# Patient Record
Sex: MALE | Race: ASIAN | Employment: UNEMPLOYED | URBAN - METROPOLITAN AREA
[De-identification: names, ages, dates, MRNs, and addresses within clinical notes are randomized per-mention and may not be internally consistent; named-entity substitution may affect disease eponyms.]

---

## 2017-11-17 ENCOUNTER — OFFICE VISIT (OUTPATIENT)
Dept: INTERNAL MEDICINE CLINIC | Facility: CLINIC | Age: 57
End: 2017-11-17

## 2017-11-17 VITALS
HEIGHT: 65 IN | TEMPERATURE: 97.3 F | DIASTOLIC BLOOD PRESSURE: 84 MMHG | WEIGHT: 175 LBS | BODY MASS INDEX: 29.16 KG/M2 | HEART RATE: 68 BPM | RESPIRATION RATE: 18 BRPM | SYSTOLIC BLOOD PRESSURE: 142 MMHG

## 2017-11-17 DIAGNOSIS — Z12.11 COLON CANCER SCREENING: ICD-10-CM

## 2017-11-17 DIAGNOSIS — G47.30 SLEEP APNEA, UNSPECIFIED TYPE: ICD-10-CM

## 2017-11-17 DIAGNOSIS — I10 ESSENTIAL HYPERTENSION: Primary | ICD-10-CM

## 2017-11-17 DIAGNOSIS — Z23 ENCOUNTER FOR IMMUNIZATION: ICD-10-CM

## 2017-11-17 DIAGNOSIS — Z90.5 HISTORY OF NEPHRECTOMY: ICD-10-CM

## 2017-11-17 DIAGNOSIS — E78.5 HYPERLIPIDEMIA LDL GOAL <100: ICD-10-CM

## 2017-11-17 DIAGNOSIS — Z86.73 HISTORY OF STROKE: ICD-10-CM

## 2017-11-17 RX ORDER — ATORVASTATIN CALCIUM 80 MG/1
80 TABLET, FILM COATED ORAL DAILY
COMMUNITY
End: 2018-12-27

## 2017-11-17 RX ORDER — CLONIDINE HYDROCHLORIDE 0.3 MG/1
0.3 TABLET ORAL 2 TIMES DAILY
COMMUNITY
End: 2018-11-29 | Stop reason: SDUPTHER

## 2017-11-17 RX ORDER — HYDRALAZINE HYDROCHLORIDE 25 MG/1
25 TABLET, FILM COATED ORAL 3 TIMES DAILY
COMMUNITY
End: 2018-06-27 | Stop reason: SDUPTHER

## 2017-11-17 RX ORDER — GUAIFENESIN 100 MG/5ML
81 LIQUID (ML) ORAL DAILY
COMMUNITY
End: 2022-10-24 | Stop reason: SDUPTHER

## 2017-11-17 RX ORDER — METOPROLOL SUCCINATE 100 MG/1
50 TABLET, EXTENDED RELEASE ORAL DAILY
COMMUNITY
End: 2019-04-04 | Stop reason: SDUPTHER

## 2017-11-17 RX ORDER — CHOLECALCIFEROL TAB 125 MCG (5000 UNIT) 125 MCG
TAB ORAL DAILY
COMMUNITY
End: 2022-10-04

## 2017-11-17 RX ORDER — HYDROCORTISONE ACETATE 1 %
CREAM (GRAM) TOPICAL DAILY
COMMUNITY
End: 2022-10-04

## 2017-11-17 NOTE — PATIENT INSTRUCTIONS
Learning About Low-Fat Eating  What is low-fat eating? Most food has some fat in it. Your body needs some fat to be healthy. But some kinds of fats are healthier than others. In a low-fat eating plan, you try to choose healthier fats and eat fewer unhealthy fats. Healthy fats include olive and canola oil. Try to avoid eating too much saturated fat (such as in cheese and meats) and trans fat (a type of fat found in many packaged snack foods and other baked goods). You do not need to cut all fat from your diet. But you can make healthier choices about the types and amount of fat you eat. Even though it is a good idea to choose healthier fats, it is still important to be careful of how much fat you eat, because all fats are high in calories. What are the different types of fats? Unhealthy fats  · Saturated fat. Saturated fats are mostly in animal foods, such as meat and dairy foods. Tropical oils, such as coconut oil, palm oil, and cocoa butter, are also saturated fats. · Trans fat. Trans fats include shortening, partially hydrogenated vegetable oils, and hydrogenated vegetable oils. Trans fats are made when a liquid fat is turned into a solid fat (for example, when corn oil is made into stick margarine). They are in many processed foods, such as cookies, crackers, and snack foods. Healthy fats  · Monounsaturated fat. Monounsaturated fats are liquid at room temperature but get solid when refrigerated. Eating foods that are high in this fat may help lower your \"bad\" (LDL) cholesterol, keep your \"good\" (HDL) cholesterol level up, and lower your chances of getting heart disease. This fat is found in canola oil, olive oil, peanut oil, olives, avocados, nuts, and nut butters. · Polyunsaturated fat. Polyunsaturated fats are liquid at room temperature. They are in safflower, sunflower, and corn oils. They are also the main fat in seafood. Omega-3 fatty acids are types of polyunsaturated fat.  Omega-3 fatty acids may lower your chances of getting heart disease. Fatty fish such as salmon and mackerel contain these healthy fatty acids. So do ground flaxseeds and flaxseed oil, soybeans, walnuts, and seeds. Why cut down on unhealthy fats? Eating foods that contain saturated fats can raise the LDL (\"bad\") cholesterol in your blood. Having a high level of LDL cholesterol increases your chance of hardening of the arteries (atherosclerosis), which can lead to heart disease, heart attack, and stroke. Trans fat raises the level of \"bad\" LDL cholesterol in your blood and may lower the \"good\" HDL cholesterol in your blood. Trans fat can raise your risk of heart disease, heart attack, and stroke. In general:  · No more than 10% of your daily calories should come from saturated fat. This is about 20 grams in a 2,000-calorie diet. · No more than 10% of your daily calories should come from polyunsaturated fat. This is about 20 grams in a 2,000-calorie diet. · Monounsaturated fats can be up to 15% of your daily calories. This is about 25 to 30 grams in a 2,000-calorie diet. If you're not sure how much fat you should be eating or how many calories you need each day to stay at a healthy weight, talk to a registered dietitian. He or she can help you create a plan that's right for you. What can you do to cut down on fat? Foods like cheese, butter, sausage, and desserts can have a lot of unhealthy fats. Try these tips for healthier meals at home and when you eat out. At home  · Fill up on fruits, vegetables, and whole grains. · Think of meat as a side dish instead of as the main part of your meal.  · When you do eat meat, make it extra-lean ground beef (97% lean), ground turkey breast (without skin added), meats with fat trimmed off before cooking, or skinless chicken. · Try main dishes that use whole wheat pasta, brown rice, dried beans, or vegetables.   · Use cooking methods that use little or no fat, such as broiling, steaming, or grilling. Use cooking spray instead of oil. If you use oil, use a monounsaturated oil, such as canola or olive oil. · Read food labels on canned, bottled, or packaged foods. Choose those with little saturated fat and no trans fat. When eating out at a restaurant  · Order foods that are broiled or poached instead of fried or breaded. · Cut back on the amount of butter or margarine that you use on bread. Use small amounts of olive oil instead. · Order sauces, gravies, and salad dressings on the side, and use only a little. · When you order pasta, choose tomato sauce instead of cream sauce. · Ask for salsa with your baked potato instead of sour cream, butter, cheese, or de guzman. Where can you learn more? Go to http://vitaly-isaac.info/. Enter Y362 in the search box to learn more about \"Learning About Low-Fat Eating. \"  Current as of: May 12, 2017  Content Version: 11.4  © 3612-7448 Healthwise, Incorporated. Care instructions adapted under license by [employer] (which disclaims liability or warranty for this information). If you have questions about a medical condition or this instruction, always ask your healthcare professional. [address] any warranty or liability for your use of this information.

## 2017-11-17 NOTE — PROGRESS NOTES
Subjective:      Petey Alarcon is a 61 y.o. male who is a new patient and is here to establish care. Previous followed by PCP in July 2017. The following sections were reviewed & updated as appropriate: PMH, PL, PSH, FH, RxH, and SH. His primary language Lo[place]. History of nephrectomy, stroke in 2007, he also has hypertension and hyperlipidemia. He is being followed by nephrology. He will see an opthalmology tomorrow, he complains of running eyes. Reviewed previous records and scanned pertinent files. Cardiovascular Review  The patient has hypertension, hyperlipidemia and stroke in 2007. He reports taking medications as instructed, no medication side effects noted, no chest pain on exertion, no dyspnea on exertion, no swelling of ankles, no orthostatic dizziness or lightheadedness, no palpitations. Diet and Lifestyle: generally follows a low fat low cholesterol diet, generally follows a low sodium diet, exercises regularly. Labs: reviewed and discussed with patient. Lsat lipid check was in 2016 per wife's files. Will request additional records from his previous PCP, repeat levels needed as well as due date for physical. He states his BP is typically 120s/70s. He states he runs on a treadmill, he has residual right sided weakness in both upper and lower extremities. Nephrectomy: done in 1990 to right. He states he will see nephrology 11/28/17, his creatinine has been steadily increasing. Patient Active Problem List    Diagnosis Date Noted    Essential hypertension 11/17/2017    History of nephrectomy 11/17/2017    Hyperlipidemia LDL goal <100 11/17/2017    History of stroke 11/17/2017    Sleep apnea 11/17/2017     Current Outpatient Prescriptions   Medication Sig Dispense Refill    vitamin e (AQUA GEMS) 200 unit capsule Take  by mouth daily.  cholecalciferol, VITAMIN D3, (VITAMIN D3) 5,000 unit tab tablet Take  by mouth daily.       metoprolol succinate (TOPROL XL) 100 mg tablet Take by mouth daily.  cloNIDine HCl (CATAPRES) 0.3 mg tablet Take 0.3 mg by mouth two (2) times a day.  hydrALAZINE (APRESOLINE) 25 mg tablet Take 25 mg by mouth three (3) times daily.  atorvastatin (LIPITOR) 80 mg tablet Take 80 mg by mouth daily.  aspirin 81 mg chewable tablet Take 81 mg by mouth daily. No Known Allergies  Past Medical History:   Diagnosis Date    Hypercholesterolemia     Hypertension     Stroke ([facility] [address]      Family History   Problem Relation Age of Onset    Heart Disease Mother     No Known Problems Father     Stroke Brother      Social History   Substance Use Topics    Smoking status: Former Smoker    Smokeless tobacco: Never Used    Alcohol use No        Review of Systems    A comprehensive review of systems was negative. Objective:     Visit Vitals    /84    Pulse 68    Temp 97.3 °F (36.3 °C) (Oral)    Resp 18    Ht 5' 5\" (1.651 m)    Wt 175 lb (79.4 kg)    BMI 29.12 kg/m2     General appearance: alert, cooperative, no distress, appears stated age  Head: Normocephalic, without obvious abnormality, atraumatic  Eyes: negative  Back: symmetric, no curvature. ROM normal. No CVA tenderness. Lungs: clear to auscultation bilaterally  Heart: regular rate and rhythm, S1, S2 normal, no murmur, click, rub or gallop  Extremities: extremities normal, atraumatic, no cyanosis or edema  Pulses: 2+ and symmetric  Neurologic: Alert and oriented X 3, normal strength and tone. Normal symmetric reflexes. Normal coordination and gait, ambulates with cane  Nursing note and vitals reviewed  Assessment/Plan:       ICD-10-CM ICD-9-CM    1. Essential hypertension I10 401.9    2. History of nephrectomy Z90.5 V45.73    3. Hyperlipidemia LDL goal <100 E78.5 272.4    4. History of stroke Z86.73 V12.54    5. Colon cancer screening Z12.11 V76.51 REFERRAL TO GASTROENTEROLOGY   6. Encounter for immunization Z23 V03.89 INFLUENZA VIRUS VAC QUAD,SPLIT,PRESV FREE SYRINGE IM   7.  Sleep apnea, unspecified type G47.30 780.57 REFERRAL TO SLEEP STUDIES     Follow-up Disposition:  Return for Schedule your annual physical with fasting labs, Please sign record release forms at . Advised him to call back or return to office if symptoms worsen/change/persist.  Discussed expected course/resolution/complications of diagnosis in detail with patient. Medication risks/benefits/costs/interactions/alternatives discussed with patient. He was given an after visit summary which includes diagnoses, current medications, & vitals. He expressed understanding with the diagnosis and plan.

## 2017-11-17 NOTE — MR AVS SNAPSHOT
Visit Information Date & Time Provider Department Dept. Phone Encounter #  
 11/17/2017  8:30 AM Vilma Baum NP [facility] [phone] [id number] Follow-up Instructions Return for Schedule your annual physical with fasting labs. Upcoming Health Maintenance Date Due Hepatitis C Screening 1960 DTaP/Tdap/Td series (1 - Tdap) 8/9/1980 FOBT Q 1 YEAR AGE 50-75 8/9/2009 Influenza Age 5 to Adult 8/1/2017 Allergies as of 11/17/2017  Review Complete On: 11/17/2017 By: Vilma Baum NP No Known Allergies Current Immunizations  Never Reviewed Name Date Influenza Vaccine (Quad) PF  Incomplete Not reviewed this visit You Were Diagnosed With   
  
 Codes Comments Essential hypertension    -  Primary ICD-10-CM: I10 
ICD-9-CM: 401.9 History of nephrectomy     ICD-10-CM: Z90.5 ICD-9-CM: V45.73 Hyperlipidemia LDL goal <100     ICD-10-CM: E78.5 ICD-9-CM: 272.4 History of stroke     ICD-10-CM: Z86.73 
ICD-9-CM: V12.54 Colon cancer screening     ICD-10-CM: Z12.11 ICD-9-CM: V76.51 Encounter for immunization     ICD-10-CM: S15 ICD-9-CM: V03.89 Sleep apnea, unspecified type     ICD-10-CM: G47.30 ICD-9-CM: 780.57 Vitals BP Pulse Temp Resp Height(growth percentile) Weight(growth percentile) (!) 150/91 68 97.3 °F (36.3 °C) (Oral) 18 5' 5\" (1.651 m) 175 lb (79.4 kg) BMI Smoking Status 29.12 kg/m2 Former Smoker Vitals History BMI and BSA Data Body Mass Index Body Surface Area  
 29.12 kg/m 2 1.91 m 2 Preferred Pharmacy Pharmacy Name Phone [facility] [address], [address] [place] [address] [phone] Your Updated Medication List  
  
   
This list is accurate as of: 11/17/17  9:19 AM.  Always use your most recent med list.  
  
  
  
  
 aspirin 81 mg chewable tablet Take 81 mg by mouth daily. atorvastatin 80 mg tablet Commonly known as:  LIPITOR Take 80 mg by mouth daily. cholecalciferol (VITAMIN D3) 5,000 unit Tab tablet Commonly known as:  VITAMIN D3 Take  by mouth daily. cloNIDine HCl 0.3 mg tablet Commonly known as:  CATAPRES Take 0.3 mg by mouth two (2) times a day. hydrALAZINE 25 mg tablet Commonly known as:  APRESOLINE Take 25 mg by mouth three (3) times daily. TOPROL  mg tablet Generic drug:  metoprolol succinate Take  by mouth daily. vitamin e 200 unit capsule Commonly known as:  [address] Take  by mouth daily. We Performed the Following INFLUENZA VIRUS VAC QUAD,SPLIT,PRESV FREE SYRINGE IM [id number] CPT(R)] REFERRAL TO GASTROENTEROLOGY [VTG06 Custom] REFERRAL TO SLEEP STUDIES [REF99 Custom] Follow-up Instructions Return for Schedule your annual physical with fasting labs. Referral Information Referral ID Referred By Referred To  
  
 [id number] SKIFF, [address] [address] [address] [place] [address] Visits Status Start Date End Date 1 New Request 11/17/17 11/17/18 If your referral has a status of pending review or denied, additional information will be sent to support the outcome of this decision. Referral ID Referred By Referred To  
 [id number] SKIFF, Shellye Crass, [address] Phone: [phone] Visits Status Start Date End Date 1 New Request 11/17/17 11/17/18 If your referral has a status of pending review or denied, additional information will be sent to support the outcome of this decision. Patient Instructions Learning About Low-Fat Eating What is low-fat eating? Most food has some fat in it. Your body needs some fat to be healthy. But some kinds of fats are healthier than others. In a low-fat eating plan, you try to choose healthier fats and eat fewer unhealthy fats. Healthy fats include olive and canola oil. Try to avoid eating too much saturated fat (such as in cheese and meats) and trans fat (a type of fat found in many packaged snack foods and other baked goods). You do not need to cut all fat from your diet. But you can make healthier choices about the types and amount of fat you eat. Even though it is a good idea to choose healthier fats, it is still important to be careful of how much fat you eat, because all fats are high in calories. What are the different types of fats? Unhealthy fats · Saturated fat. Saturated fats are mostly in animal foods, such as meat and dairy foods. Tropical oils, such as coconut oil, palm oil, and cocoa butter, are also saturated fats. · Trans fat. Trans fats include shortening, partially hydrogenated vegetable oils, and hydrogenated vegetable oils. Trans fats are made when a liquid fat is turned into a solid fat (for example, when corn oil is made into stick margarine). They are in many processed foods, such as cookies, crackers, and snack foods. Healthy fats · Monounsaturated fat. Monounsaturated fats are liquid at room temperature but get solid when refrigerated. Eating foods that are high in this fat may help lower your \"bad\" (LDL) cholesterol, keep your \"good\" (HDL) cholesterol level up, and lower your chances of getting heart disease. This fat is found in canola oil, olive oil, peanut oil, olives, avocados, nuts, and nut butters. · Polyunsaturated fat. Polyunsaturated fats are liquid at room temperature. They are in safflower, sunflower, and corn oils. They are also the main fat in seafood. Omega-3 fatty acids are types of polyunsaturated fat. Omega-3 fatty acids may lower your chances of getting heart disease. Fatty fish such as salmon and mackerel contain these healthy fatty acids.  So do ground flaxseeds and flaxseed oil, soybeans, walnuts, and seeds. Why cut down on unhealthy fats? Eating foods that contain saturated fats can raise the LDL (\"bad\") cholesterol in your blood. Having a high level of LDL cholesterol increases your chance of hardening of the arteries (atherosclerosis), which can lead to heart disease, heart attack, and stroke. Trans fat raises the level of \"bad\" LDL cholesterol in your blood and may lower the \"good\" HDL cholesterol in your blood. Trans fat can raise your risk of heart disease, heart attack, and stroke. In general: · No more than 10% of your daily calories should come from saturated fat. This is about 20 grams in a 2,000-calorie diet. · No more than 10% of your daily calories should come from polyunsaturated fat. This is about 20 grams in a 2,000-calorie diet. · Monounsaturated fats can be up to 15% of your daily calories. This is about 25 to 30 grams in a 2,000-calorie diet. If you're not sure how much fat you should be eating or how many calories you need each day to stay at a healthy weight, talk to a registered dietitian. He or she can help you create a plan that's right for you. What can you do to cut down on fat? Foods like cheese, butter, sausage, and desserts can have a lot of unhealthy fats. Try these tips for healthier meals at home and when you eat out. At home · Fill up on fruits, vegetables, and whole grains. · Think of meat as a side dish instead of as the main part of your meal. 
· When you do eat meat, make it extra-lean ground beef (97% lean), ground turkey breast (without skin added), meats with fat trimmed off before cooking, or skinless chicken. · Try main dishes that use whole wheat pasta, brown rice, dried beans, or vegetables. · Use cooking methods that use little or no fat, such as broiling, steaming, or grilling. Use cooking spray instead of oil. If you use oil, use a monounsaturated oil, such as canola or olive oil. · Read food labels on canned, bottled, or packaged foods. Choose those with little saturated fat and no trans fat. When eating out at a restaurant · Order foods that are broiled or poached instead of fried or breaded. · Cut back on the amount of butter or margarine that you use on bread. Use small amounts of olive oil instead. · Order sauces, gravies, and salad dressings on the side, and use only a little. · When you order pasta, choose tomato sauce instead of cream sauce. · Ask for salsa with your baked potato instead of sour cream, butter, cheese, or de guzman. Where can you learn more? Go to http://vitalyFleet Entertainment Groupisaac.info/. Enter Y738 in the search box to learn more about \"Learning About Low-Fat Eating. \" Current as of: May 12, 2017 Content Version: 11.4 © 7897-7806 [employer]. Care instructions adapted under license by [employer] (which disclaims liability or warranty for this information). If you have questions about a medical condition or this instruction, always ask your healthcare professional. [address] [address] any warranty or liability for your use of this information. Introducing [facility]! [employer] introduces Suite101 patient portal. Now you can access parts of your medical record, email your doctor's office, and request medication refills online. 1. In your internet browser, go to [email or web address] Textbook Time 2. Click on the First Time User? Click Here link in the Sign In box. You will see the New Member Sign Up page. 3. Enter your Suite101 Access Code exactly as it appears below. You will not need to use this code after youve completed the sign-up process. If you do not sign up before the expiration date, you must request a new code. · Suite101 Access Code: [email or web address] Expires: 2/15/2018  9:18 AM 
 
4.  Enter the last four digits of your Social Security Number (xxxx) and Date of Birth (mm/dd/yyyy) as indicated and click Submit. You will be taken to the next sign-up page. 5. Create a Telunjuk ID. This will be your [employer]uk login ID and cannot be changed, so think of one that is secure and easy to remember. 6. Create a Tel[employer]uk password. You can change your password at any time. 7. Enter your Password Reset Question and Answer. This can be used at a later time if you forget your password. 8. Enter your e-mail address. You will receive e-mail notification when new information is available in [address]. 9. Click Sign Up. You can now view and download portions of your medical record. 10. Click the Download Summary menu link to download a portable copy of your medical information. If you have questions, please visit the Frequently Asked Questions section of the Telunjuk website. Remember, Telunjuk is NOT to be used for urgent needs. For medical emergencies, dial 911. Now available from your iPhone and Android! Please provide this summary of care documentation to your next provider. If you have any questions after today's visit, please call [phone].

## 2017-11-17 NOTE — PROGRESS NOTES
Chief Complaint   Patient presents with   [facility]     1. Have you been to the ER, urgent care clinic since your last visit? Hospitalized since your last visit? No    2. Have you seen or consulted any other health care providers outside of the [address] [place] [address] since your last visit? Include any pap smears or colon screening. No    Dayna Reddy  is a 61 y.o.  male  who present for influenza immunizations/injections. He/she denies any symptoms , reactions or allergies that would exclude them from being immunized today. Risks and adverse reactions were discussed and the VIS was given if applicable to them. All questions were addressed. He/She was observed for 5 min post injection. There were no reactions observed.     Magaly Andrade LPN

## 2018-01-03 ENCOUNTER — DOCUMENTATION ONLY (OUTPATIENT)
Dept: INTERNAL MEDICINE CLINIC | Facility: CLINIC | Age: 58
End: 2018-01-03

## 2018-01-03 DIAGNOSIS — Z86.73 HISTORY OF STROKE: ICD-10-CM

## 2018-01-03 PROBLEM — N18.4 CKD (CHRONIC KIDNEY DISEASE) STAGE 4, GFR 15-29 ML/MIN (HCC): Status: ACTIVE | Noted: 2018-01-03

## 2018-01-03 NOTE — PROGRESS NOTES
Received previous medical records from [address] [place] [address] [address]. Med req done, amlodipine 10mg, clopidogrel 75mg, flonase, claritin, and proair are on his records but were not mentioned when he was last seen. Problem list updated. Td done in 2006. Stroke history in 2007.  History of positive PPD test

## 2018-03-21 PROBLEM — E66.3 OVERWEIGHT (BMI 25.0-29.9): Status: ACTIVE | Noted: 2018-03-21

## 2018-04-24 ENCOUNTER — TELEPHONE (OUTPATIENT)
Dept: INTERNAL MEDICINE CLINIC | Facility: CLINIC | Age: 58
End: 2018-04-24

## 2018-04-24 ENCOUNTER — OFFICE VISIT (OUTPATIENT)
Dept: INTERNAL MEDICINE CLINIC | Facility: CLINIC | Age: 58
End: 2018-04-24

## 2018-04-24 VITALS
TEMPERATURE: 97.8 F | HEIGHT: 65 IN | DIASTOLIC BLOOD PRESSURE: 95 MMHG | SYSTOLIC BLOOD PRESSURE: 157 MMHG | HEART RATE: 90 BPM | RESPIRATION RATE: 18 BRPM | WEIGHT: 176 LBS | BODY MASS INDEX: 29.32 KG/M2

## 2018-04-24 DIAGNOSIS — M1A.9XX1 GOUTY TOPHI OF JOINT: Primary | ICD-10-CM

## 2018-04-24 DIAGNOSIS — I10 ESSENTIAL HYPERTENSION: ICD-10-CM

## 2018-04-24 DIAGNOSIS — N18.4 CKD (CHRONIC KIDNEY DISEASE) STAGE 4, GFR 15-29 ML/MIN (HCC): ICD-10-CM

## 2018-04-24 DIAGNOSIS — M79.89 SWELLING OF TOE OF RIGHT FOOT: ICD-10-CM

## 2018-04-24 RX ORDER — PREDNISONE 10 MG/1
10 TABLET ORAL SEE ADMIN INSTRUCTIONS
Qty: 1 PACKAGE | Refills: 0 | Status: SHIPPED | OUTPATIENT
Start: 2018-04-24 | End: 2018-09-26 | Stop reason: ALTCHOICE

## 2018-04-24 NOTE — PROGRESS NOTES
Chief Complaint   Patient presents with    Joint Swelling     Right foot , left shoulder     1. Have you been to the ER, urgent care clinic since your last visit? Hospitalized since your last visit? No    2. Have you seen or consulted any other health care providers outside of the [address] [place] [address] since your last visit? Include any pap smears or colon screening.  No

## 2018-04-24 NOTE — PROGRESS NOTES
Subjective:      Mian Matthews is a 61 y.o. male who presents today for joint complaint. He will be seeing nephrologist in 2 days. Joint swelling: he notes it to right foot and left shoulder, symptoms started 3 months ago to left shoulder that resolved, right ankle with swelling for 3 days. The shoulder was painful and swollen after a fall. He states he had gout in the past bu this feels different. He denies any fall, trauma. Pain started in right toe. He is taking tylenol for pain for 8/10 foot pain. Cardiovascular Review  The patient has hypertension, history of CKD. Since last visit: pressure elevated today in office. He reports taking medications as instructed, no medication side effects noted, no TIA's, no chest pain on exertion, no dyspnea on exertion, no palpitations. Diet and Lifestyle: generally follows a low fat low cholesterol diet, generally follows a low sodium diet, exercises regularly. Labs: no lab studies available for review at time of visit. Patient Active Problem List    Diagnosis Date Noted    Overweight (BMI 25.0-29.9) 03/21/2018    CKD (chronic kidney disease) stage 4, GFR 15-29 ml/min ([facility] [address] 01/03/2018    Essential hypertension 11/17/2017    History of nephrectomy 11/17/2017    Hyperlipidemia LDL goal <100 11/17/2017    History of stroke 11/17/2017    Sleep apnea 11/17/2017     Current Outpatient Prescriptions   Medication Sig Dispense Refill    predniSONE (STERAPRED DS) 10 mg dose pack Take 1 Tab by mouth See Admin Instructions. Indications: Contact Dermatitis 1 Package 0    vitamin e (AQUA GEMS) 200 unit capsule Take  by mouth daily.  cholecalciferol, VITAMIN D3, (VITAMIN D3) 5,000 unit tab tablet Take  by mouth daily.  metoprolol succinate (TOPROL XL) 100 mg tablet Take  by mouth daily.  cloNIDine HCl (CATAPRES) 0.3 mg tablet Take 0.3 mg by mouth two (2) times a day.       hydrALAZINE (APRESOLINE) 25 mg tablet Take 25 mg by mouth three (3) times daily.  atorvastatin (LIPITOR) 80 mg tablet Take 80 mg by mouth daily.  aspirin 81 mg chewable tablet Take 81 mg by mouth daily. No Known Allergies  Past Medical History:   Diagnosis Date    Hypercholesterolemia     Hypertension     Stroke ([facility] [address]     TIA, stroke 2007        Review of Systems    A comprehensive review of systems was negative except for that written in the HPI. Objective:     Visit Vitals    BP (!) 157/95    Pulse 90    Temp 97.8 °F (36.6 °C)    Resp 18    Ht 5' 5\" (1.651 m)    Wt 176 lb (79.8 kg)    BMI 29.29 kg/m2     General appearance: alert, cooperative, no distress, appears stated age  Head: Normocephalic, without obvious abnormality, atraumatic  Eyes: negative  Back: symmetric, no curvature. ROM normal. No CVA tenderness. Lungs: clear to auscultation bilaterally  Heart: regular rate and rhythm, S1, S2 normal, no murmur, click, rub or gallop  Extremities: edema noted to right foot, right MTP joint to 1st digit with erythema, painful to palpation. Patient with slight erythema to left wrist that is not painful to palpation  Pulses: 2+ and symmetric  Skin: Skin color, texture, turgor normal. No rashes or lesions  Neurologic: Alert and oriented X 3, normal strength and tone. Normal symmetric reflexes. Normal coordination and gait  Nursing note and vitals reviewed  Assessment/Plan:       ICD-10-CM ICD-9-CM    1. Gouty tophi of joint M1A.00X1 274.03 URIC ACID      CRP, HIGH SENSITIVITY      predniSONE (STERAPRED DS) 10 mg dose pack   2. Swelling of toe of right foot M79.89 729.81 CBC WITH AUTOMATED DIFF      CRP, HIGH SENSITIVITY      predniSONE (STERAPRED DS) 10 mg dose pack   3. CKD (chronic kidney disease) stage 4, GFR 15-29 ml/min ([facility]) N18.4 585.4 REFERRAL TO NEPHROLOGY   4. Essential hypertension I10 401.9    He will see nephrology in 2 days, he will call with his BP from that visit.  Pressures uncontrolled today  Follow-up Disposition:  Return in about 4 weeks (around 5/22/2018) for Hypertension. Advised him to call back or return to office if symptoms worsen/change/persist.  Discussed expected course/resolution/complications of diagnosis in detail with patient. Medication risks/benefits/costs/interactions/alternatives discussed with patient. He was given an after visit summary which includes diagnoses, current medications, & vitals. He expressed understanding with the diagnosis and plan. Discussed the patient's BMI with him. The BMI follow up plan is as follows:     dietary management education, guidance, and counseling  encourage exercise  monitor weight  prescribed dietary intake    An After Visit Summary was printed and given to the patient.

## 2018-04-24 NOTE — PATIENT INSTRUCTIONS
Gout: Care Instructions  Your Care Instructions    Gout is a form of arthritis caused by a buildup of uric acid crystals in a joint. It causes sudden attacks of pain, swelling, redness, and stiffness, usually in one joint, especially the big toe. Gout usually comes on without a cause. But it can be brought on by drinking alcohol (especially beer) or eating seafood and red meat. Taking certain medicines, such as diuretics or aspirin, also can bring on an attack of gout. Taking your medicines as prescribed and following up with your doctor regularly can help you avoid gout attacks in the future. Follow-up care is a key part of your treatment and safety. Be sure to make and go to all appointments, and call your doctor if you are having problems. It's also a good idea to know your test results and keep a list of the medicines you take. How can you care for yourself at home? · If the joint is swollen, put ice or a cold pack on the area for 10 to 20 minutes at a time. Put a thin cloth between the ice and your skin. · Prop up the sore limb on a pillow when you ice it or anytime you sit or lie down during the next 3 days. Try to keep it above the level of your heart. This will help reduce swelling. · Rest sore joints. Avoid activities that put weight or strain on the joints for a few days. Take short rest breaks from your regular activities during the day. · Take your medicines exactly as prescribed. Call your doctor if you think you are having a problem with your medicine. · Take pain medicines exactly as directed. ¨ If the doctor gave you a prescription medicine for pain, take it as prescribed. ¨ If you are not taking a prescription pain medicine, ask your doctor if you can take an over-the-counter medicine. · Eat less seafood and red meat. · Check with your doctor before drinking alcohol. · Losing weight, if you are overweight, may help reduce attacks of gout. But do not go on a [employer]. \" Losing a lot of weight in a short amount of time can cause a gout attack. When should you call for help? Call your doctor now or seek immediate medical care if:  ? · You have a fever. ? · The joint is so painful you cannot use it. ? · You have sudden, unexplained swelling, redness, warmth, or severe pain in one or more joints. ? Watch closely for changes in your health, and be sure to contact your doctor if:  ? · You have joint pain. ? · Your symptoms get worse or are not improving after 2 or 3 days. Where can you learn more? Go to http://vitaly-isaac.info/. Enter Q188 in the search box to learn more about \"Gout: Care Instructions. \"  Current as of: October 31, 2016  Content Version: 11.4  © 8789-4326 [employer]. Care instructions adapted under license by [employer] (which disclaims liability or warranty for this information). If you have questions about a medical condition or this instruction, always ask your healthcare professional. [place] [address] any warranty or liability for your use of this information. Purine-Restricted Diet: Care Instructions  Your Care Instructions    Purines are substances that are found in some foods. Your body turns purines into uric acid. High levels of uric acid can cause gout, which is a form of arthritis that causes pain and inflammation in joints. You may be able to help control the amount of uric acid in your body by limiting high-purine foods in your diet. Follow-up care is a key part of your treatment and safety. Be sure to make and go to all appointments, and call your doctor if you are having problems. It's also a good idea to know your test results and keep a list of the medicines you take. How can you care for yourself at home? · Plan your meals and snacks around foods that are low in purines and are safe for you to eat. These foods include:  ¨ Green vegetables and tomatoes. ¨ Fruits.   ¨ Whole-grain breads, rice, and cereals. ¨ Eggs, peanut butter, and nuts. ¨ Low-fat milk, cheese, and other milk products. ¨ Popcorn. ¨ Gelatin desserts, chocolate, cocoa, and cakes and sweets, in small amounts. · You can eat certain foods that are medium-high in purines, but eat them only once in a while. These foods include:  ¨ Legumes, such as dried beans and dried peas. You can have 1 cup cooked legumes each day. ¨ Asparagus, cauliflower, spinach, mushrooms, and green peas. ¨ Fish and seafood (other than very high-purine seafood). ¨ Oatmeal, wheat bran, and wheat germ. · Limit very high-purine foods, including:  ¨ Organ meats, such as liver, kidneys, sweetbreads, and brains. ¨ Meats, including de guzman, beef, pork, and lamb. ¨ Game meats and any other meats in large amounts. ¨ Anchovies, sardines, herring, mackerel, and scallops. ¨ Gravy. ¨ Beer. Where can you learn more? Go to http://vitalyDeminosisaac.info/. Enter F448 in the search box to learn more about \"Purine-Restricted Diet: Care Instructions. \"  Current as of: May 12, 2017  Content Version: 11.4  © 8411-7134 [employer]. Care instructions adapted under license by [employer] (which disclaims liability or warranty for this information). If you have questions about a medical condition or this instruction, always ask your healthcare professional. [place] [address] any warranty or liability for your use of this information. Body Mass Index: Care Instructions  Your Care Instructions    Body mass index (BMI) can help you see if your weight is raising your risk for health problems. It uses a formula to compare how much you weigh with how tall you are. · A BMI lower than 18.5 is considered underweight. · A BMI between 18.5 and 24.9 is considered healthy. · A BMI between 25 and 29.9 is considered overweight. A BMI of 30 or higher is considered obese.   If your BMI is in the normal range, it means that you have a lower risk for weight-related health problems. If your BMI is in the overweight or obese range, you may be at increased risk for weight-related health problems, such as high blood pressure, heart disease, stroke, arthritis or joint pain, and diabetes. If your BMI is in the underweight range, you may be at increased risk for health problems such as fatigue, lower protection (immunity) against illness, muscle loss, bone loss, hair loss, and hormone problems. BMI is just one measure of your risk for weight-related health problems. You may be at higher risk for health problems if you are not active, you eat an unhealthy diet, or you drink too much alcohol or use tobacco products. Follow-up care is a key part of your treatment and safety. Be sure to make and go to all appointments, and call your doctor if you are having problems. It's also a good idea to know your test results and keep a list of the medicines you take. How can you care for yourself at home? · Practice healthy eating habits. This includes eating plenty of fruits, vegetables, whole grains, lean protein, and low-fat dairy. · If your doctor recommends it, get more exercise. Walking is a good choice. Bit by bit, increase the amount you walk every day. Try for at least 30 minutes on most days of the week. · Do not smoke. Smoking can increase your risk for health problems. If you need help quitting, talk to your doctor about stop-smoking programs and medicines. These can increase your chances of quitting for good. · Limit alcohol to 2 drinks a day for men and 1 drink a day for women. Too much alcohol can cause health problems. If you have a BMI higher than 25  · Your doctor may do other tests to check your risk for weight-related health problems.  This may include measuring the distance around your waist. A waist measurement of more than 40 inches in men or 35 inches in women can increase the risk of weight-related health problems. · Talk with your doctor about steps you can take to stay healthy or improve your health. You may need to make lifestyle changes to lose weight and stay healthy, such as changing your diet and getting regular exercise. If you have a BMI lower than 18.5  · Your doctor may do other tests to check your risk for health problems. · Talk with your doctor about steps you can take to stay healthy or improve your health. You may need to make lifestyle changes to gain or maintain weight and stay healthy, such as getting more healthy foods in your diet and doing exercises to build muscle. Where can you learn more? Go to http://vitaly-isaac.info/. Enter S176 in the search box to learn more about \"Body Mass Index: Care Instructions. \"  Current as of: October 13, 2016  Content Version: 11.4  © 3236-9795 [employer]. Care instructions adapted under license by [employer] (which disclaims liability or warranty for this information). If you have questions about a medical condition or this instruction, always ask your healthcare professional. [address] any warranty or liability for your use of this information.

## 2018-04-24 NOTE — MR AVS SNAPSHOT
[facility] 
[phone] Patient: Jean Baptiste MRN: [id number] Visit Information Date & Time Provider Department Dept. Phone Encounter #  
 4/24/2018 11:45 AM Chapo Bernard NP [facility] Internal Medicine [phone] [id number] Follow-up Instructions Return in about 4 weeks (around 5/22/2018) for Hypertension. Your Appointments 4/26/2018 11:00 AM  
New Patient with Zaira Pandya MD  
[facility] [place] [facility] Appt Note: NP refd by Chapo Bernard NP-Sleep apnea, unspecified type- already on CPAP-[employer] HMO; NP refd by Cahpo Bernard, NP-Sleep apnea, unspecified type- already on [address] [place] [id number] Upcoming Health Maintenance Date Due Hepatitis C Screening 1960 DTaP/Tdap/Td series (1 - Tdap) 8/9/1980 FOBT Q 1 YEAR AGE 50-75 8/9/2009 Allergies as of 4/24/2018  Review Complete On: 4/24/2018 By: Chapo Bernard NP No Known Allergies Current Immunizations  Reviewed on 11/17/2017 Name Date Influenza Vaccine (Quad) PF 11/17/2017 Not reviewed this visit You Were Diagnosed With   
  
 Codes Comments Gouty tophi of joint    -  Primary ICD-10-CM: M1A.00X1 ICD-9-CM: 274.03 Swelling of toe of right foot     ICD-10-CM: M79.89 ICD-9-CM: 729.81   
 CKD (chronic kidney disease) stage 4, GFR 15-29 ml/min ([facility])     ICD-10-CM: N18.4 ICD-9-CM: 284. 4 Vitals BP Pulse Temp Resp Height(growth percentile) Weight(growth percentile) (!) 157/95 90 97.8 °F (36.6 °C) 18 5' 5\" (1.651 m) 176 lb (79.8 kg) BMI Smoking Status 29.29 kg/m2 Former Smoker Vitals History BMI and BSA Data Body Mass Index Body Surface Area 29.29 kg/m 2 1.91 m 2 Preferred Pharmacy Pharmacy Name Phone [facility] [address] [phone] Your Updated Medication List  
  
   
This list is accurate as of 4/24/18 11:56 AM.  Always use your most recent med list.  
  
  
  
  
 aspirin 81 mg chewable tablet Take 81 mg by mouth daily. atorvastatin 80 mg tablet Commonly known as:  LIPITOR Take 80 mg by mouth daily. cholecalciferol (VITAMIN D3) 5,000 unit Tab tablet Commonly known as:  VITAMIN D3 Take  by mouth daily. cloNIDine HCl 0.3 mg tablet Commonly known as:  CATAPRES Take 0.3 mg by mouth two (2) times a day. hydrALAZINE 25 mg tablet Commonly known as:  APRESOLINE Take 25 mg by mouth three (3) times daily. predniSONE 10 mg dose pack Commonly known as:  STERAPRED DS Take 1 Tab by mouth See Admin Instructions. Indications: Contact Dermatitis TOPROL  mg tablet Generic drug:  metoprolol succinate Take  by mouth daily. vitamin e 200 unit capsule Commonly known as:  [address] Take  by mouth daily. Prescriptions Sent to Pharmacy Refills  
 predniSONE (STERAPRED DS) 10 mg dose pack 0 Sig: Take 1 Tab by mouth See Admin Instructions. Indications: Contact Dermatitis Class: Normal  
 Pharmacy: [facility] [address] Ph #: [phone] Route: Oral  
  
We Performed the Following CBC WITH AUTOMATED DIFF [61246 CPT(R)] CRP, HIGH SENSITIVITY [90031 CPT(R)] REFERRAL TO NEPHROLOGY [WAT15 Custom] URIC ACID [id number] CPT(R)] Follow-up Instructions Return in about 4 weeks (around 5/22/2018) for Hypertension. Referral Information Referral ID Referred By Referred To  
  
 [id number] [facility], [address] [facility] Nephrology [address] [place], [address] Visits Status Start Date End Date 1 New Request 4/24/18 4/24/19 If your referral has a status of pending review or denied, additional information will be sent to support the outcome of this decision. Patient Instructions Gout: Care Instructions Your Care Instructions Gout is a form of arthritis caused by a buildup of uric acid crystals in a joint. It causes sudden attacks of pain, swelling, redness, and stiffness, usually in one joint, especially the big toe. Gout usually comes on without a cause. But it can be brought on by drinking alcohol (especially beer) or eating seafood and red meat. Taking certain medicines, such as diuretics or aspirin, also can bring on an attack of gout. Taking your medicines as prescribed and following up with your doctor regularly can help you avoid gout attacks in the future. Follow-up care is a key part of your treatment and safety. Be sure to make and go to all appointments, and call your doctor if you are having problems. It's also a good idea to know your test results and keep a list of the medicines you take. How can you care for yourself at home? · If the joint is swollen, put ice or a cold pack on the area for 10 to 20 minutes at a time. Put a thin cloth between the ice and your skin. · Prop up the sore limb on a pillow when you ice it or anytime you sit or lie down during the next 3 days. Try to keep it above the level of your heart. This will help reduce swelling. · Rest sore joints. Avoid activities that put weight or strain on the joints for a few days. Take short rest breaks from your regular activities during the day. · Take your medicines exactly as prescribed. Call your doctor if you think you are having a problem with your medicine. · Take pain medicines exactly as directed. ¨ If the doctor gave you a prescription medicine for pain, take it as prescribed.  
¨ If you are not taking a prescription pain medicine, ask your doctor if you can take an over-the-counter medicine. · Eat less seafood and red meat. · Check with your doctor before drinking alcohol. · Losing weight, if you are overweight, may help reduce attacks of gout. But do not go on a [employer]. \" Losing a lot of weight in a short amount of time can cause a gout attack. When should you call for help? Call your doctor now or seek immediate medical care if: 
? · You have a fever. ? · The joint is so painful you cannot use it. ? · You have sudden, unexplained swelling, redness, warmth, or severe pain in one or more joints. ? Watch closely for changes in your health, and be sure to contact your doctor if: 
? · You have joint pain. ? · Your symptoms get worse or are not improving after 2 or 3 days. Where can you learn more? Go to http://vitalyWellbeatsisaac.info/. Enter Z844 in the search box to learn more about \"Gout: Care Instructions. \" Current as of: October 31, 2016 Content Version: 11.4 © 7335-0103 [employer]. Care instructions adapted under license by [employer] (which disclaims liability or warranty for this information). If you have questions about a medical condition or this instruction, always ask your healthcare professional. [address] any warranty or liability for your use of this information. Purine-Restricted Diet: Care Instructions Your Care Instructions Purines are substances that are found in some foods. Your body turns purines into uric acid. High levels of uric acid can cause gout, which is a form of arthritis that causes pain and inflammation in joints. You may be able to help control the amount of uric acid in your body by limiting high-purine foods in your diet. Follow-up care is a key part of your treatment and safety. Be sure to make and go to all appointments, and call your doctor if you are having problems.  It's also a good idea to know your test results and keep a list of the medicines you take. How can you care for yourself at home? · Plan your meals and snacks around foods that are low in purines and are safe for you to eat. These foods include: ¨ Green vegetables and tomatoes. ¨ Fruits. ¨ Whole-grain breads, rice, and cereals. ¨ Eggs, peanut butter, and nuts. ¨ Low-fat milk, cheese, and other milk products. ¨ Popcorn. ¨ Gelatin desserts, chocolate, cocoa, and cakes and sweets, in small amounts. · You can eat certain foods that are medium-high in purines, but eat them only once in a while. These foods include: ¨ Legumes, such as dried beans and dried peas. You can have 1 cup cooked legumes each day. ¨ Asparagus, cauliflower, spinach, mushrooms, and green peas. ¨ Fish and seafood (other than very high-purine seafood). ¨ Oatmeal, wheat bran, and wheat germ. · Limit very high-purine foods, including: ¨ Organ meats, such as liver, kidneys, sweetbreads, and brains. ¨ Meats, including de guzman, beef, pork, and lamb. ¨ Game meats and any other meats in large amounts. ¨ Anchovies, sardines, herring, mackerel, and scallops. ¨ Gravy. ¨ Beer. Where can you learn more? Go to http://vitaly-isaac.info/. Enter F448 in the search box to learn more about \"Purine-Restricted Diet: Care Instructions. \" Current as of: May 12, 2017 Content Version: 11.4 © 0111-2255 [employer]. Care instructions adapted under license by [employer] (which disclaims liability or warranty for this information). If you have questions about a medical condition or this instruction, always ask your healthcare professional. [address] any warranty or liability for your use of this information. Introducing [facility]! Dear Adele Velazquez: Thank you for requesting a [employer] account. Our records indicate that you already have an active [employer] account.   You can access your account anytime at [email or web address] Did you know that you can access your hospital and ER discharge instructions at any time in [employer]? You can also review all of your test results from your hospital stay or ER visit. Additional Information If you have questions, please visit the Frequently Asked Questions section of the [employer] website at [email or web address] Remember, [employer] is NOT to be used for urgent needs. For medical emergencies, dial 911. Now available from your iPhone and Android! Please provide this summary of care documentation to your next provider. If you have any questions after today's visit, please call [phone].

## 2018-04-24 NOTE — TELEPHONE ENCOUNTER
Rema Banks called on her 's behalf to get a clear understanding on how to take the prednisone medication. She's confused about the directions. Please advise!

## 2018-04-25 LAB
BASOPHILS # BLD AUTO: 0 X10E3/UL (ref 0–0.2)
BASOPHILS NFR BLD AUTO: 0 %
CRP SERPL HS-MCNC: 78.02 MG/L (ref 0–3)
EOSINOPHIL # BLD AUTO: 0.3 X10E3/UL (ref 0–0.4)
EOSINOPHIL NFR BLD AUTO: 4 %
ERYTHROCYTE [DISTWIDTH] IN BLOOD BY AUTOMATED COUNT: 14.4 % (ref 12.3–15.4)
HCT VFR BLD AUTO: 29.9 % (ref 37.5–51)
HGB BLD-MCNC: 9.8 G/DL (ref 13–17.7)
IMM GRANULOCYTES # BLD: 0 X10E3/UL (ref 0–0.1)
IMM GRANULOCYTES NFR BLD: 0 %
LYMPHOCYTES # BLD AUTO: 0.9 X10E3/UL (ref 0.7–3.1)
LYMPHOCYTES NFR BLD AUTO: 13 %
MCH RBC QN AUTO: 25.7 PG (ref 26.6–33)
MCHC RBC AUTO-ENTMCNC: 32.8 G/DL (ref 31.5–35.7)
MCV RBC AUTO: 78 FL (ref 79–97)
MONOCYTES # BLD AUTO: 0.7 X10E3/UL (ref 0.1–0.9)
MONOCYTES NFR BLD AUTO: 10 %
NEUTROPHILS # BLD AUTO: 5 X10E3/UL (ref 1.4–7)
NEUTROPHILS NFR BLD AUTO: 73 %
PLATELET # BLD AUTO: 358 X10E3/UL (ref 150–379)
RBC # BLD AUTO: 3.82 X10E6/UL (ref 4.14–5.8)
URATE SERPL-MCNC: 9.4 MG/DL (ref 3.7–8.6)
WBC # BLD AUTO: 6.9 X10E3/UL (ref 3.4–10.8)

## 2018-04-26 ENCOUNTER — OFFICE VISIT (OUTPATIENT)
Dept: SLEEP MEDICINE | Age: 58
End: 2018-04-26

## 2018-04-26 VITALS
SYSTOLIC BLOOD PRESSURE: 158 MMHG | DIASTOLIC BLOOD PRESSURE: 95 MMHG | OXYGEN SATURATION: 98 % | HEIGHT: 65 IN | HEART RATE: 80 BPM | BODY MASS INDEX: 29.82 KG/M2 | WEIGHT: 179 LBS

## 2018-04-26 DIAGNOSIS — I10 ESSENTIAL HYPERTENSION: ICD-10-CM

## 2018-04-26 DIAGNOSIS — Z86.73 HISTORY OF STROKE: ICD-10-CM

## 2018-04-26 DIAGNOSIS — G47.33 OSA (OBSTRUCTIVE SLEEP APNEA): Primary | ICD-10-CM

## 2018-04-26 LAB
BASOPHILS # BLD AUTO: 0 X10E3/UL (ref 0–0.2)
BASOPHILS NFR BLD AUTO: 1 %
EOSINOPHIL # BLD AUTO: 0.2 X10E3/UL (ref 0–0.4)
EOSINOPHIL NFR BLD AUTO: 4 %
ERYTHROCYTE [DISTWIDTH] IN BLOOD BY AUTOMATED COUNT: 13.9 % (ref 12.3–15.4)
FERRITIN SERPL-MCNC: 280 NG/ML (ref 30–400)
FOLATE SERPL-MCNC: 11 NG/ML
HCT VFR BLD AUTO: 30 % (ref 37.5–51)
HGB BLD-MCNC: 9.7 G/DL (ref 13–17.7)
IMM GRANULOCYTES # BLD: 0 X10E3/UL (ref 0–0.1)
IMM GRANULOCYTES NFR BLD: 0 %
IRON SATN MFR SERPL: 13 % (ref 15–55)
IRON SERPL-MCNC: 22 UG/DL (ref 38–169)
LYMPHOCYTES # BLD AUTO: 1.1 X10E3/UL (ref 0.7–3.1)
LYMPHOCYTES NFR BLD AUTO: 15 %
MCH RBC QN AUTO: 25.7 PG (ref 26.6–33)
MCHC RBC AUTO-ENTMCNC: 32.3 G/DL (ref 31.5–35.7)
MCV RBC AUTO: 80 FL (ref 79–97)
MONOCYTES # BLD AUTO: 0.5 X10E3/UL (ref 0.1–0.9)
MONOCYTES NFR BLD AUTO: 7 %
NEUTROPHILS # BLD AUTO: 5 X10E3/UL (ref 1.4–7)
NEUTROPHILS NFR BLD AUTO: 73 %
PLATELET # BLD AUTO: 370 X10E3/UL (ref 150–379)
RBC # BLD AUTO: 3.77 X10E6/UL (ref 4.14–5.8)
RETICS/RBC NFR AUTO: 2 % (ref 0.6–2.6)
SPECIMEN STATUS REPORT, ROLRST: NORMAL
TIBC SERPL-MCNC: 176 UG/DL (ref 250–450)
UIBC SERPL-MCNC: 154 UG/DL (ref 111–343)
VIT B12 SERPL-MCNC: 419 PG/ML (ref 232–1245)
WBC # BLD AUTO: 6.8 X10E3/UL (ref 3.4–10.8)

## 2018-04-26 NOTE — PROGRESS NOTES
[address] [place]. [place], [address]  Tel.  [phone]  Fax. 100 [facility] 60  [place], [address] [facility]  Tel.  [phone]  Fax. [phone] [address] [facility] [address]   Tel.  [phone]  Fax. [phone]         Subjective:      John Paul Manning is an 61 y.o. male referred for evaluation for a sleep disorder. He complains of snoring associated with snorting, periods of not breathing. Symptoms began several years ago, gradually worsening since that time. He usually can fall asleep in 5 minutes. Family or house members note snoring, periods of not breathing. He denies completely or partially paralyzed while falling asleep or waking up. John Paul Manning does not wake up frequently at night. He is bothered by waking up too early and left unable to get back to sleep. He actually sleeps about 7 hours at night and wakes up about 3 times during the night. He does not work shifts:  . [email or web address] [facility] indicates he does not get too little sleep at night. His bedtime is 2300. He awakens at 0600. He does take naps. He takes 3 naps a week lasting 2, Hour(s). He has the following observed behaviors:  ;  .  Other remarks:   He denies of an urge to move extremities due to discomfort or other sensation and denies of dream enactment behavior. [place] Sleepiness Score: 14 which reflect moderate daytime drowsiness. No Known Allergies      Current Outpatient Prescriptions:     predniSONE (STERAPRED DS) 10 mg dose pack, Take 1 Tab by mouth See Admin Instructions. Indications: Contact Dermatitis, Disp: 1 Package, Rfl: 0    vitamin e (AQUA GEMS) 200 unit capsule, Take  by mouth daily. , Disp: , Rfl:     cholecalciferol, VITAMIN D3, (VITAMIN D3) 5,000 unit tab tablet, Take  by mouth daily. , Disp: , Rfl:     metoprolol succinate (TOPROL XL) 100 mg tablet, Take  by mouth daily. , Disp: , Rfl:     cloNIDine HCl (CATAPRES) 0.3 mg tablet, Take 0.3 mg by mouth two (2) times a day., Disp: , Rfl:     hydrALAZINE (APRESOLINE) 25 mg tablet, Take 25 mg by mouth three (3) times daily. , Disp: , Rfl:     atorvastatin (LIPITOR) 80 mg tablet, Take 80 mg by mouth daily. , Disp: , Rfl:     aspirin 81 mg chewable tablet, Take 81 mg by mouth daily. , Disp: , Rfl:      He  has a past medical history of Hypercholesterolemia; Hypertension; and Stroke [facility]). He  has a past surgical history that includes hx nephrectomy; hx heent; and hx polypectomy. He family history includes Heart Disease in his mother; No Known Problems in his father; Stroke in his brother. He  reports that he has quit smoking. He has never used smokeless tobacco. He reports that he does not drink alcohol. Review of Systems:  Constitutional:  No significant weight loss or weight gain. Eyes:  No blurred vision. CVS:  No significant chest pain  Pulm:  No significant shortness of breath  GI:  No significant nausea or vomiting  :  significant nocturia  Musculoskeletal:  No significant joint pain at night  Skin:  No significant rashes  Neuro:  No significant dizziness   Psych:  No active mood issues    Sleep Review of Systems: notable for no difficulty falling asleep; frequent awakenings at night;  regular dreaming not reported; occasional nightmares ; no early morning headaches; no memory problems; no concentration issues; no history of any automobile or occupational accidents due to daytime drowsiness. Objective:     Visit Vitals    BP (!) 158/95    Pulse 80    Ht 5' 5\" (1.651 m)    Wt 179 lb (81.2 kg)    SpO2 98%    BMI 29.79 kg/m2         General:   Not in acute distress   Eyes:  Anicteric sclerae, no obvious strabismus   Nose:  No obvious nasal septum deviation    Oropharynx:   Class 4 oropharyngeal outlet, thick tongue base, uvula could not be seen due to low-lying soft palate, narrow tonsilo-pharyngeal pilars   Tonsils:   tonsils are not seen due to low-lying soft palate   Neck:   Neck circ. in \"inches\": 16.5; midline trachea   Chest/Lungs:  Equal lung expansion, clear on auscultation    CVS:  Normal rate, regular rhythm; no JVD   Skin:  Warm to touch; no obvious rashes   Neuro:  No focal deficits ; no obvious tremor    Psych:  Normal affect,  normal countenance;          Assessment:       ICD-10-CM ICD-9-CM    1. BRISSA (obstructive sleep apnea) G47.33 327.23 SLEEP STUDY UNATTENDED, 4 CHANNEL   2. Essential hypertension I10 401.9    3. BMI 29.0-29.9,adult Z68.29 V85.25    4. History of stroke Z86.73 V12.54          Plan:     * The patient currently has a High Risk for having sleep apnea. STOP-BANG score 7.  * Sleep testing was ordered for initial evaluation. * He was provided information on sleep apnea including coresponding risk factors and the importance of proper treatment. * Treatment options if indicated were reviewed today. Patient agrees to a trial of PAP therapy if indicated. * Counseling was provided regarding proper sleep hygiene (including effect of light on sleep), paradoxical intention, stimulus control, sleep environment safety and safe driving. * Effect of sleep disturbance on weight was reviewed. We have recommended a dedicated weight loss through appropriate diet and an exercise regiment as significant weight reduction has been shown to reduce severity of obstructive sleep apnea. * Patient agrees to telephone [phone]  follow-up by myself or lead sleep technologist shortly after sleep study to review results and plan final management.     (patient has given permission for a message to be left regarding test results and further management if patient cannot be cannot be reached directly). Thank you for allowing us to participate in your patient's medical care. We'll keep you updated on these investigations. Nba Parnell MD, FAASM  Electronically signed.  04/26/18

## 2018-04-26 NOTE — PATIENT INSTRUCTIONS
[address] [facility] [place]. [place], [address]  Tel.  [phone]  Fax. 100 [facility] 60  [place], [address] [facility]  Tel.  [phone]  Fax. [phone] [address] [place] [address] [address] Venegas  Tel.  [phone]  Fax. [phone]     Sleep Apnea: After Your Visit  Your Care Instructions  Sleep apnea occurs when you frequently stop breathing for 10 seconds or longer during sleep. It can be mild to severe, based on the number of times per hour that you stop breathing or have slowed breathing. Blocked or narrowed airways in your nose, mouth, or throat can cause sleep apnea. Your airway can become blocked when your throat muscles and tongue relax during sleep. Sleep apnea is common, occurring in 1 out of 20 individuals. Individuals having any of the following characteristics should be evaluated and treated right away due to high risk and detrimental consequences from untreated sleep apnea:  1. Obesity  2. Congestive Heart failure  3. Atrial Fibrillation  4. Uncontrolled Hypertension  5. Type II Diabetes  6. Night-time Arrhythmias  7. Stroke  8. Pulmonary Hypertension  9. High-risk Driving Populations (pilots, truck drivers, etc.)  10. Patients Considering Weight-loss Surgery    How do you know you have sleep apnea? You probably have sleep apnea if you answer 'yes' to 3 or more of the following questions:  S - Have you been told that you Snore? T - Are you often Tired during the day? O - Has anyone Observed you stop breathing while sleeping? P- Do you have (or are being treated for) high blood Pressure? B - Are you obese (Body Mass Index > 35)? A - Is your Age 48years old or older? N - Is your Neck size greater than 16 inches? G - Are you male Gender? A sleep physician can prescribe a breathing device that prevents tissues in the throat from blocking your airway.  Or your doctor may recommend using a dental device (oral breathing device) to help keep your airway open. In some cases, surgery may be needed to remove enlarged tissues in the throat. Follow-up care is a key part of your treatment and safety. Be sure to make and go to all appointments, and call your doctor if you are having problems. It's also a good idea to know your test results and keep a list of the medicines you take. How can you care for yourself at home? · Lose weight, if needed. It may reduce the number of times you stop breathing or have slowed breathing. · Go to bed at the same time every night. · Sleep on your side. It may stop mild apnea. If you tend to roll onto your back, sew a pocket in the back of your pajama top. Put a tennis ball into the pocket, and stitch the pocket shut. This will help keep you from sleeping on your back. · Avoid alcohol and medicines such as sleeping pills and sedatives before bed. · Do not smoke. Smoking can make sleep apnea worse. If you need help quitting, talk to your doctor about stop-smoking programs and medicines. These can increase your chances of quitting for good. · Prop up the head of your bed 4 to 6 inches by putting bricks under the legs of the bed. · Treat breathing problems, such as a stuffy nose, caused by a cold or allergies. · Use a continuous positive airway pressure (CPAP) breathing machine if lifestyle changes do not help your apnea and your doctor recommends it. The machine keeps your airway from closing when you sleep. · If CPAP does not help you, ask your doctor whether you should try other breathing machines. A bilevel positive airway pressure machine has two types of air pressureâone for breathing in and one for breathing out. Another device raises or lowers air pressure as needed while you breathe. · If your nose feels dry or bleeds when using one of these machines, talk with your doctor about increasing moisture in the air. A humidifier may help.   · If your nose is runny or stuffy from using a breathing machine, talk with your doctor about using decongestants or a corticosteroid nasal spray. When should you call for help? Watch closely for changes in your health, and be sure to contact your doctor if:  · You still have sleep apnea even though you have made lifestyle changes. · You are thinking of trying a device such as CPAP. · You are having problems using a CPAP or similar machine. Where can you learn more? Go to [employer]. Enter M258 in the search box to learn more about \"Sleep Apnea: After Your Visit. \"   © 4084-7264 Healthwise, Incorporated. Care instructions adapted under license by [employer] (which disclaims liability or warranty for this information). This care instruction is for use with your licensed healthcare professional. If you have questions about a medical condition or this instruction, always ask your healthcare professional. Satya Fatima any warranty or liability for your use of this information. PROPER SLEEP HYGIENE    What to avoid  · Do not have drinks with caffeine, such as coffee or black tea, for 8 hours before bed. · Do not smoke or use other types of tobacco near bedtime. Nicotine is a stimulant and can keep you awake. · Avoid drinking alcohol late in the evening, because it can cause you to wake in the middle of the night. · Do not eat a big meal close to bedtime. If you are hungry, eat a light snack. · Do not drink a lot of water close to bedtime, because the need to urinate may wake you up during the night. · Do not read or watch TV in bed. Use the bed only for sleeping and sexual activity. What to try  · Go to bed at the same time every night, and wake up at the same time every morning. Do not take naps during the day. · Keep your bedroom quiet, dark, and cool. · Get regular exercise, but not within 3 to 4 hours of your bedtime. .  · Sleep on a comfortable pillow and mattress.   · If watching the clock makes you anxious, turn it facing away from you so you cannot see the time. · If you worry when you lie down, start a worry book. Well before bedtime, write down your worries, and then set the book and your concerns aside. · Try meditation or other relaxation techniques before you go to bed. · If you cannot fall asleep, get up and go to another room until you feel sleepy. Do something relaxing. Repeat your bedtime routine before you go to bed again. · Make your house quiet and calm about an hour before bedtime. Turn down the lights, turn off the TV, log off the computer, and turn down the volume on music. This can help you relax after a busy day. Drowsy Driving  The [address] [place] [address] Road Traffic Safety Administration cites drowsiness as a causing factor in more than 097,208 police reported crashes annually, resulting in 76,000 injuries and 1,500 deaths. Other surveys suggest 55% of people polled have driven while drowsy in the past year, 23% had fallen asleep but not crashed, 3% crashed, and 2% had and accident due to drowsy driving. Who is at risk? Young Drivers: One study of drowsy driving accidents states that 55% of the drivers were under 25 years. Of those, 75% were male. Shift Workers and Travelers: People who work overnight or travel across time zones frequently are at higher risk of experiencing Circadian Rhythm Disorders. They are trying to work and function when their body is programed to sleep. Sleep Deprived: Lack of sleep has a serious impact on your ability to pay attention or focus on a task. Consistently getting less than the average of 8 hours your body needs creates partial or cumulative sleep deprivation. Untreated Sleep Disorders: Sleep Apnea, Narcolepsy, R.L.S., and other sleep disorders (untreated) prevent a person from getting enough restful sleep. This leads to excessive daytime sleepiness and increases the risk for drowsy driving accidents by up to 7 times.   Medications / Alcohol: Even over the counter medications can cause drowsiness. Medications that impair a drivers attention should have a warning label. Alcohol naturally makes you sleepy and on its own can cause accidents. Combined with excessive drowsiness its effects are amplified. Signs of Drowsy Driving:   * You don't remember driving the last few miles   * You may drift out of your low   * You are unable to focus and your thoughts wander   * You may yawn more often than normal   * You have difficulty keeping your eyes open / nodding off   * Missing traffic signs, speeding, or tailgating  Prevention-   Good sleep hygiene, lifestyle and behavioral choices have the most impact on drowsy driving. There is no substitute for sleep and the average person requires 8 hours nightly. If you find yourself driving drowsy, stop and sleep. Consider the sleep hygiene tips provided during your visit as well. Medication Refill Policy: Refills for all medications require 1 week advance notice. Please have your pharmacy fax a refill request. We are unable to fax, or call in \"controled substance\" medications and you will need to pick these prescriptions up from our office. [employer] Activation    Thank you for requesting access to [employer]. Please follow the instructions below to securely access and download your online medical record. [employer] allows you to send messages to your doctor, view your test results, renew your prescriptions, schedule appointments, and more. How Do I Sign Up? 1. In your internet browser, go to [email or web address]. 2. Click on the First Time User? Click Here link in the Sign In box. You will see the New Member Sign Up page. 3. Enter your [employer] Access Code exactly as it appears below. You will not need to use this code after youve completed the sign-up process. If you do not sign up before the expiration date, you must request a new code. [employer] Access Code:  Activation code not generated  Current [employer] Status: Active (This is the date your [employer] access code will )    4. Enter the last four digits of your Social Security Number (xxxx) and Date of Birth (mm/dd/yyyy) as indicated and click Submit. You will be taken to the next sign-up page. 5. Create a [employer] ID. This will be your [employer] login ID and cannot be changed, so think of one that is secure and easy to remember. 6. Create a [employer] password. You can change your password at any time. 7. Enter your Password Reset Question and Answer. This can be used at a later time if you forget your password. 8. Enter your e-mail address. You will receive e-mail notification when new information is available in [address] 9. Click Sign Up. You can now view and download portions of your medical record. 10. Click the Download Summary menu link to download a portable copy of your medical information. Additional Information    If you have questions, please call [phone]. Remember, [employer] is NOT to be used for urgent needs. For medical emergencies, dial 911.

## 2018-04-26 NOTE — PROGRESS NOTES
[address] [facility] [place]. [place], [address]  Tel.  [phone]  Fax. 100 [facility] 60  [place], [address] [facility]  Tel.  [phone]  Fax. [phone] [address] [facility] [place] [address]  Tel.  [phone]  Fax. [phone]       S>Helen Banks is a 61 y.o. male seen today to receive a home sleep testing unit (HST). · Patient was educated on proper hookup and operation of the HST. · The patient demonstrated good understanding of the HST. O>    Visit Vitals    BP (!) 158/95    Pulse 80    Ht 5' 5\" (1.651 m)    Wt 179 lb (81.2 kg)    SpO2 98%    BMI 29.79 kg/m2    Neck circ. in \"inches\": 16.5    A>  1. BRISSA (obstructive sleep apnea)    2. Essential hypertension    3. BMI 29.0-29.9,adult    4. History of stroke          P>  · General information regarding operations and maintenance of the device was provided. · He was provided information on sleep apnea including coresponding risk factors and the importance of proper treatment. · Follow-up appointment was made to return the HST. He will be contacted once the results have been reviewed. · He was asked to contact our office for any problems regarding his home sleep test study. Patient is scheduled to  HSAT 4/30/18. No Education needed.

## 2018-04-27 DIAGNOSIS — M10.9 ACUTE GOUT, UNSPECIFIED CAUSE, UNSPECIFIED SITE: ICD-10-CM

## 2018-04-27 DIAGNOSIS — D50.9 IRON DEFICIENCY ANEMIA, UNSPECIFIED IRON DEFICIENCY ANEMIA TYPE: Primary | ICD-10-CM

## 2018-04-27 RX ORDER — LANOLIN ALCOHOL/MO/W.PET/CERES
325 CREAM (GRAM) TOPICAL
Qty: 90 TAB | Refills: 0 | Status: SHIPPED | OUTPATIENT
Start: 2018-04-27 | End: 2019-03-29 | Stop reason: SDUPTHER

## 2018-04-30 ENCOUNTER — HOSPITAL ENCOUNTER (OUTPATIENT)
Dept: SLEEP MEDICINE | Age: 58
Discharge: HOME | End: 2018-04-30
Payer: COMMERCIAL

## 2018-04-30 PROCEDURE — 95806 SLEEP STUDY UNATT&RESP EFFT: CPT

## 2018-05-02 ENCOUNTER — DOCUMENTATION ONLY (OUTPATIENT)
Dept: SLEEP MEDICINE | Age: 58
End: 2018-05-02

## 2018-05-02 ENCOUNTER — TELEPHONE (OUTPATIENT)
Dept: SLEEP MEDICINE | Age: 58
End: 2018-05-02

## 2018-05-02 DIAGNOSIS — G47.33 OSA (OBSTRUCTIVE SLEEP APNEA): Primary | ICD-10-CM

## 2018-05-02 NOTE — TELEPHONE ENCOUNTER
Results of Sleep Testing, PAP prescription and follow-up discussed with patient. Patient encouraged to call if there were any further questions regarding sleep symptoms. Encounter Diagnosis   Name Primary?  BRISSA (obstructive sleep apnea) Yes       Orders Placed This Encounter    AMB SUPPLY ORDER     Diagnosis: Obstructive Sleep Apnea ICD-10 Code (G47.33)    Positive Airway Pressure Therapy: Duration of need: 99 months. ResMed APAP Device: Minimum Pressure: 4 cmH2O, Maximum Pressure: 20 cmH2O. Ramp Time: 30 Minutes. EPR: 2. CPAP mask -  Patient preference, headgear, tubing, and filter;  heated humidifier; wireless modem. Remote monitoring enrollment. Jacoby Barajas MD, FAASM; NPI: [id number]  Electronically signed. 05/02/18

## 2018-05-02 NOTE — TELEPHONE ENCOUNTER
HSAT Returned - [facility]    Date of Study: 4/30/18    Patient did not complete study log

## 2018-06-27 DIAGNOSIS — I10 ESSENTIAL HYPERTENSION: Primary | ICD-10-CM

## 2018-06-27 DIAGNOSIS — I10 UNCONTROLLED HYPERTENSION: ICD-10-CM

## 2018-06-27 RX ORDER — HYDRALAZINE HYDROCHLORIDE 25 MG/1
75 TABLET, FILM COATED ORAL 3 TIMES DAILY
Qty: 270 TAB | Refills: 2 | Status: SHIPPED | OUTPATIENT
Start: 2018-06-27 | End: 2018-09-27 | Stop reason: SDUPTHER

## 2018-09-26 ENCOUNTER — OFFICE VISIT (OUTPATIENT)
Dept: INTERNAL MEDICINE CLINIC | Facility: CLINIC | Age: 58
End: 2018-09-26

## 2018-09-26 VITALS
BODY MASS INDEX: 28.99 KG/M2 | HEART RATE: 70 BPM | SYSTOLIC BLOOD PRESSURE: 124 MMHG | DIASTOLIC BLOOD PRESSURE: 66 MMHG | WEIGHT: 174 LBS | RESPIRATION RATE: 18 BRPM | HEIGHT: 65 IN | TEMPERATURE: 97.8 F

## 2018-09-26 DIAGNOSIS — R07.9 INTERMITTENT CHEST PAIN: Primary | ICD-10-CM

## 2018-09-26 DIAGNOSIS — E66.3 OVERWEIGHT (BMI 25.0-29.9): ICD-10-CM

## 2018-09-26 DIAGNOSIS — I10 ESSENTIAL HYPERTENSION: ICD-10-CM

## 2018-09-26 DIAGNOSIS — N18.6 ESRF (END STAGE RENAL FAILURE) (HCC): ICD-10-CM

## 2018-09-26 DIAGNOSIS — D50.9 IRON DEFICIENCY ANEMIA, UNSPECIFIED IRON DEFICIENCY ANEMIA TYPE: ICD-10-CM

## 2018-09-26 DIAGNOSIS — E78.5 HYPERLIPIDEMIA LDL GOAL <100: ICD-10-CM

## 2018-09-26 PROBLEM — N18.4 CKD (CHRONIC KIDNEY DISEASE) STAGE 4, GFR 15-29 ML/MIN (HCC): Status: RESOLVED | Noted: 2018-01-03 | Resolved: 2018-09-26

## 2018-09-26 RX ORDER — AMLODIPINE BESYLATE 10 MG/1
TABLET ORAL
Refills: 6 | COMMUNITY
Start: 2018-09-12 | End: 2018-10-30 | Stop reason: ALTCHOICE

## 2018-09-26 RX ORDER — CLOPIDOGREL BISULFATE 75 MG/1
TABLET ORAL
COMMUNITY
End: 2019-01-18 | Stop reason: SDUPTHER

## 2018-09-26 RX ORDER — SEVELAMER CARBONATE 800 MG/1
TABLET, FILM COATED ORAL
Refills: 3 | COMMUNITY
Start: 2018-09-19 | End: 2018-12-27

## 2018-09-26 NOTE — PROGRESS NOTES
Subjective:      Scotty Hammans is a 60 y.o. male who presents today for chronic care follow up. Cardiovascular Review  The patient has hypertension and hyperlipidemia. Since last visit: he was seen a CJW for elevated BP. This was on 9/6/18. He was also having new chest pain that has been occurring nearly daily. He describes right sided chest pain where he will feel \"cold\" and have difficulty moving his right leg. The EKG was normal per wife in the ED. No EKG on file with [facility] and there is no copy in the ED patient discharge. At that time his creatinine was extremely elevated and he was diagnosed with ESRF. He has not previously been evaluated by cardiology despite referral in June. BP well controlled today. Diet and Lifestyle: generally follows a low fat low cholesterol diet, generally follows a low sodium diet, no formal exercise but active during the day. Labs: reviewed [address] [place] [address] labs with patient and wife, reviewed and discussed with patient. BP Readings from Last 3 Encounters:   09/26/18 124/66   04/26/18 (!) 158/95   04/24/18 (!) 157/95     CKD: he has progresssed to end stage kidney disease. He is getting set up for dialysis and has been asked to see Dr. Osito Angeles for shunt placement. Hematology Review  Patient with iron deficiency anemia. Since last visit: he has been taking iron supplements and has had cbc done at [address] [place] [address], nephrology also monitoring anemia. Hgb has increased since last check to 10.5. He states he feels well. Body mass index is 28.96 kg/(m^2).   Wt Readings from Last 3 Encounters:   09/26/18 174 lb (78.9 kg)   04/26/18 179 lb (81.2 kg)   04/24/18 176 lb (79.8 kg)     Patient Active Problem List    Diagnosis Date Noted    ESRF (end stage renal failure) ([facility] [address] 09/26/2018    Iron deficiency anemia 04/27/2018    Acute gout 04/27/2018    Overweight (BMI 25.0-29.9) 03/21/2018    Essential hypertension 11/17/2017    History of nephrectomy 11/17/2017    Hyperlipidemia LDL goal <100 11/17/2017    History of stroke 11/17/2017    Sleep apnea 11/17/2017     Current Outpatient Prescriptions   Medication Sig Dispense Refill    ubidecarenone (CO Q-10 PO) Take  by mouth.  amLODIPine (NORVASC) 10 mg tablet TK 1 T PO QD  6    sevelamer carbonate (RENVELA) 800 mg tab tab TK 1 T PO TID WITH MEALS  3    hydrALAZINE (APRESOLINE) 25 mg tablet Take 3 Tabs by mouth three (3) times daily. 270 Tab 2    ferrous sulfate 325 mg (65 mg iron) tablet Take 1 Tab by mouth Daily (before breakfast). 90 Tab 0    vitamin e (AQUA GEMS) 200 unit capsule Take  by mouth daily.  cholecalciferol, VITAMIN D3, (VITAMIN D3) 5,000 unit tab tablet Take  by mouth daily.  metoprolol succinate (TOPROL XL) 100 mg tablet Take  by mouth daily.  cloNIDine HCl (CATAPRES) 0.3 mg tablet Take 0.3 mg by mouth two (2) times a day.  atorvastatin (LIPITOR) 80 mg tablet Take 80 mg by mouth daily.  aspirin 81 mg chewable tablet Take 81 mg by mouth daily.  clopidogrel (PLAVIX) 75 mg tab TAKE 1 TABLET BY MOUTH EVERY DAY       No Known Allergies  Past Medical History:   Diagnosis Date    Hypercholesterolemia     Hypertension     Stroke ([facility] [address]     TIA, stroke 2007     Past Surgical History:   Procedure Laterality Date    HX HEENT      tonsillectomy in 2002    HX NEPHRECTOMY      HX POLYPECTOMY      2009        Review of Systems    A comprehensive review of systems was negative except for: Cardiovascular: positive for chest pain     Objective:     Visit Vitals    /66    Pulse 70    Temp 97.8 °F (36.6 °C) (Oral)    Resp 18    Ht 5' 5\" (1.651 m)    Wt 174 lb (78.9 kg)    BMI 28.96 kg/m2     General appearance: alert, cooperative, no distress, appears stated age  Head: Normocephalic, without obvious abnormality, atraumatic  Eyes: negative  Neck: supple, symmetrical, trachea midline, no adenopathy, no carotid bruit and no JVD  Back: symmetric, no curvature.  ROM normal. No CVA tenderness. Lungs: clear to auscultation bilaterally  Chest wall: no tenderness  Heart: regular rate and rhythm, S1, S2 normal, no murmur, click, rub or gallop  Extremities: extremities normal, atraumatic, no cyanosis. Bilateral pitting edema noted 1+  Pulses: 2+ and symmetric  Skin: Skin color, texture, turgor normal. No rashes or lesions  Neurologic: Alert and oriented X 3, normal strength and tone. Normal symmetric reflexes. Normal coordination and gait  Nursing note and vitals reviewed  Assessment/Plan:       ICD-10-CM ICD-9-CM    1. Intermittent chest pain R07.9 786.50 AMB POC EKG ROUTINE W/ 12 LEADS, INTER & REP   2. Iron deficiency anemia, unspecified iron deficiency anemia type D50.9 280.9 ANEMIA PROFILE B   3. Essential hypertension I10 401.9 LIPID PANEL      REFERRAL TO DIETITIAN   4. Hyperlipidemia LDL goal <100 E78.5 272.4 LIPID PANEL      REFERRAL TO DIETITIAN   5. Overweight (BMI 25.0-29. 9) E66.3 278.02    6. ESRF (end stage renal failure) (HCC) N18.6 585.6 REFERRAL TO VASCULAR SURGERY   EKG shows normal sinus rhythm, referral to cardiology for stress testing and echo. Follow-up Disposition:  Return in about 3 months (around 12/26/2018) for Call to schedule an appointment with cardiology today. Advised him to call back or return to office if symptoms worsen/change/persist.  Discussed expected course/resolution/complications of diagnosis in detail with patient. Medication risks/benefits/costs/interactions/alternatives discussed with patient. He was given an after visit summary which includes diagnoses, current medications, & vitals. He expressed understanding with the diagnosis and plan.

## 2018-09-26 NOTE — MR AVS SNAPSHOT
[address] [facility] 
[phone] Patient: Kacey Carlin MRN: [id number] ETY:[birth year] Visit Information Date & Time Provider Department Dept. Phone Encounter #  
 9/26/2018  9:00 AM Fredy Poe NP [facility] Internal Medicine [phone] [id number] Follow-up Instructions Return in about 3 months (around 12/26/2018) for Call to schedule an appointment with cardiology today. Your Appointments 10/1/2018  3:20 PM  
New Patient with Bulmaro Sinha MD  
[facility] Dr. Stan Smith [facility] ([address] [facility]) Appt Note: consult for avf   ref by dr Dayne Severance, [address] [facility], [address] [phone]  
[address], [address] [facility], [address] [place] [address] [address] [phone] Upcoming Health Maintenance Date Due Hepatitis C Screening 1960 DTaP/Tdap/Td series (1 - Tdap) 8/9/1980 Shingrix Vaccine Age 50> (1 of 2) 8/9/2009 FOBT Q 1 YEAR AGE 50-75 8/9/2009 Allergies as of 9/26/2018  Review Complete On: 9/26/2018 By: Fredy Poe NP No Known Allergies Current Immunizations  Reviewed on 11/17/2017 Name Date Influenza Vaccine 9/18/2018 Influenza Vaccine (Quad) PF 11/17/2017 Influenza Vaccine PF 10/18/2016 12:00 AM  
 Pneumococcal Conjugate (PCV-13) 3/22/2006 12:00 AM  
 Tetanus Toxoid, Adsorbed 3/22/2006 12:00 AM  
  
 Not reviewed this visit You Were Diagnosed With   
  
 Codes Comments Intermittent chest pain    -  Primary ICD-10-CM: R07.9 ICD-9-CM: 786.50 Iron deficiency anemia, unspecified iron deficiency anemia type     ICD-10-CM: D50.9 ICD-9-CM: 280.9 Essential hypertension     ICD-10-CM: I10 
ICD-9-CM: 401.9 Hyperlipidemia LDL goal <100     ICD-10-CM: E78.5 ICD-9-CM: 272.4 Overweight (BMI 25.0-29. 9)     ICD-10-CM: A54.6 ICD-9-CM: 278.02   
 ESRF (end stage renal failure) ([facility])     ICD-10-CM: N18.6 ICD-9-CM: 820. 6 Vitals BP Pulse Temp Resp Height(growth percentile) Weight(growth percentile) 124/66 70 97.8 °F (36.6 °C) (Oral) 18 5' 5\" (1.651 m) 174 lb (78.9 kg) BMI Smoking Status 28.96 kg/m2 Former Smoker Vitals History BMI and BSA Data Body Mass Index Body Surface Area  
 28.96 kg/m 2 1.9 m 2 Preferred Pharmacy Pharmacy Name Phone [id number], [address] [place] [address] [phone] Your Updated Medication List  
  
   
This list is accurate as of 9/26/18  9:52 AM.  Always use your most recent med list. amLODIPine 10 mg tablet Commonly known as:  [place] TK 1 T PO QD  
  
 aspirin 81 mg chewable tablet Take 81 mg by mouth daily. atorvastatin 80 mg tablet Commonly known as:  LIPITOR Take 80 mg by mouth daily. cholecalciferol (VITAMIN D3) 5,000 unit Tab tablet Commonly known as:  VITAMIN D3 Take  by mouth daily. cloNIDine HCl 0.3 mg tablet Commonly known as:  CATAPRES Take 0.3 mg by mouth two (2) times a day. clopidogrel 75 mg Tab Commonly known as:  PLAVIX TAKE 1 TABLET BY MOUTH EVERY DAY  
  
 CO Q-10 PO Take  by mouth. ferrous sulfate 325 mg (65 mg iron) tablet Take 1 Tab by mouth Daily (before breakfast). hydrALAZINE 25 mg tablet Commonly known as:  APRESOLINE Take 3 Tabs by mouth three (3) times daily. sevelamer carbonate 800 mg Tab tab Commonly known as:  Jasmeet Bhatia TK 1 T PO TID WITH MEALS  
  
 TOPROL  mg tablet Generic drug:  metoprolol succinate Take  by mouth daily. vitamin e 200 unit capsule Commonly known as:  [address] Take  by mouth daily. We Performed the Following AMB POC EKG ROUTINE W/ 12 LEADS, INTER & REP [24601 CPT(R)] ANEMIA PROFILE B [id number] CPT(R)] LIPID PANEL [95181 CPT(R)] REFERRAL TO DIETITIAN [FMV49 Custom] Comments:  
 [employer] [facility] [employer]/ 
[phone] 
 
or [address] [place] [address] MadillMelissa 
[email or web address] Mariya Latus 
[phone] 
 
or Celina Ortiz RN, RD, CDE 
[address] 
[phone] Or Maddie Dunn 
www. [email or web address] 
[phone] REFERRAL TO VASCULAR SURGERY [KZP245 Custom] Follow-up Instructions Return in about 3 months (around 12/26/2018) for Call to schedule an appointment with cardiology today. Referral Information Referral ID Referred By Referred To  
  
 [id number] SKIFF, Teresita Phenix, [employer] [address] Visits Status Start Date End Date 1 New Request 9/26/18 9/26/19 If your referral has a status of pending review or denied, additional information will be sent to support the outcome of this decision. Referral ID Referred By Referred To  
 [id number] SKIFF, Ann Camps, MD  
   [address] [place] [address] Phone: [phone] Fax: [phone] Visits Status Start Date End Date 1 New Request 9/26/18 9/26/19 If your referral has a status of pending review or denied, additional information will be sent to support the outcome of this decision. Patient Instructions Diabetic Renal Diet: Care Instructions Your Care Instructions You may already be spreading carbohydrate throughout your daily meals. When you also have kidney disease, you need to avoid foods that make your kidneys worse. Keep your blood sugar and blood pressure as near normal as you can to reduce your chance of kidney failure. Your doctor and dietitian will help you make an eating plan.  It will be based on your body weight, size, and medical condition. You may need to limit salt, fluids, and protein. You also may need to limit minerals such as potassium and phosphorus. It takes planning, but there are plenty of tasty, healthy foods you can eat. Always talk with your doctor or dietitian before you make changes in your diet. Follow-up care is a key part of your treatment and safety. Be sure to make and go to all appointments, and call your doctor if you are having problems. It's also a good idea to know your test results and keep a list of the medicines you take. How can you care for yourself at home? · Work with your doctor or dietitian to create a food plan that guides your daily food choices. · Eat regular meals. Do not skip meals or go for many hours without eating. To help control your blood sugar, try to eat several small meals during the day, rather than three large ones. · You can use margarine, mayonnaise, and oil to add calories to your diet for energy. The healthiest oils are olive, canola, and safflower oils. · Talk to your dietitian about eating sweets, including honey and sugar. · Be safe with medicines. Take your medicines exactly as prescribed. Call your doctor if you think you are having a problem with your medicine. You will get more details on the specific medicines your doctor prescribes. · Do not take any other medicine without talking to your doctor first. This includes over-the-counter medicines, vitamins, and herbal products. · Limit alcohol to no more than 1 drink per day. Count it as part of your fluid allowance. To get the right amount of protein · Ask your doctor or dietitian how much protein you can have each day. You need some protein to stay healthy. · Include all sources of protein in your daily protein count. Besides meat, poultry, and fish, protein is found in milk and milk products, beans, peas, lentils, nuts, seeds, tofu, and eggs.  Check for protein on the nutrition facts label found on packages of food such as bread and cereal. 
To limit salt · Do not add salt to your food. Avoid foods that list salt, sodium, or MSG as an ingredient. And look for \"reduced salt\" or \"low sodium\" on labels. · Do not use a salt substitute or lite salt unless your doctor says it is okay. (These products are high in potassium.) · Avoid or use very small amounts of condiments and marinades. These include soy sauce, fish sauce, and barbecue sauce. They are high in sodium. · Avoid salted pretzels, chips, and other salted snacks. · Check food labels to become more aware of the sodium content of foods. Foods that are high in sodium include soups; many canned foods; cured, smoked, or dried meats; and many packaged foods. To control carbohydrate · Spread carbohydrate throughout the day. This helps to prevent high blood sugar after meals. Ask your dietitian how much carbohydrate you can have. Carbohydrate foods include: ¨ Whole-grain and refined breads and cereals, and some vegetables such as peas and beans. ¨ Fruits, milk, and milk products (except cheese). ¨ Candy, table sugar, and regular carbonated drinks. To limit fluids · Know what your fluid allowance is. Fill a pitcher with that amount of water every day. If you drink another fluid (such as coffee) that day, pour an equal amount out of the pitcher. · Foods that are liquid at room temperature count as fluids. These include ice cream and gelatin desserts such as Jell-O. To limit potassium · Fruits that are low in potassium include blueberries and raspberries. · Vegetables that are low in potassium include cucumber and radishes. To limit phosphorus · Follow your doctor's or dietitian's plan for your limit on milk and milk products in your diet. · Avoid nuts, peanut butter, seeds, lentils, beans, organ meats, and sardines. · Avoid cola drinks. · Avoid bran breads or bran cereals. They are high in phosphorus. Where can you learn more? Go to http://vitaly-isaac.info/. Enter C305 in the search box to learn more about \"Diabetic Renal Diet: Care Instructions. \" Current as of: December 7, 2017 Content Version: 11.7 © 2790-9450 [employer]. Care instructions adapted under license by [employer] (which disclaims liability or warranty for this information). If you have questions about a medical condition or this instruction, always ask your healthcare professional. [address] any warranty or liability for your use of this information. Hemodialysis Vascular Access: Care Instructions Your Care Instructions Hemodialysis, or dialysis, is the use of a machine to remove wastes from your blood. You need it if your kidneys are not able to remove wastes on their own. A dialysis access is the place in your arm, or sometimes in your leg, where a doctor creates a blood vessel that carries a large flow of blood. When you have dialysis, two needles are placed in this blood vessel and are connected to the dialysis machine. Your blood flows out of one needle and into the machine to be cleaned. Then your cleaned blood flows back into your body through the other needle. Sometimes, a doctor makes a short-term access through a tube, called a catheter, placed in your neck, upper chest, or groin. Your doctor creates an access during a minor surgery. You need to take care of your access to keep it working and to prevent infection. Follow-up care is a key part of your treatment and safety. Be sure to make and go to all appointments, and call your doctor if you are having problems. It's also a good idea to know your test results and keep a list of the medicines you take. How can you care for yourself at home? · After your doctor creates an access, keep it dry for at least 2 days.  
· Squeeze a soft ball or other object as instructed after the access is placed. This will help blood flow through the access and help prevent blood clots. · After you have dialysis, check to see whether the access bleeds or swells. Let your doctor know if your arm bleeds or swells. · Do not lift anything heavy with the arm that has the access. · Do not bump your arm. · Do not wear tight clothing or jewelry over the access. · Do not sleep with your access arm under your body. · Have blood drawn or blood pressure taken from your other arm. · Keep the access clean and dry. · Do not put cream or lotion on or near the access. When should you call for help? Call your doctor now or seek immediate medical care if: 
  · You have signs of infection, such as: 
¨ Increased pain, swelling, warmth, or redness around the access. ¨ Red streaks leading from the access. ¨ Pus draining from the access. ¨ A fever.  
  · You do not feel a pulse in your access.  
 Watch closely for changes in your health, and be sure to contact your doctor if: 
  · You do not get better as expected. Where can you learn more? Go to http://vitaly-isaac.info/. Enter L169 in the search box to learn more about \"Hemodialysis Vascular Access: Care Instructions. \" Current as of: May 12, 2017 Content Version: 11.7 © 7413-0526 DTT. Care instructions adapted under license by [employer] (which disclaims liability or warranty for this information). If you have questions about a medical condition or this instruction, always ask your healthcare professional. [place] [address] any warranty or liability for your use of this information. Diet for End-Stage Renal Disease (Dialysis): Care Instructions Your Care Instructions You need to change your diet when you are on dialysis for end-stage renal disease (kidney failure). You will need more protein than you did before you started dialysis. You may need to limit salt and fluids.  You also may need to limit minerals such as potassium and phosphorus. A diet for end-stage renal disease takes planning. A dietitian who specializes in kidney disease can help you plan meals that meet your needs. Your nutrition needs depend on the type of dialysis you get. Talk with your doctor or dietitian to make sure your diet is right for your condition. Do not change your diet without talking to your doctor or dietitian. Follow-up care is a key part of your treatment and safety. Be sure to make and go to all appointments, and call your doctor if you are having problems. It's also a good idea to know your test results and keep a list of the medicines you take. How can you care for yourself at home? · Work with your doctor or dietitian to create a food plan that guides your daily food choices. · Do not skip meals or go for many hours without eating. If you do not feel very hungry, try to eat 4 or 5 small meals instead of 1 or 2 big meals. · If you have a hard time eating enough, talk to your doctor or dietitian about ways you can add calories to your diet. · Do not take any vitamins or minerals, supplements, or herbal products without talking to your doctor first. 
· Check with your doctor about whether it is safe for you to drink alcohol. · Check with your doctor about how much fluid you can drink each day. Drinking a lot of fluid can cause you to gain too much water weight between dialysis sessions. To get the right amount of protein · Ask your doctor or dietitian how much protein you can have each day. You will probably need more protein while you are on dialysis than you did before you started dialysis. · Choose high-quality protein sources, such as lean meat, poultry, and fish. To limit salt · Read food labels on cans and food packages. The labels tell you how much sodium is in each serving. Make sure that you look at the serving size.  If you eat more than the serving size, you will get more sodium than what is listed on the label. · Do not add salt to your food. Avoid foods that list salt, sodium, or monosodium glutamate (MSG) as an ingredient. · Buy foods that are labeled \"no salt added,\" \"sodium-free,\" or \"low-sodium. \" Foods labeled \"reduced-sodium\" and \"light sodium\" may still have too much sodium. · Limit processed foods, fast food, and restaurant foods. These types of food are very high in sodium. · Avoid salted pretzels, chips, popcorn, and other salted snacks. · Avoid smoked, cured, salted, and canned meat, fish, and poultry. This includes ham, de guzman, hot dogs, and luncheon meats. · You may use lemon, herbs, and spices to flavor your meals. To limit fluids · Know how much fluid you can drink. Every day fill a pitcher with that amount of water. If you drink another fluid (such as coffee) that day, pour an equal amount out of the pitcher. · Count foods that are liquid at room temperature, such as gelatin dessert and ice cream, as fluids. To limit potassium · Choose low-potassium fruits such as applesauce, pineapple, grapes, blueberries, and raspberries. · Choose low-potassium vegetables such as lettuce, green beans, cucumber, and radishes. · Choose low-potassium foods such as hummus, spaghetti, macaroni, rice, tortillas, and bagels. · Limit or avoid high-potassium foods such as milk, bananas, oranges, cantaloupe, potatoes, spinach, tomatoes, broccoli, and sweet potatoes. · Do not use a salt substitute or lite salt unless your doctor says it is okay. Most salt substitutes and lite salts are high in potassium. To limit phosphorus · Follow your food plan to know how much milk and milk products you can have. · Avoid nuts, peanut butter, seeds, lentils, beans, organ meats, and sardines. · Avoid cola drinks. · Avoid bran breads or bran cereals. · Take phosphate binders as directed, if prescribed by your doctor. Where can you learn more? Go to http://vitaly-isaac.info/. Enter S163 in the search box to learn more about \"Diet for End-Stage Renal Disease (Dialysis): Care Instructions. \" Current as of: June 5, 2017 Content Version: 11.7 © 1426-5375 [employer]. Care instructions adapted under license by [employer] (which disclaims liability or warranty for this information). If you have questions about a medical condition or this instruction, always ask your healthcare professional. [address] any warranty or liability for your use of this information. Introducing [address]! Dear [place]: Thank you for requesting a 1bib account. Our records indicate that you already have an active 1bib account. You can access your account anytime at [email or web address] Did you know that you can access your hospital and ER discharge instructions at any time in 1bib? You can also review all of your test results from your hospital stay or ER visit. Additional Information If you have questions, please visit the Frequently Asked Questions section of the 1bib website at [email or web address] Remember, 1bib is NOT to be used for urgent needs. For medical emergencies, dial 911. Now available from your iPhone and Android! Please provide this summary of care documentation to your next provider. Your primary care clinician is listed as Abby Stafford. If you have any questions after today's visit, please call [phone].

## 2018-09-26 NOTE — PATIENT INSTRUCTIONS
Diabetic Renal Diet: Care Instructions  Your Care Instructions    You may already be spreading carbohydrate throughout your daily meals. When you also have kidney disease, you need to avoid foods that make your kidneys worse. Keep your blood sugar and blood pressure as near normal as you can to reduce your chance of kidney failure. Your doctor and dietitian will help you make an eating plan. It will be based on your body weight, size, and medical condition. You may need to limit salt, fluids, and protein. You also may need to limit minerals such as potassium and phosphorus. It takes planning, but there are plenty of tasty, healthy foods you can eat. Always talk with your doctor or dietitian before you make changes in your diet. Follow-up care is a key part of your treatment and safety. Be sure to make and go to all appointments, and call your doctor if you are having problems. It's also a good idea to know your test results and keep a list of the medicines you take. How can you care for yourself at home? · Work with your doctor or dietitian to create a food plan that guides your daily food choices. · Eat regular meals. Do not skip meals or go for many hours without eating. To help control your blood sugar, try to eat several small meals during the day, rather than three large ones. · You can use margarine, mayonnaise, and oil to add calories to your diet for energy. The healthiest oils are olive, canola, and safflower oils. · Talk to your dietitian about eating sweets, including honey and sugar. · Be safe with medicines. Take your medicines exactly as prescribed. Call your doctor if you think you are having a problem with your medicine. You will get more details on the specific medicines your doctor prescribes. · Do not take any other medicine without talking to your doctor first. This includes over-the-counter medicines, vitamins, and herbal products. · Limit alcohol to no more than 1 drink per day. Count it as part of your fluid allowance. To get the right amount of protein  · Ask your doctor or dietitian how much protein you can have each day. You need some protein to stay healthy. · Include all sources of protein in your daily protein count. Besides meat, poultry, and fish, protein is found in milk and milk products, beans, peas, lentils, nuts, seeds, tofu, and eggs. Check for protein on the nutrition facts label found on packages of food such as bread and cereal.  To limit salt  · Do not add salt to your food. Avoid foods that list salt, sodium, or MSG as an ingredient. And look for \"reduced salt\" or \"low sodium\" on labels. · Do not use a salt substitute or lite salt unless your doctor says it is okay. (These products are high in potassium.)  · Avoid or use very small amounts of condiments and marinades. These include soy sauce, fish sauce, and barbecue sauce. They are high in sodium. · Avoid salted pretzels, chips, and other salted snacks. · Check food labels to become more aware of the sodium content of foods. Foods that are high in sodium include soups; many canned foods; cured, smoked, or dried meats; and many packaged foods. To control carbohydrate  · Spread carbohydrate throughout the day. This helps to prevent high blood sugar after meals. Ask your dietitian how much carbohydrate you can have. Carbohydrate foods include:  ¨ Whole-grain and refined breads and cereals, and some vegetables such as peas and beans. ¨ Fruits, milk, and milk products (except cheese). ¨ Candy, table sugar, and regular carbonated drinks. To limit fluids  · Know what your fluid allowance is. Fill a pitcher with that amount of water every day. If you drink another fluid (such as coffee) that day, pour an equal amount out of the pitcher. · Foods that are liquid at room temperature count as fluids. These include ice cream and gelatin desserts such as Jell-O.   To limit potassium  · Fruits that are low in potassium include blueberries and raspberries. · Vegetables that are low in potassium include cucumber and radishes. To limit phosphorus  · Follow your doctor's or dietitian's plan for your limit on milk and milk products in your diet. · Avoid nuts, peanut butter, seeds, lentils, beans, organ meats, and sardines. · Avoid cola drinks. · Avoid bran breads or bran cereals. They are high in phosphorus. Where can you learn more? Go to http://vitaly-isaac.info/. Enter I452 in the search box to learn more about \"Diabetic Renal Diet: Care Instructions. \"  Current as of: December 7, 2017  Content Version: 11.7  © 2905-6453 [employer]. Care instructions adapted under license by [employer] (which disclaims liability or warranty for this information). If you have questions about a medical condition or this instruction, always ask your healthcare professional. [place] [address] any warranty or liability for your use of this information. Hemodialysis Vascular Access: Care Instructions  Your Care Instructions  Hemodialysis, or dialysis, is the use of a machine to remove wastes from your blood. You need it if your kidneys are not able to remove wastes on their own. A dialysis access is the place in your arm, or sometimes in your leg, where a doctor creates a blood vessel that carries a large flow of blood. When you have dialysis, two needles are placed in this blood vessel and are connected to the dialysis machine. Your blood flows out of one needle and into the machine to be cleaned. Then your cleaned blood flows back into your body through the other needle. Sometimes, a doctor makes a short-term access through a tube, called a catheter, placed in your neck, upper chest, or groin. Your doctor creates an access during a minor surgery. You need to take care of your access to keep it working and to prevent infection.   Follow-up care is a key part of your treatment and safety. Be sure to make and go to all appointments, and call your doctor if you are having problems. It's also a good idea to know your test results and keep a list of the medicines you take. How can you care for yourself at home? · After your doctor creates an access, keep it dry for at least 2 days. · Squeeze a soft ball or other object as instructed after the access is placed. This will help blood flow through the access and help prevent blood clots. · After you have dialysis, check to see whether the access bleeds or swells. Let your doctor know if your arm bleeds or swells. · Do not lift anything heavy with the arm that has the access. · Do not bump your arm. · Do not wear tight clothing or jewelry over the access. · Do not sleep with your access arm under your body. · Have blood drawn or blood pressure taken from your other arm. · Keep the access clean and dry. · Do not put cream or lotion on or near the access. When should you call for help? Call your doctor now or seek immediate medical care if:    · You have signs of infection, such as:  ¨ Increased pain, swelling, warmth, or redness around the access. ¨ Red streaks leading from the access. ¨ Pus draining from the access. ¨ A fever.     · You do not feel a pulse in your access.    Watch closely for changes in your health, and be sure to contact your doctor if:    · You do not get better as expected. Where can you learn more? Go to http://vitaly-isaac.info/. Enter L169 in the search box to learn more about \"Hemodialysis Vascular Access: Care Instructions. \"  Current as of: May 12, 2017  Content Version: 11.7  © 6958-4860 [employer]. Care instructions adapted under license by [employer] (which disclaims liability or warranty for this information).  If you have questions about a medical condition or this instruction, always ask your healthcare professional. Karel Jaimes any warranty or liability for your use of this information. Diet for End-Stage Renal Disease (Dialysis): Care Instructions  Your Care Instructions  You need to change your diet when you are on dialysis for end-stage renal disease (kidney failure). You will need more protein than you did before you started dialysis. You may need to limit salt and fluids. You also may need to limit minerals such as potassium and phosphorus. A diet for end-stage renal disease takes planning. A dietitian who specializes in kidney disease can help you plan meals that meet your needs. Your nutrition needs depend on the type of dialysis you get. Talk with your doctor or dietitian to make sure your diet is right for your condition. Do not change your diet without talking to your doctor or dietitian. Follow-up care is a key part of your treatment and safety. Be sure to make and go to all appointments, and call your doctor if you are having problems. It's also a good idea to know your test results and keep a list of the medicines you take. How can you care for yourself at home? · Work with your doctor or dietitian to create a food plan that guides your daily food choices. · Do not skip meals or go for many hours without eating. If you do not feel very hungry, try to eat 4 or 5 small meals instead of 1 or 2 big meals. · If you have a hard time eating enough, talk to your doctor or dietitian about ways you can add calories to your diet. · Do not take any vitamins or minerals, supplements, or herbal products without talking to your doctor first.  · Check with your doctor about whether it is safe for you to drink alcohol. · Check with your doctor about how much fluid you can drink each day. Drinking a lot of fluid can cause you to gain too much water weight between dialysis sessions. To get the right amount of protein  · Ask your doctor or dietitian how much protein you can have each day.  You will probably need more protein while you are on dialysis than you did before you started dialysis. · Choose high-quality protein sources, such as lean meat, poultry, and fish. To limit salt  · Read food labels on cans and food packages. The labels tell you how much sodium is in each serving. Make sure that you look at the serving size. If you eat more than the serving size, you will get more sodium than what is listed on the label. · Do not add salt to your food. Avoid foods that list salt, sodium, or monosodium glutamate (MSG) as an ingredient. · Buy foods that are labeled \"no salt added,\" \"sodium-free,\" or \"low-sodium. \" Foods labeled \"reduced-sodium\" and \"light sodium\" may still have too much sodium. · Limit processed foods, fast food, and restaurant foods. These types of food are very high in sodium. · Avoid salted pretzels, chips, popcorn, and other salted snacks. · Avoid smoked, cured, salted, and canned meat, fish, and poultry. This includes ham, de guzman, hot dogs, and luncheon meats. · You may use lemon, herbs, and spices to flavor your meals. To limit fluids  · Know how much fluid you can drink. Every day fill a pitcher with that amount of water. If you drink another fluid (such as coffee) that day, pour an equal amount out of the pitcher. · Count foods that are liquid at room temperature, such as gelatin dessert and ice cream, as fluids. To limit potassium  · Choose low-potassium fruits such as applesauce, pineapple, grapes, blueberries, and raspberries. · Choose low-potassium vegetables such as lettuce, green beans, cucumber, and radishes. · Choose low-potassium foods such as hummus, spaghetti, macaroni, rice, tortillas, and bagels. · Limit or avoid high-potassium foods such as milk, bananas, oranges, cantaloupe, potatoes, spinach, tomatoes, broccoli, and sweet potatoes. · Do not use a salt substitute or lite salt unless your doctor says it is okay. Most salt substitutes and lite salts are high in potassium.   To limit phosphorus  · Follow your food plan to know how much milk and milk products you can have. · Avoid nuts, peanut butter, seeds, lentils, beans, organ meats, and sardines. · Avoid cola drinks. · Avoid bran breads or bran cereals. · Take phosphate binders as directed, if prescribed by your doctor. Where can you learn more? Go to http://vitaly-isaac.info/. Enter P886 in the search box to learn more about \"Diet for End-Stage Renal Disease (Dialysis): Care Instructions. \"  Current as of: June 5, 2017  Content Version: 11.7  © 0899-1966 [employer]. Care instructions adapted under license by [email or web address] (which disclaims liability or warranty for this information). If you have questions about a medical condition or this instruction, always ask your healthcare professional. [address] any warranty or liability for your use of this information.

## 2018-09-26 NOTE — PROGRESS NOTES
Chief Complaint   Patient presents with    Hypertension    Anemia     1. Have you been to the ER, urgent care clinic since your last visit? Hospitalized since your last visit? Yes When: 9/6/18 Where: [facility] Reason for visit: Elevaed Blood pressure. 2. Have you seen or consulted any other health care providers outside of the [address] [place] [address] since your last visit? Include any pap smears or colon screening.  No

## 2018-09-27 DIAGNOSIS — I10 ESSENTIAL HYPERTENSION: ICD-10-CM

## 2018-09-27 DIAGNOSIS — I10 UNCONTROLLED HYPERTENSION: ICD-10-CM

## 2018-09-27 RX ORDER — HYDRALAZINE HYDROCHLORIDE 25 MG/1
TABLET, FILM COATED ORAL
Qty: 810 TAB | Refills: 0 | Status: SHIPPED | OUTPATIENT
Start: 2018-09-27 | End: 2018-10-30 | Stop reason: ALTCHOICE

## 2018-10-01 ENCOUNTER — OFFICE VISIT (OUTPATIENT)
Dept: SURGERY | Age: 58
End: 2018-10-01

## 2018-10-01 VITALS
WEIGHT: 173.2 LBS | HEIGHT: 65 IN | TEMPERATURE: 97.9 F | HEART RATE: 66 BPM | BODY MASS INDEX: 28.86 KG/M2 | SYSTOLIC BLOOD PRESSURE: 115 MMHG | DIASTOLIC BLOOD PRESSURE: 75 MMHG | OXYGEN SATURATION: 100 %

## 2018-10-01 DIAGNOSIS — N18.5 CKD (CHRONIC KIDNEY DISEASE) STAGE 5, GFR LESS THAN 15 ML/MIN (HCC): Primary | ICD-10-CM

## 2018-10-02 ENCOUNTER — TELEPHONE (OUTPATIENT)
Dept: SURGERY | Age: 58
End: 2018-10-02

## 2018-10-02 PROBLEM — N18.5 CKD (CHRONIC KIDNEY DISEASE) STAGE 5, GFR LESS THAN 15 ML/MIN (HCC): Status: ACTIVE | Noted: 2018-10-02

## 2018-10-10 ENCOUNTER — OFFICE VISIT (OUTPATIENT)
Dept: CARDIOLOGY CLINIC | Age: 58
End: 2018-10-10

## 2018-10-10 VITALS
RESPIRATION RATE: 18 BRPM | DIASTOLIC BLOOD PRESSURE: 62 MMHG | OXYGEN SATURATION: 98 % | HEART RATE: 64 BPM | WEIGHT: 169 LBS | HEIGHT: 65 IN | SYSTOLIC BLOOD PRESSURE: 104 MMHG | BODY MASS INDEX: 28.16 KG/M2

## 2018-10-10 DIAGNOSIS — Z01.818 PREOPERATIVE CLEARANCE: Primary | ICD-10-CM

## 2018-10-10 DIAGNOSIS — G47.30 SLEEP APNEA, UNSPECIFIED TYPE: ICD-10-CM

## 2018-10-10 DIAGNOSIS — N18.5 CKD (CHRONIC KIDNEY DISEASE) STAGE 5, GFR LESS THAN 15 ML/MIN (HCC): ICD-10-CM

## 2018-10-10 DIAGNOSIS — N18.6 ESRF (END STAGE RENAL FAILURE) (HCC): ICD-10-CM

## 2018-10-10 DIAGNOSIS — E78.5 HYPERLIPIDEMIA LDL GOAL <100: ICD-10-CM

## 2018-10-10 DIAGNOSIS — I10 ESSENTIAL HYPERTENSION: ICD-10-CM

## 2018-10-10 DIAGNOSIS — Z90.5 HISTORY OF NEPHRECTOMY: ICD-10-CM

## 2018-10-10 DIAGNOSIS — D72.829 LEUKOCYTOSIS, UNSPECIFIED TYPE: Primary | ICD-10-CM

## 2018-10-10 DIAGNOSIS — D75.839 THROMBOCYTOSIS: ICD-10-CM

## 2018-10-10 DIAGNOSIS — Z86.73 HISTORY OF STROKE: ICD-10-CM

## 2018-10-10 DIAGNOSIS — R07.9 CHEST PAIN, UNSPECIFIED TYPE: ICD-10-CM

## 2018-10-10 LAB
BASOPHILS # BLD AUTO: 0 X10E3/UL (ref 0–0.2)
BASOPHILS NFR BLD AUTO: 0 %
CHOLEST SERPL-MCNC: 191 MG/DL (ref 100–199)
EOSINOPHIL # BLD AUTO: 0.1 X10E3/UL (ref 0–0.4)
EOSINOPHIL NFR BLD AUTO: 1 %
ERYTHROCYTE [DISTWIDTH] IN BLOOD BY AUTOMATED COUNT: 15.3 % (ref 12.3–15.4)
FERRITIN SERPL-MCNC: 191 NG/ML (ref 30–400)
FOLATE SERPL-MCNC: 10.3 NG/ML
HCT VFR BLD AUTO: 28 % (ref 37.5–51)
HDLC SERPL-MCNC: 34 MG/DL
HGB BLD-MCNC: 9 G/DL (ref 13–17.7)
IMM GRANULOCYTES # BLD: 0 X10E3/UL (ref 0–0.1)
IMM GRANULOCYTES NFR BLD: 0 %
IRON SATN MFR SERPL: 19 % (ref 15–55)
IRON SERPL-MCNC: 41 UG/DL (ref 38–169)
LDLC SERPL CALC-MCNC: 111 MG/DL (ref 0–99)
LYMPHOCYTES # BLD AUTO: 0.6 X10E3/UL (ref 0.7–3.1)
LYMPHOCYTES NFR BLD AUTO: 5 %
MCH RBC QN AUTO: 25.6 PG (ref 26.6–33)
MCHC RBC AUTO-ENTMCNC: 32.1 G/DL (ref 31.5–35.7)
MCV RBC AUTO: 80 FL (ref 79–97)
MONOCYTES # BLD AUTO: 0.8 X10E3/UL (ref 0.1–0.9)
MONOCYTES NFR BLD AUTO: 7 %
NEUTROPHILS # BLD AUTO: 10.2 X10E3/UL (ref 1.4–7)
NEUTROPHILS NFR BLD AUTO: 87 %
PLATELET # BLD AUTO: 420 X10E3/UL (ref 150–379)
RBC # BLD AUTO: 3.52 X10E6/UL (ref 4.14–5.8)
RETICS/RBC NFR AUTO: 2.6 % (ref 0.6–2.6)
TIBC SERPL-MCNC: 216 UG/DL (ref 250–450)
TRIGL SERPL-MCNC: 231 MG/DL (ref 0–149)
UIBC SERPL-MCNC: 175 UG/DL (ref 111–343)
VIT B12 SERPL-MCNC: 477 PG/ML (ref 232–1245)
VLDLC SERPL CALC-MCNC: 46 MG/DL (ref 5–40)
WBC # BLD AUTO: 11.7 X10E3/UL (ref 3.4–10.8)

## 2018-10-10 NOTE — PROGRESS NOTES
1. Have you been to the ER, urgent care clinic since your last visit? Hospitalized since your last visit? [id number] ER 9-6-18 CP. 2. Have you seen or consulted any other health care providers outside of the [address] [place] [address] since your last visit? Include any pap smears or colon screening. NO     Chief Complaint   Patient presents with    Chest Pain     NP, ref by Dr. Apryl Ivy.  Preop fistula for dialysis. C/O CP with exertion.

## 2018-10-10 NOTE — PROGRESS NOTES
[facility]        [phone]                             NEW PATIENT HPI/FOLLOW-UP    NAME:  Larissa Yanes         :   1960   MRN:   [id number]   PCP:  Bing Johnson MD           Subjective: The patient is a 60y.o. year old male, former smoker, a [place] ex-patriot with PMHX of HTN, dyslipidemia, stroke/TIA, BRISSA, anemia, right nephrectomy  for large kidney stones, ESRD essentially referred for cardiac clearance prior to creation of left arm AV FISTULA. Patient reports a remarkable journey from his home country of [facility] eventually here to [facility]. Journey was fraught with numerous traumatic events which have resulted per patient into numerous recurring panic attacks. During panic attacks which are sudden and precipitated by thoughts of traumatic events in [place]. During these moments states that he develops ground swells of overwhelming anxiety, chest pressure and SOB. Episodes are frequent and during these times has to call relatives in [address] [place] [address] to make sure \"they are okay\". Denies change in exercise tolerance, exertional chest pain, edema, medication intolerance, palpitations, PND/orthopnea wheezing, sputum, syncope, dizziness or light headedness.        Review of System:      [] Unable to obtain  ROS due to  []mental status change  []sedated   []intubated   [x]Total of 12 systems reviewed as follows:  Constitutional: negative fever, negative chills, negative weight loss  Eyes:   negative visual changes  ENT:   negative sore throat, tongue or lip swelling  Chest/Resp:  negative cough, wheezing, negative dyspnea,tenderness  Cards:  +chest pain, -decreased exercise endurance, palpitations, lower extremity edema,PND,orthopnea,syncpoe,dizziness,lightheadedness  GI:   negative for nausea, vomiting, diarrhea, and abdominal pain  :  negative for frequency, dysuria  Integument:  negative for rash and pruritus  Heme:  negative for easy bruising and gum/nose bleeding  Musculoskel: negative for myalgias,  back pain and muscle weakness  Neuro:  negative for headaches, dizziness, vertigo  Psych:  +feelings of anxiety, ?depression     Past Medical History:   Diagnosis Date    Chronic kidney disease     Hypercholesterolemia     Hypertension     Sleep apnea     Stroke ([facility] [address]     TIA, stroke 2007     Patient Active Problem List    Diagnosis Date Noted    Chest pain 10/10/2018    Preoperative clearance 10/10/2018    CKD (chronic kidney disease) stage 5, GFR less than 15 ml/min ([facility] [address] 10/02/2018    ESRF (end stage renal failure) ([facility]for creation of AV fistula 09/26/2018    Iron deficiency anemia 04/27/2018    Acute gout 04/27/2018    Overweight (BMI 25.0-29.9) 03/21/2018    Essential hypertension 11/17/2017    History of nephrectomy 11/17/2017    Hyperlipidemia LDL goal <100 11/17/2017    History of stroke 11/17/2017    Sleep apnea 11/17/2017      Past Surgical History:   Procedure Laterality Date    HX HEENT      tonsillectomy in 2002    [address] [place] [address] [facility]    kidney removed    HX POLYPECTOMY      2009     No Known Allergies   Family History   Problem Relation Age of Onset    Heart Disease Mother     No Known Problems Father     Stroke Brother       Social History     Social History    Marital status:      Spouse name: N/A    Number of children: N/A    Years of education: N/A     Occupational History    Not on file. Social History Main Topics    Smoking status: Former Smoker     Packs/day: 1.50     Years: 40.00     Quit date: 10/10/2012    Smokeless tobacco: Never Used    Alcohol use No    Drug use: Not on file    Sexual activity: Not on file     Other Topics Concern    Not on file     Social History Narrative      Current Outpatient Prescriptions   Medication Sig    hydrALAZINE (APRESOLINE) 25 mg tablet TAKE 3 TABLETS BY MOUTH THREE TIMES DAILY    ubidecarenone (CO Q-10 PO) Take  by mouth daily.     amLODIPine (NORVASC) 10 mg tablet TK 1 T PO QD    sevelamer carbonate (RENVELA) 800 mg tab tab TK 1 T PO TID WITH MEALS    clopidogrel (PLAVIX) 75 mg tab TAKE 1 TABLET BY MOUTH EVERY DAY    ferrous sulfate 325 mg (65 mg iron) tablet Take 1 Tab by mouth Daily (before breakfast).  vitamin e (AQUA GEMS) 200 unit capsule Take  by mouth daily.  cholecalciferol, VITAMIN D3, (VITAMIN D3) 5,000 unit tab tablet Take  by mouth daily.  metoprolol succinate (TOPROL XL) 100 mg tablet Take  by mouth daily.  cloNIDine HCl (CATAPRES) 0.3 mg tablet Take 0.3 mg by mouth two (2) times a day.  aspirin 81 mg chewable tablet Take 81 mg by mouth daily.  atorvastatin (LIPITOR) 80 mg tablet Take 80 mg by mouth daily. No current facility-administered medications for this visit. I have reviewed the nurses notes, vitals, problem list, allergy list, medical history, family medical, social history and medications. Objective:     Physical Exam:     Vitals:    10/10/18 1344 10/10/18 1345   BP: 118/62 104/62   Pulse: 64    Resp: 18    SpO2: 98%    Weight: 169 lb (76.7 kg)    Height: 5' 5\" (1.651 m)     Body mass index is 28.12 kg/(m^2). General: Well developed, in no acute distress. HEENT: No carotid bruits, no JVD, trach is midline. Heart:  Normal S1/S2 negative S3 or S4. Regular, no murmur, gallop or rub.   Respiratory: Clear bilaterally, no wheezing or rales  Abdomen:   Soft, non-tender, bowel sounds are active.   Extremities:  No edema, normal cap refill, no cyanosis. Neuro: A&Ox3, speech clear, gait stable. Skin: Skin color is normal. No rashes or lesions. No diaphoresis. Vascular: 2+ pulses symmetric in all extremities        Data Review:       Cardiographics:    EKG: NSR, early repolarization    Cardiology Labs:    No results found for this or any previous visit.     Lab Results   Component Value Date/Time    Cholesterol, total 191 10/09/2018 07:28 AM    HDL Cholesterol 34 (L) 10/09/2018 07:28 AM    LDL, calculated 111 (H) 10/09/2018 07:28 AM    Triglyceride 231 (H) 10/09/2018 07:28 AM       No results found for: NA, K, CL, CO2, AGAP, GLU, BUN, CREA, BUCR, GFRAA, GFRNA, CA, TBIL, TBILI, GPT, SGOT, AP, TP, ALB, GLOB, AGRAT, ALT       Assessment:       ICD-10-CM ICD-9-CM    1. Preoperative clearance Z01.818 V72.84 AMB POC EKG ROUTINE W/ 12 LEADS, INTER & REP      2D ECHO COMPLETE ADULT (TTE) W OR WO CONTR      NUCLEAR STRESS TEST      NM CARDIAC SPECT W STRS/REST MULT   2. Chest pain, unspecified type R07.9 786.50 AMB POC EKG ROUTINE W/ 12 LEADS, INTER & REP      2D ECHO COMPLETE ADULT (TTE) W OR WO CONTR      NUCLEAR STRESS TEST      NM CARDIAC SPECT W STRS/REST MULT   3. Essential hypertension I10 401.9 AMB POC EKG ROUTINE W/ 12 LEADS, INTER & REP      2D ECHO COMPLETE ADULT (TTE) W OR WO CONTR      NUCLEAR STRESS TEST      NM CARDIAC SPECT W STRS/REST MULT   4. Hyperlipidemia LDL goal <100 E78.5 272.4 AMB POC EKG ROUTINE W/ 12 LEADS, INTER & REP      2D ECHO COMPLETE ADULT (TTE) W OR WO CONTR      NUCLEAR STRESS TEST      NM CARDIAC SPECT W STRS/REST MULT   5. CKD (chronic kidney disease) stage 5, GFR less than 15 ml/min (HCC) N18.5 585.5 AMB POC EKG ROUTINE W/ 12 LEADS, INTER & REP      2D ECHO COMPLETE ADULT (TTE) W OR WO CONTR      NUCLEAR STRESS TEST      NM CARDIAC SPECT W STRS/REST MULT   6. History of nephrectomy Z90.5 V45.73 AMB POC EKG ROUTINE W/ 12 LEADS, INTER & REP      2D ECHO COMPLETE ADULT (TTE) W OR WO CONTR      NUCLEAR STRESS TEST      NM CARDIAC SPECT W STRS/REST MULT   7. History of stroke Z86.73 V12.54 AMB POC EKG ROUTINE W/ 12 LEADS, INTER & REP      2D ECHO COMPLETE ADULT (TTE) W OR WO CONTR      NUCLEAR STRESS TEST      NM CARDIAC SPECT W STRS/REST MULT   8. Sleep apnea, unspecified type G47.30 780.57 AMB POC EKG ROUTINE W/ 12 LEADS, INTER & REP      2D ECHO COMPLETE ADULT (TTE) W OR WO CONTR      NUCLEAR STRESS TEST      NM CARDIAC SPECT W STRS/REST MULT   9.  ESRF (end stage renal failure) ([facility]for creation of AV fistula N18.6 585.6 AMB POC EKG ROUTINE W/ 12 LEADS, INTER & REP      2D ECHO COMPLETE ADULT (TTE) W OR WO CONTR      NUCLEAR STRESS TEST      NM CARDIAC SPECT W STRS/REST MULT         Discussion: Patient presents at this time for cardiac clearance prior to creation of AV fistula with hx of episodes of ongoing non-exertional precordial chest pressure,SOB more noticeable over last few months. Given CAD risk factors, will obtain Lexiscan NST as he walks with cane due to OA and echo to exclude structural heart disease. Plan: 1. Continue same meds. Lipid profile and labs followed by PCP. 2.Encouraged to exercise to tolerance, lose weight and follow low fat, low cholesterol, low sodium predominantly Plant-based (consider Mediterranean) diet. Call with questions or concerns. Will follow up any test results by phone and/or f/u here in office if needed. Aidan Dinh 3.Follow up: 2-3 WEEKS    I have discussed the diagnosis with the patient and the intended plan as seen in the above orders. The patient has received an after-visit summary and questions were answered concerning future plans. I have discussed any concerning medication side effects and warnings with the patient as well.     Cele Cummins MD  10/10/2018

## 2018-10-11 NOTE — PROGRESS NOTES
Spoke to patient's wife on yesterday and patient does not have any obvious symptoms of active infection. States that they saw the cardiologist and currently in the process of work up with a stress test to be done before placement of dialysis port. Patient will come in this morning for urine test. Advised to follow up next week for labs to be done. Wife verbalized understanding.

## 2018-10-12 LAB
APPEARANCE UR: CLEAR
BACTERIA #/AREA URNS HPF: NORMAL /[HPF]
BILIRUB UR QL STRIP: NEGATIVE
CASTS URNS QL MICRO: NORMAL /LPF
COLOR UR: YELLOW
EPI CELLS #/AREA URNS HPF: NORMAL /HPF
GLUCOSE UR QL: NEGATIVE
HGB UR QL STRIP: NEGATIVE
KETONES UR QL STRIP: NEGATIVE
LEUKOCYTE ESTERASE UR QL STRIP: NEGATIVE
MICRO URNS: ABNORMAL
MUCOUS THREADS URNS QL MICRO: PRESENT
NITRITE UR QL STRIP: NEGATIVE
PH UR STRIP: 5.5 [PH] (ref 5–7.5)
PROT UR QL STRIP: ABNORMAL
RBC #/AREA URNS HPF: NORMAL /HPF
SP GR UR: 1.01 (ref 1–1.03)
SPECIMEN STATUS REPORT, ROLRST: NORMAL
UROBILINOGEN UR STRIP-MCNC: 0.2 MG/DL (ref 0.2–1)
WBC #/AREA URNS HPF: NORMAL /HPF

## 2018-10-17 DIAGNOSIS — D72.829 LEUKOCYTOSIS, UNSPECIFIED TYPE: ICD-10-CM

## 2018-10-17 DIAGNOSIS — D75.839 THROMBOCYTOSIS: ICD-10-CM

## 2018-11-06 ENCOUNTER — TELEPHONE (OUTPATIENT)
Dept: INTERNAL MEDICINE CLINIC | Facility: CLINIC | Age: 58
End: 2018-11-06

## 2018-11-06 NOTE — TELEPHONE ENCOUNTER
Spoke to patient's wife who states that patient's b/p was not managed so he was told to stop taking the Hydralazine.  Wanted provider to be aware

## 2018-11-08 ENCOUNTER — CLINICAL SUPPORT (OUTPATIENT)
Dept: CARDIOLOGY CLINIC | Age: 58
End: 2018-11-08

## 2018-11-08 DIAGNOSIS — Z86.73 HISTORY OF STROKE: ICD-10-CM

## 2018-11-08 DIAGNOSIS — N18.5 CKD (CHRONIC KIDNEY DISEASE) STAGE 5, GFR LESS THAN 15 ML/MIN (HCC): ICD-10-CM

## 2018-11-08 DIAGNOSIS — G47.30 SLEEP APNEA, UNSPECIFIED TYPE: ICD-10-CM

## 2018-11-08 DIAGNOSIS — R07.9 CHEST PAIN, UNSPECIFIED TYPE: ICD-10-CM

## 2018-11-08 DIAGNOSIS — E78.5 HYPERLIPIDEMIA LDL GOAL <100: ICD-10-CM

## 2018-11-08 DIAGNOSIS — Z90.5 HISTORY OF NEPHRECTOMY: ICD-10-CM

## 2018-11-08 DIAGNOSIS — N18.6 ESRF (END STAGE RENAL FAILURE) (HCC): ICD-10-CM

## 2018-11-08 DIAGNOSIS — I10 ESSENTIAL HYPERTENSION: ICD-10-CM

## 2018-11-08 DIAGNOSIS — Z01.818 PREOPERATIVE CLEARANCE: ICD-10-CM

## 2018-11-14 ENCOUNTER — TELEPHONE (OUTPATIENT)
Dept: CARDIOLOGY CLINIC | Age: 58
End: 2018-11-14

## 2018-11-14 NOTE — TELEPHONE ENCOUNTER
Spoke with wife, Carmel, on HPI. Verified patient with two patient identifiers. Discussed echo results with her. Advised keep good control of BP. States he only wears CPAP rare to occasionally. Advised should wear it every time he sleeps. She verbalized understanding.

## 2018-11-14 NOTE — TELEPHONE ENCOUNTER
----- Message from Dai Barney MD sent at 11/12/2018  5:46 PM EST -----  Regarding: ECHO  NORMAL SQUEEZING HEART FUNCTION AND MILD TO MODERATE THICKNESS AND MILD VALVE LEAKAGE    WAIT STRESS TEST RESULTS    B  ----- Message -----  From: Geovanni, Card Result In  Sent: 11/12/2018   1:08 PM  To:  Dai Barney MD

## 2018-11-20 ENCOUNTER — CLINICAL SUPPORT (OUTPATIENT)
Dept: CARDIOLOGY CLINIC | Age: 58
End: 2018-11-20

## 2018-11-20 DIAGNOSIS — Z01.818 PREOP EXAMINATION: Primary | ICD-10-CM

## 2018-11-26 ENCOUNTER — TELEPHONE (OUTPATIENT)
Dept: CARDIOLOGY CLINIC | Age: 58
End: 2018-11-26

## 2018-11-26 NOTE — TELEPHONE ENCOUNTER
Spoke with patient. Verified patient with two patient identifiers. Asked me to call his daughter, Belinda Lacey. Spoke with [facility]. Verified patient with two patient identifiers. Advised EST normal.    Echo normal except LVH. Watch BP. Return in 6 months. She verbalized understanding. Is pt cleared for surgery with Dr. Ambar Virk. Radha Baldwin? Please advise.

## 2018-11-27 NOTE — TELEPHONE ENCOUNTER
Per Monica Moon MD pt cleared for surgery at low to moderate risk. Faxing this note, last OV, EKG, EST to Dr. Nick Khan. Elmira Bello at fax # [phone]. Confirmation received.

## 2018-11-29 ENCOUNTER — TELEPHONE (OUTPATIENT)
Dept: CARDIOLOGY CLINIC | Age: 58
End: 2018-11-29

## 2018-11-29 NOTE — TELEPHONE ENCOUNTER
Lore Tyler with Dr. Suyapa Najera. Colleen Lamar called, ph # [phone] leave message or [phone] to have her paged. Verified patient with two patient identifiers. Pt is already cleared for surgery. Can he hold Plavix and ASA 5 days prior to surgery? Please advise.

## 2018-11-30 ENCOUNTER — TELEPHONE (OUTPATIENT)
Dept: SURGERY | Age: 58
End: 2018-11-30

## 2018-11-30 RX ORDER — CLONIDINE HYDROCHLORIDE 0.3 MG/1
0.3 TABLET ORAL 2 TIMES DAILY
Qty: 60 TAB | Refills: 2 | Status: SHIPPED | OUTPATIENT
Start: 2018-11-30 | End: 2022-10-04

## 2018-11-30 NOTE — TELEPHONE ENCOUNTER
Spoke with Mr Banks's daughter. Surgery scheduled for Tuesday, 1/15/19. Will call back regarding stopping Plavix & aspirin prior to surgery. Reviewed instructions & directions. Reanna or pt can call our office with any questions concerning surgery.

## 2018-12-04 NOTE — TELEPHONE ENCOUNTER
PLAVIX AND ASA   Received: 4 days ago   Message Contents   MD Domingo Roche LPN   Caller: Unspecified (5 days ago, 12:33 PM)             WE DID NOT PLACE ON EITHER     ? ON FOR OLS STROKE? DISCUSS WITH NEUROLOGY/PCP     B          Called Ji Yun and left message on her voice mail stating that [address] [place] [address] has advised he did not place pt on either medication and would like for her to discuss with either Neurology or PCP about stopping prior to surgery. Advised to call back if needed.

## 2018-12-06 NOTE — TELEPHONE ENCOUNTER
Spoke with Dr Matilde Solares daughter, Ms Hernandez Ha. Plavix & aspirin can be stopped 5 days before surgery. Will mail surgery information to pt.

## 2018-12-27 ENCOUNTER — OFFICE VISIT (OUTPATIENT)
Dept: INTERNAL MEDICINE CLINIC | Facility: CLINIC | Age: 58
End: 2018-12-27

## 2018-12-27 VITALS
TEMPERATURE: 97.8 F | SYSTOLIC BLOOD PRESSURE: 119 MMHG | BODY MASS INDEX: 26.82 KG/M2 | HEIGHT: 65 IN | OXYGEN SATURATION: 100 % | RESPIRATION RATE: 18 BRPM | DIASTOLIC BLOOD PRESSURE: 80 MMHG | HEART RATE: 67 BPM | WEIGHT: 161 LBS

## 2018-12-27 DIAGNOSIS — I10 ESSENTIAL HYPERTENSION: ICD-10-CM

## 2018-12-27 DIAGNOSIS — N18.6 ESRF (END STAGE RENAL FAILURE) (HCC): ICD-10-CM

## 2018-12-27 DIAGNOSIS — E78.5 HYPERLIPIDEMIA LDL GOAL <100: ICD-10-CM

## 2018-12-27 DIAGNOSIS — Z86.73 HISTORY OF CVA (CEREBROVASCULAR ACCIDENT): ICD-10-CM

## 2018-12-27 DIAGNOSIS — D50.9 IRON DEFICIENCY ANEMIA, UNSPECIFIED IRON DEFICIENCY ANEMIA TYPE: ICD-10-CM

## 2018-12-27 DIAGNOSIS — Z12.5 PROSTATE CANCER SCREENING: ICD-10-CM

## 2018-12-27 DIAGNOSIS — Z12.11 COLON CANCER SCREENING: ICD-10-CM

## 2018-12-27 DIAGNOSIS — Z00.00 PHYSICAL EXAM: Primary | ICD-10-CM

## 2018-12-27 DIAGNOSIS — Z23 ENCOUNTER FOR IMMUNIZATION: ICD-10-CM

## 2018-12-27 LAB
BILIRUB UR QL STRIP: NEGATIVE
GLUCOSE UR-MCNC: NEGATIVE MG/DL
KETONES P FAST UR STRIP-MCNC: NEGATIVE MG/DL
PH UR STRIP: 6 [PH] (ref 4.6–8)
PROT UR QL STRIP: NORMAL
SP GR UR STRIP: 1.02 (ref 1–1.03)
UA UROBILINOGEN AMB POC: NORMAL (ref 0.2–1)
URINALYSIS CLARITY POC: CLEAR
URINALYSIS COLOR POC: YELLOW
URINE BLOOD POC: NORMAL
URINE LEUKOCYTES POC: NEGATIVE
URINE NITRITES POC: NEGATIVE

## 2018-12-27 RX ORDER — CALCIUM ACETATE 667 MG/1
CAPSULE ORAL
COMMUNITY

## 2018-12-27 NOTE — PROGRESS NOTES
Subjective:      Lex León is a 60 y.o. male who presents today for   Chief Complaint   Patient presents with    Complete Physical     Fasting   patient in today for complete physical    He has a past med hx of BRISSA, ESRD, Hyperlipidemia, Hx CVA, iron deficiency anemia, Gout, Right nephrectomy  Currently undergoing dialysis, surgery is pending for AV fistula. He will see Dr. Kassandra Rg  He has dialysis 3 times per week  Sprained left ankle a few weeks ago      Patient has stopped his plavix  Stopped statin due to side effects    He has recently had an Echo and Stress Test.     PSA  Colonoscopy- has hx of polyps  Supplements  Diet/exercise- renal diet  Immunizations- will need pneumovax and TDAP, will give RX for shingrix  Dentist -will need to make appt  Ophthalmologist- was seen last year        Patient Active Problem List    Diagnosis Date Noted    Chest pain 10/10/2018    Preoperative clearance 10/10/2018    CKD (chronic kidney disease) stage 5, GFR less than 15 ml/min ([address] 10/02/2018    ESRF (end stage renal failure) ([facility])--for creation of AV fistula 09/26/2018    Iron deficiency anemia 04/27/2018    Acute gout 04/27/2018    Overweight (BMI 25.0-29.9) 03/21/2018    Essential hypertension 11/17/2017    History of nephrectomy 11/17/2017    Hyperlipidemia LDL goal <100 11/17/2017    History of stroke 11/17/2017    Sleep apnea 11/17/2017     Current Outpatient Medications   Medication Sig Dispense Refill    calcium acetate (PHOSLO) 667 mg cap Take  by mouth three (3) times daily (with meals).  cloNIDine HCl (CATAPRES) 0.3 mg tablet Take 1 Tab by mouth two (2) times a day. 60 Tab 2    ubidecarenone (CO Q-10 PO) Take  by mouth daily.  ferrous sulfate 325 mg (65 mg iron) tablet Take 1 Tab by mouth Daily (before breakfast). 90 Tab 0    vitamin e (AQUA GEMS) 200 unit capsule Take  by mouth daily.       cholecalciferol, VITAMIN D3, (VITAMIN D3) 5,000 unit tab tablet Take  by mouth daily.      metoprolol succinate (TOPROL XL) 100 mg tablet Take 50 mg by mouth daily.  aspirin 81 mg chewable tablet Take 81 mg by mouth daily.  sevelamer carbonate (RENVELA) 800 mg tab tab TK 1 T PO TID WITH MEALS  3    clopidogrel (PLAVIX) 75 mg tab TAKE 1 TABLET BY MOUTH EVERY DAY      atorvastatin (LIPITOR) 80 mg tablet Take 80 mg by mouth daily. No Known Allergies  Past Medical History:   Diagnosis Date    Chronic kidney disease     Hypercholesterolemia     Hypertension     Sleep apnea     Stroke ([address]     TIA, stroke      Past Surgical History:   Procedure Laterality Date    HX HEENT      tonsillectomy in     [address]    kidney removed    HX POLYPECTOMY           Family History   Problem Relation Age of Onset    Heart Disease Mother     No Known Problems Father     Stroke Brother      Social History     Tobacco Use    Smoking status: Former Smoker     Packs/day: 1.50     Years: 40.00     Pack years: 60.00     Last attempt to quit: 10/10/2012     Years since quittin.2    Smokeless tobacco: Never Used   Substance Use Topics    Alcohol use: No        Review of Systems    A comprehensive review of systems was negative except for that written in the HPI. Objective:     Visit Vitals  /80 (BP 1 Location: Right arm, BP Patient Position: Sitting)   Pulse 67   Temp 97.8 °F (36.6 °C) (Oral)   Resp 18   Ht 5' 5\" (1.651 m)   Wt 161 lb (73 kg)   SpO2 100%   BMI 26.79 kg/m²     General:  Alert, cooperative, no distress, appears stated age. Head:  Normocephalic, without obvious abnormality, atraumatic. Eyes:  Conjunctivae/corneas clear. PERRL, EOMs intact. Fundi benign. Ears:  Normal TMs and external ear canals both ears. Nose: Nares normal. Septum midline. Mucosa normal. No drainage or sinus tenderness.    Throat: Lips, mucosa, and tongue normal. Teeth and gums normal.   Neck: Supple, symmetrical, trachea midline, no adenopathy, thyroid: no enlargement/tenderness/nodules, no carotid bruit and no JVD. Back:   Symmetric, no curvature. ROM normal. No CVA tenderness. Lungs:   Clear to auscultation bilaterally. Chest wall:  No tenderness or deformity. Heart:  Regular rate and rhythm, S1, S2 normal, no murmur, click, rub or gallop. Abdomen:   Soft, non-tender. Bowel sounds normal. No masses,  No organomegaly. Extremities: Extremities normal, atraumatic, no cyanosis or edema. Pulses: 2+ and symmetric all extremities. Skin: Skin color, texture, turgor normal. No rashes or lesions. Lymph nodes: Cervical, supraclavicular, and axillary nodes normal.   Neurologic: CNII-XII intact. Normal strength, sensation and reflexes throughout. Assessment/Plan:       ICD-10-CM ICD-9-CM    1. Physical exam Z00.00 V70.9 PSA W/ REFLX FREE PSA      METABOLIC PANEL, COMPREHENSIVE      CBC WITH AUTOMATED DIFF      IRON      LIPID PANEL      HEMOGLOBIN A1C W/O EAG      AMB POC URINALYSIS DIP STICK AUTO W/O MICRO   2. Essential hypertension I10 401.9    3. ESRF (end stage renal failure) (HCC) N18.6 585.6    4. Iron deficiency anemia, unspecified iron deficiency anemia type D50.9 280.9 CBC WITH AUTOMATED DIFF      IRON   5. Hyperlipidemia LDL goal <100 E78.5 272.4 LIPID PANEL   6. Colon cancer screening Z12.11 V76.51 REFERRAL TO GASTROENTEROLOGY   7. Prostate cancer screening Z12.5 V76.44    8. History of CVA (cerebrovascular accident) Z86.73 V12.54    9.  Encounter for immunization Z23 V03.89 TETANUS, DIPHTHERIA TOXOIDS AND ACELLULAR PERTUSSIS VACCINE (TDAP), IN INDIVIDS. >=7, IM      PNEUMOCOCCAL POLYSACCHARIDE VACCINE, 23-VALENT, ADULT OR IMMUNOSUPPRESSED PT DOSE,     Resume plavix  Recheck choilesterol and may need to start on lower dose medication    Follow-up Disposition: Not on File   Advised him to call back or return to office if symptoms worsen/change/persist.  Discussed expected course/resolution/complications of diagnosis in detail with patient. Medication risks/benefits/costs/interactions/alternatives discussed with patient. He was given an after visit summary which includes diagnoses, current medications, & vitals. He expressed understanding with the diagnosis and plan.

## 2018-12-28 LAB
ALBUMIN SERPL-MCNC: 3.4 G/DL (ref 3.5–5.5)
ALBUMIN/GLOB SERPL: 1.3 {RATIO} (ref 1.2–2.2)
ALP SERPL-CCNC: 58 IU/L (ref 39–117)
ALT SERPL-CCNC: 5 IU/L (ref 0–44)
AST SERPL-CCNC: 13 IU/L (ref 0–40)
BASOPHILS # BLD AUTO: 0 X10E3/UL (ref 0–0.2)
BASOPHILS NFR BLD AUTO: 1 %
BILIRUB SERPL-MCNC: 0.3 MG/DL (ref 0–1.2)
BUN SERPL-MCNC: 38 MG/DL (ref 6–24)
BUN/CREAT SERPL: 8 (ref 9–20)
CALCIUM SERPL-MCNC: 8.9 MG/DL (ref 8.7–10.2)
CHLORIDE SERPL-SCNC: 96 MMOL/L (ref 96–106)
CHOLEST SERPL-MCNC: 225 MG/DL (ref 100–199)
CO2 SERPL-SCNC: 26 MMOL/L (ref 20–29)
CREAT SERPL-MCNC: 4.8 MG/DL (ref 0.76–1.27)
EOSINOPHIL # BLD AUTO: 0.4 X10E3/UL (ref 0–0.4)
EOSINOPHIL NFR BLD AUTO: 5 %
ERYTHROCYTE [DISTWIDTH] IN BLOOD BY AUTOMATED COUNT: 15.8 % (ref 12.3–15.4)
GLOBULIN SER CALC-MCNC: 2.7 G/DL (ref 1.5–4.5)
GLUCOSE SERPL-MCNC: 151 MG/DL (ref 65–99)
HBA1C MFR BLD: 5.7 % (ref 4.8–5.6)
HCT VFR BLD AUTO: 34.5 % (ref 37.5–51)
HDLC SERPL-MCNC: 34 MG/DL
HGB BLD-MCNC: 10.4 G/DL (ref 13–17.7)
IMM GRANULOCYTES # BLD: 0 X10E3/UL (ref 0–0.1)
IMM GRANULOCYTES NFR BLD: 0 %
IRON SERPL-MCNC: 37 UG/DL (ref 38–169)
LDLC SERPL CALC-MCNC: 144 MG/DL (ref 0–99)
LYMPHOCYTES # BLD AUTO: 1.2 X10E3/UL (ref 0.7–3.1)
LYMPHOCYTES NFR BLD AUTO: 18 %
MCH RBC QN AUTO: 24.8 PG (ref 26.6–33)
MCHC RBC AUTO-ENTMCNC: 30.1 G/DL (ref 31.5–35.7)
MCV RBC AUTO: 82 FL (ref 79–97)
MONOCYTES # BLD AUTO: 0.5 X10E3/UL (ref 0.1–0.9)
MONOCYTES NFR BLD AUTO: 7 %
NEUTROPHILS # BLD AUTO: 4.7 X10E3/UL (ref 1.4–7)
NEUTROPHILS NFR BLD AUTO: 69 %
PLATELET # BLD AUTO: 314 X10E3/UL (ref 150–379)
POTASSIUM SERPL-SCNC: 4.3 MMOL/L (ref 3.5–5.2)
PROT SERPL-MCNC: 6.1 G/DL (ref 6–8.5)
PSA SERPL-MCNC: 1.5 NG/ML (ref 0–4)
RBC # BLD AUTO: 4.2 X10E6/UL (ref 4.14–5.8)
REFLEX CRITERIA: NORMAL
SODIUM SERPL-SCNC: 140 MMOL/L (ref 134–144)
TRIGL SERPL-MCNC: 236 MG/DL (ref 0–149)
VLDLC SERPL CALC-MCNC: 47 MG/DL (ref 5–40)
WBC # BLD AUTO: 6.8 X10E3/UL (ref 3.4–10.8)

## 2019-01-04 NOTE — PROGRESS NOTES
Urine negative for infection Prostate test in normal range Glucose elevated- he is borderline prediabetic Kidney function consistent with being on dialysis Blood counts show anemia and iron is low- he needs to discuss this with his kidney doctor (nephrologist) Cholesterol is elevated- be sure to take medication daily

## 2019-01-10 ENCOUNTER — TELEPHONE (OUTPATIENT)
Dept: SURGERY | Age: 59
End: 2019-01-10

## 2019-01-10 NOTE — TELEPHONE ENCOUNTER
Daughter Nguyen Martinez would like an update on her father and questions about next week surg. Please return call. [phone]

## 2019-01-10 NOTE — TELEPHONE ENCOUNTER
Left v/m for Ms Deryl Bison call returned. Ms Abdulaziz Castillo returned call.   She states pt now has allergy to chlorahexidine & perm cath changed to left chest.

## 2019-01-14 RX ORDER — AMLODIPINE BESYLATE 10 MG/1
10 TABLET ORAL DAILY
COMMUNITY
End: 2019-11-11 | Stop reason: SDUPTHER

## 2019-01-14 NOTE — PERIOP NOTES
Patients spouse is uncomfortable with him taking meds so early; he normally takes around 0900. I told her he can bring them with him instead.

## 2019-01-14 NOTE — PERIOP NOTES
[facility] Preoperative Instructions Surgery Date 1/15/19          Time of Arrival 9557 1. On the day of your surgery, please report to the Surgical Services Registration Desk and sign in at your designated time. The Surgery Center is located to the right of the Emergency Room. 2. You must have someone with you to drive you home. You should not drive a car for 24 hours following surgery. Please make arrangements for a friend or family member to stay with you for the first 24 hours after your surgery. 3. Do not have anything to eat or drink (including water, gum, mints, coffee, juice) after midnight 1/14/19.?? Morna Rout ? This may not apply to medications prescribed by your physician. ?(Please note below the special instructions with medications to take the morning of your procedure.) 4. We recommend you do not drink any alcoholic beverages for 24 hours before and after your surgery. 5. Contact your surgeons office for instructions on the following medications: non-steroidal anti-inflammatory drugs (i.e. Advil, Aleve), vitamins, and supplements. (Some surgeons will want you to stop these medications prior to surgery and others may allow you to take them) **If you are currently taking Plavix, Coumadin, Aspirin and/or other blood-thinning agents, contact your surgeon for instructions. ** Your surgeon will partner with the physician prescribing these medications to determine if it is safe to stop or if you need to continue taking. Please do not stop taking these medications without instructions from your surgeon 6. Wear comfortable clothes. Wear glasses instead of contacts. Do not bring any money or jewelry. Please bring picture ID, insurance card, and any prearranged co-payment or hospital payment. Do not wear make-up, particularly mascara the morning of your surgery. Do not wear nail polish, particularly if you are having foot /hand surgery.   Wear your hair loose or down, no ponytails, buns, jes pins or clips. All body piercings must be removed. Please shower with antibacterial soap for three consecutive days before and on the morning of surgery, but do not apply any lotions, powders or deodorants after the shower on the day of surgery. Please use a fresh towels after each shower. Please sleep in clean clothes and change bed linens the night before surgery. Please do not shave for 48 hours prior to surgery. Shaving of the face is acceptable. 7. You should understand that if you do not follow these instructions your surgery may be cancelled. If your physical condition changes (I.e. fever, cold or flu) please contact your surgeon as soon as possible. 8. It is important that you be on time. If a situation occurs where you may be late, please call [phone] (OR Holding Area). 9. If you have any questions and or problems, please call [phone] (Pre-admission Testing). 10. Your surgery time may be subject to change. You will receive a phone call the evening prior if your time changes. 11.  If having outpatient surgery, you must have someone to drive you here, stay with you during the duration of your stay, and to drive you home at time of discharge. 12.   In an effort to improve the efficiency, privacy, and safety for all of our Pre-op patients visitors are not allowed in the Holding area. Once you arrive and are registered your family/visitors will be asked to remain in the waiting room. The Pre-op staff will get you from the Surgical Waiting Area and will explain to you and your family/visitors that the Pre-op phase is beginning. The staff will answer any questions and provide instructions for tracking of the patient, by use of the existing tracking number and color-coded status board in the waiting room.   At this time the staff will also ask for your designated spokesperson information in the event that the physician or staff need to provide an update or obtain any pertinent information. The designated spokesperson will be notified if the physician needs to speak to family during the pre-operative phase. If at any time your family/visitors has questions or concerns they may approach the volunteer desk in the waiting area for assistance. Special Instructions:Plavix & ASA per surgeon MEDICATIONS TO TAKE THE MORNING OF SURGERY WITH A SIP OF WATER:Norvasc,catapres. Takes metoprolol @ . I understand a pre-operative phone call will be made to verify my surgery time. In the event that I am not available, I give permission for a message to be left on my answering service and/or with another person? {yes @ [phone]; daughter Jigar Ohlaurie 
 
 
 
 ___________________      __________   _________ 
  (Signature of Patient)             (Witness)                (Date and Time)

## 2019-01-15 ENCOUNTER — ANESTHESIA (OUTPATIENT)
Dept: SURGERY | Age: 59
End: 2019-01-15
Payer: MEDICARE

## 2019-01-15 ENCOUNTER — HOSPITAL ENCOUNTER (OUTPATIENT)
Age: 59
Setting detail: OUTPATIENT SURGERY
Discharge: HOME | End: 2019-01-15
Attending: SURGERY | Admitting: SURGERY
Payer: MEDICARE

## 2019-01-15 ENCOUNTER — ANESTHESIA EVENT (OUTPATIENT)
Dept: SURGERY | Age: 59
End: 2019-01-15
Payer: MEDICARE

## 2019-01-15 VITALS
RESPIRATION RATE: 16 BRPM | BODY MASS INDEX: 26.7 KG/M2 | SYSTOLIC BLOOD PRESSURE: 131 MMHG | HEIGHT: 65 IN | TEMPERATURE: 98 F | DIASTOLIC BLOOD PRESSURE: 69 MMHG | WEIGHT: 160.27 LBS | HEART RATE: 77 BPM | OXYGEN SATURATION: 98 %

## 2019-01-15 DIAGNOSIS — L76.82 PAIN AT SURGICAL INCISION: Primary | ICD-10-CM

## 2019-01-15 LAB
ANION GAP BLD CALC-SCNC: 17 MMOL/L (ref 10–20)
BUN BLD-MCNC: 33 MG/DL (ref 9–20)
CA-I BLD-MCNC: 1.06 MMOL/L (ref 1.12–1.32)
CHLORIDE BLD-SCNC: 98 MMOL/L (ref 98–107)
CO2 BLD-SCNC: 29 MMOL/L (ref 21–32)
CREAT BLD-MCNC: 5.3 MG/DL (ref 0.6–1.3)
GLUCOSE BLD-MCNC: 134 MG/DL (ref 65–100)
HCT VFR BLD CALC: 33 % (ref 36.6–50.3)
POTASSIUM BLD-SCNC: 3.6 MMOL/L (ref 3.5–5.1)
SERVICE CMNT-IMP: ABNORMAL
SODIUM BLD-SCNC: 139 MMOL/L (ref 136–145)

## 2019-01-15 PROCEDURE — 77030020782 HC GWN BAIR PAWS FLX 3M -B

## 2019-01-15 PROCEDURE — 77030008684 HC TU ET CUF COVD -B: Performed by: ANESTHESIOLOGY

## 2019-01-15 PROCEDURE — 74011250636 HC RX REV CODE- 250/636: Performed by: ANESTHESIOLOGY

## 2019-01-15 PROCEDURE — 77030002987 HC SUT PROL J&J -B: Performed by: SURGERY

## 2019-01-15 PROCEDURE — 77030019908 HC STETH ESOPH SIMS -A: Performed by: ANESTHESIOLOGY

## 2019-01-15 PROCEDURE — 77030026438 HC STYL ET INTUB CARD -A: Performed by: ANESTHESIOLOGY

## 2019-01-15 PROCEDURE — 74011000272 HC RX REV CODE- 272: Performed by: SURGERY

## 2019-01-15 PROCEDURE — 77030002996 HC SUT SLK J&J -A: Performed by: SURGERY

## 2019-01-15 PROCEDURE — 76210000006 HC OR PH I REC 0.5 TO 1 HR: Performed by: SURGERY

## 2019-01-15 PROCEDURE — 77030011640 HC PAD GRND REM COVD -A: Performed by: SURGERY

## 2019-01-15 PROCEDURE — 77030020863 HC CLMP VASC BULDG SCAN -A: Performed by: SURGERY

## 2019-01-15 PROCEDURE — 77030018836 HC SOL IRR NACL ICUM -A: Performed by: SURGERY

## 2019-01-15 PROCEDURE — 77030020255 HC SOL INJ LR 1000ML BG: Performed by: SURGERY

## 2019-01-15 PROCEDURE — 77030008467 HC STPLR SKN COVD -B: Performed by: SURGERY

## 2019-01-15 PROCEDURE — 76060000036 HC ANESTHESIA 2.5 TO 3 HR: Performed by: SURGERY

## 2019-01-15 PROCEDURE — 74011000250 HC RX REV CODE- 250: Performed by: SURGERY

## 2019-01-15 PROCEDURE — 76010000132 HC OR TIME 2.5 TO 3 HR: Performed by: SURGERY

## 2019-01-15 PROCEDURE — 77030002916 HC SUT ETHLN J&J -A: Performed by: SURGERY

## 2019-01-15 PROCEDURE — 76210000020 HC REC RM PH II FIRST 0.5 HR: Performed by: SURGERY

## 2019-01-15 PROCEDURE — 74011250636 HC RX REV CODE- 250/636

## 2019-01-15 PROCEDURE — 74011250636 HC RX REV CODE- 250/636: Performed by: SURGERY

## 2019-01-15 PROCEDURE — 77030013567 HC DRN WND RESERV BARD -A: Performed by: SURGERY

## 2019-01-15 PROCEDURE — 77030002888 HC SUT CHRMC J&J -A: Performed by: SURGERY

## 2019-01-15 PROCEDURE — 74011000250 HC RX REV CODE- 250

## 2019-01-15 PROCEDURE — 77030032490 HC SLV COMPR SCD KNE COVD -B: Performed by: SURGERY

## 2019-01-15 PROCEDURE — 77030020153 HC PRB DOPLR DISP MIZU -C: Performed by: SURGERY

## 2019-01-15 PROCEDURE — 80047 BASIC METABLC PNL IONIZED CA: CPT

## 2019-01-15 RX ORDER — FENTANYL CITRATE 50 UG/ML
50 INJECTION, SOLUTION INTRAMUSCULAR; INTRAVENOUS AS NEEDED
Status: DISCONTINUED | OUTPATIENT
Start: 2019-01-15 | End: 2019-01-15 | Stop reason: HOSPADM

## 2019-01-15 RX ORDER — LIDOCAINE HYDROCHLORIDE 10 MG/ML
0.1 INJECTION, SOLUTION EPIDURAL; INFILTRATION; INTRACAUDAL; PERINEURAL AS NEEDED
Status: DISCONTINUED | OUTPATIENT
Start: 2019-01-15 | End: 2019-01-15 | Stop reason: HOSPADM

## 2019-01-15 RX ORDER — MORPHINE SULFATE 10 MG/ML
2 INJECTION, SOLUTION INTRAMUSCULAR; INTRAVENOUS
Status: DISCONTINUED | OUTPATIENT
Start: 2019-01-15 | End: 2019-01-15 | Stop reason: HOSPADM

## 2019-01-15 RX ORDER — ONDANSETRON 2 MG/ML
INJECTION INTRAMUSCULAR; INTRAVENOUS AS NEEDED
Status: DISCONTINUED | OUTPATIENT
Start: 2019-01-15 | End: 2019-01-15 | Stop reason: HOSPADM

## 2019-01-15 RX ORDER — SUCCINYLCHOLINE CHLORIDE 20 MG/ML
INJECTION INTRAMUSCULAR; INTRAVENOUS AS NEEDED
Status: DISCONTINUED | OUTPATIENT
Start: 2019-01-15 | End: 2019-01-15 | Stop reason: HOSPADM

## 2019-01-15 RX ORDER — SODIUM CHLORIDE 0.9 % (FLUSH) 0.9 %
5-40 SYRINGE (ML) INJECTION AS NEEDED
Status: DISCONTINUED | OUTPATIENT
Start: 2019-01-15 | End: 2019-01-15 | Stop reason: HOSPADM

## 2019-01-15 RX ORDER — ROCURONIUM BROMIDE 10 MG/ML
INJECTION, SOLUTION INTRAVENOUS AS NEEDED
Status: DISCONTINUED | OUTPATIENT
Start: 2019-01-15 | End: 2019-01-15 | Stop reason: HOSPADM

## 2019-01-15 RX ORDER — MIDAZOLAM HYDROCHLORIDE 1 MG/ML
INJECTION, SOLUTION INTRAMUSCULAR; INTRAVENOUS AS NEEDED
Status: DISCONTINUED | OUTPATIENT
Start: 2019-01-15 | End: 2019-01-15 | Stop reason: HOSPADM

## 2019-01-15 RX ORDER — FENTANYL CITRATE 50 UG/ML
INJECTION, SOLUTION INTRAMUSCULAR; INTRAVENOUS AS NEEDED
Status: DISCONTINUED | OUTPATIENT
Start: 2019-01-15 | End: 2019-01-15 | Stop reason: HOSPADM

## 2019-01-15 RX ORDER — FENTANYL CITRATE 50 UG/ML
25 INJECTION, SOLUTION INTRAMUSCULAR; INTRAVENOUS
Status: DISCONTINUED | OUTPATIENT
Start: 2019-01-15 | End: 2019-01-15 | Stop reason: HOSPADM

## 2019-01-15 RX ORDER — SODIUM CHLORIDE 9 MG/ML
50 INJECTION, SOLUTION INTRAVENOUS CONTINUOUS
Status: DISCONTINUED | OUTPATIENT
Start: 2019-01-15 | End: 2019-01-15 | Stop reason: HOSPADM

## 2019-01-15 RX ORDER — CEFAZOLIN SODIUM/WATER 2 G/20 ML
2 SYRINGE (ML) INTRAVENOUS ONCE
Status: DISCONTINUED | OUTPATIENT
Start: 2019-01-15 | End: 2019-01-15 | Stop reason: HOSPADM

## 2019-01-15 RX ORDER — MIDAZOLAM HYDROCHLORIDE 1 MG/ML
1 INJECTION, SOLUTION INTRAMUSCULAR; INTRAVENOUS AS NEEDED
Status: DISCONTINUED | OUTPATIENT
Start: 2019-01-15 | End: 2019-01-15 | Stop reason: HOSPADM

## 2019-01-15 RX ORDER — SODIUM CHLORIDE 0.9 % (FLUSH) 0.9 %
5-40 SYRINGE (ML) INJECTION EVERY 8 HOURS
Status: DISCONTINUED | OUTPATIENT
Start: 2019-01-15 | End: 2019-01-15 | Stop reason: HOSPADM

## 2019-01-15 RX ORDER — PROPOFOL 10 MG/ML
INJECTION, EMULSION INTRAVENOUS AS NEEDED
Status: DISCONTINUED | OUTPATIENT
Start: 2019-01-15 | End: 2019-01-15 | Stop reason: HOSPADM

## 2019-01-15 RX ORDER — SODIUM CHLORIDE, SODIUM LACTATE, POTASSIUM CHLORIDE, CALCIUM CHLORIDE 600; 310; 30; 20 MG/100ML; MG/100ML; MG/100ML; MG/100ML
75 INJECTION, SOLUTION INTRAVENOUS CONTINUOUS
Status: DISCONTINUED | OUTPATIENT
Start: 2019-01-15 | End: 2019-01-15 | Stop reason: HOSPADM

## 2019-01-15 RX ORDER — ONDANSETRON 2 MG/ML
4 INJECTION INTRAMUSCULAR; INTRAVENOUS AS NEEDED
Status: DISCONTINUED | OUTPATIENT
Start: 2019-01-15 | End: 2019-01-15 | Stop reason: HOSPADM

## 2019-01-15 RX ORDER — LIDOCAINE HYDROCHLORIDE 20 MG/ML
INJECTION, SOLUTION EPIDURAL; INFILTRATION; INTRACAUDAL; PERINEURAL AS NEEDED
Status: DISCONTINUED | OUTPATIENT
Start: 2019-01-15 | End: 2019-01-15 | Stop reason: HOSPADM

## 2019-01-15 RX ORDER — MIDAZOLAM HYDROCHLORIDE 1 MG/ML
0.5 INJECTION, SOLUTION INTRAMUSCULAR; INTRAVENOUS
Status: DISCONTINUED | OUTPATIENT
Start: 2019-01-15 | End: 2019-01-15 | Stop reason: HOSPADM

## 2019-01-15 RX ORDER — DEXAMETHASONE SODIUM PHOSPHATE 4 MG/ML
INJECTION, SOLUTION INTRA-ARTICULAR; INTRALESIONAL; INTRAMUSCULAR; INTRAVENOUS; SOFT TISSUE AS NEEDED
Status: DISCONTINUED | OUTPATIENT
Start: 2019-01-15 | End: 2019-01-15 | Stop reason: HOSPADM

## 2019-01-15 RX ORDER — OXYCODONE AND ACETAMINOPHEN 5; 325 MG/1; MG/1
1 TABLET ORAL AS NEEDED
Status: DISCONTINUED | OUTPATIENT
Start: 2019-01-15 | End: 2019-01-15 | Stop reason: HOSPADM

## 2019-01-15 RX ORDER — SODIUM CHLORIDE 9 MG/ML
INJECTION, SOLUTION INTRAVENOUS
Status: DISCONTINUED | OUTPATIENT
Start: 2019-01-15 | End: 2019-01-15 | Stop reason: HOSPADM

## 2019-01-15 RX ORDER — PHENYLEPHRINE HCL IN 0.9% NACL 0.4MG/10ML
SYRINGE (ML) INTRAVENOUS AS NEEDED
Status: DISCONTINUED | OUTPATIENT
Start: 2019-01-15 | End: 2019-01-15 | Stop reason: HOSPADM

## 2019-01-15 RX ORDER — HYDROCODONE BITARTRATE AND ACETAMINOPHEN 5; 325 MG/1; MG/1
1 TABLET ORAL
Qty: 20 TAB | Refills: 0 | Status: ON HOLD | OUTPATIENT
Start: 2019-01-15 | End: 2019-04-24 | Stop reason: SDUPTHER

## 2019-01-15 RX ORDER — SODIUM CHLORIDE 9 MG/ML
25 INJECTION, SOLUTION INTRAVENOUS CONTINUOUS
Status: DISCONTINUED | OUTPATIENT
Start: 2019-01-15 | End: 2019-01-15 | Stop reason: HOSPADM

## 2019-01-15 RX ORDER — HYDROMORPHONE HYDROCHLORIDE 2 MG/ML
INJECTION, SOLUTION INTRAMUSCULAR; INTRAVENOUS; SUBCUTANEOUS AS NEEDED
Status: DISCONTINUED | OUTPATIENT
Start: 2019-01-15 | End: 2019-01-15 | Stop reason: HOSPADM

## 2019-01-15 RX ORDER — DIPHENHYDRAMINE HYDROCHLORIDE 50 MG/ML
12.5 INJECTION, SOLUTION INTRAMUSCULAR; INTRAVENOUS AS NEEDED
Status: DISCONTINUED | OUTPATIENT
Start: 2019-01-15 | End: 2019-01-15 | Stop reason: HOSPADM

## 2019-01-15 RX ORDER — HYDROMORPHONE HYDROCHLORIDE 1 MG/ML
0.2 INJECTION, SOLUTION INTRAMUSCULAR; INTRAVENOUS; SUBCUTANEOUS
Status: DISCONTINUED | OUTPATIENT
Start: 2019-01-15 | End: 2019-01-15 | Stop reason: HOSPADM

## 2019-01-15 RX ADMIN — SUCCINYLCHOLINE CHLORIDE 120 MG: 20 INJECTION INTRAMUSCULAR; INTRAVENOUS at 07:40

## 2019-01-15 RX ADMIN — DEXAMETHASONE SODIUM PHOSPHATE 4 MG: 4 INJECTION, SOLUTION INTRA-ARTICULAR; INTRALESIONAL; INTRAMUSCULAR; INTRAVENOUS; SOFT TISSUE at 09:53

## 2019-01-15 RX ADMIN — ROCURONIUM BROMIDE 5 MG: 10 INJECTION, SOLUTION INTRAVENOUS at 07:40

## 2019-01-15 RX ADMIN — MIDAZOLAM HYDROCHLORIDE 2 MG: 1 INJECTION, SOLUTION INTRAMUSCULAR; INTRAVENOUS at 07:24

## 2019-01-15 RX ADMIN — ONDANSETRON 4 MG: 2 INJECTION INTRAMUSCULAR; INTRAVENOUS at 09:53

## 2019-01-15 RX ADMIN — FENTANYL CITRATE 100 MCG: 50 INJECTION, SOLUTION INTRAMUSCULAR; INTRAVENOUS at 07:40

## 2019-01-15 RX ADMIN — LIDOCAINE HYDROCHLORIDE 100 MG: 20 INJECTION, SOLUTION EPIDURAL; INFILTRATION; INTRACAUDAL; PERINEURAL at 07:40

## 2019-01-15 RX ADMIN — SODIUM CHLORIDE: 9 INJECTION, SOLUTION INTRAVENOUS at 07:19

## 2019-01-15 RX ADMIN — SODIUM CHLORIDE 25 ML/HR: 900 INJECTION, SOLUTION INTRAVENOUS at 06:50

## 2019-01-15 RX ADMIN — PROPOFOL 50 MG: 10 INJECTION, EMULSION INTRAVENOUS at 08:07

## 2019-01-15 RX ADMIN — PROPOFOL 150 MG: 10 INJECTION, EMULSION INTRAVENOUS at 07:40

## 2019-01-15 RX ADMIN — PROPOFOL 50 MG: 10 INJECTION, EMULSION INTRAVENOUS at 09:49

## 2019-01-15 RX ADMIN — Medication 120 MCG: at 08:15

## 2019-01-15 RX ADMIN — PROPOFOL 50 MG: 10 INJECTION, EMULSION INTRAVENOUS at 09:32

## 2019-01-15 RX ADMIN — HYDROMORPHONE HYDROCHLORIDE 0.5 MG: 2 INJECTION, SOLUTION INTRAMUSCULAR; INTRAVENOUS; SUBCUTANEOUS at 09:34

## 2019-01-15 NOTE — ANESTHESIA POSTPROCEDURE EVALUATION
Procedure(s): CREATION ARTERIO VENOUS FISTULA LEFT ARM. Anesthesia Post Evaluation Patient location during evaluation: PACU Patient participation: complete - patient participated Level of consciousness: awake and alert Pain management: adequate Airway patency: patent Anesthetic complications: no 
Cardiovascular status: acceptable Respiratory status: acceptable Hydration status: acceptable Comments: Seen and ready for discharge Post anesthesia nausea and vomiting:  none Visit Vitals /83 Pulse 69 Temp 36.8 °C (98.3 °F) Resp 17 Ht 5' 5\" (1.651 m) Wt 72.7 kg (160 lb 4.4 oz) SpO2 96% BMI 26.67 kg/m²

## 2019-01-15 NOTE — PROGRESS NOTES
Patient discharged to home. Iv removed. Discharge instructions, script, and medication education done with patient and family.

## 2019-01-15 NOTE — H&P
Surgery History and Physical 
 
Subjective:  
  
Helen Banks is a 60 y.o. male referred by Dr. Mine Diego regarding dialysis access. The patient initially had thought he would want peritoneal dialysis but now says he wants to plan for hemodialysis. He is not currently on dialysis but is reported to have creatinine clearance less than 10 cc's per minute.  
  
The patient presently denies shortness of breath and does have reasonable food intake.  
  
The patient is right handed. He has no history of pacemaker or defibrillator. He has no history of axillary node surgery.  
  
Past medical history includes nephrectomy, hypercholesterolemia, hypertension, sleep apnea, stroke . The patient is a former smoker. He does not use alcohol.  
  
 
Past Medical History:  
Diagnosis Date  Chronic kidney disease   
 dialysis/ [address] m-w-f  Hypercholesterolemia  Hypertension  Sleep apnea   
 cpap  Stroke ([facility] [address] TIA, stroke 2012 Past Surgical History:  
Procedure Laterality Date  HX HEENT    
 tonsillectomy in   HX NEPHRECTOMY  1980  
 kidney removed  HX POLYPECTOMY    
   HX VASCULAR ACCESS Left   
 neck for dialysis Family History Problem Relation Age of Onset  Heart Disease Mother  No Known Problems Father  Stroke Brother Social History Tobacco Use  Smoking status: Former Smoker Packs/day: 1.50 Years: 40.00 Pack years: 60.00 Last attempt to quit: 10/10/2012 Years since quittin.2  Smokeless tobacco: Never Used Substance Use Topics  Alcohol use: No  
  
Prior to Admission medications Medication Sig Start Date End Date Taking? Authorizing Provider  
amLODIPine (NORVASC) 10 mg tablet Take 10 mg by mouth daily. Yes Provider, Historical  
calcium acetate (PHOSLO) 667 mg cap Take  by mouth three (3) times daily (with meals).    Yes Provider, Historical  
 cloNIDine HCl (CATAPRES) 0.3 mg tablet Take 1 Tab by mouth two (2) times a day. 18  Yes Skiff, Charleen Earthly, [place]  
cholecalciferol, VITAMIN D3, (VITAMIN D3) 5,000 unit tab tablet Take  by mouth daily. Yes Provider, Historical  
metoprolol succinate (TOPROL XL) 100 mg tablet Take 50 mg by mouth daily. Yes Provider, Historical  
ubidecarenone (CO Q-10 PO) Take  by mouth daily. Provider, Historical  
clopidogrel (PLAVIX) 75 mg tab TAKE 1 TABLET BY MOUTH EVERY DAY    Provider, Historical  
ferrous sulfate 325 mg (65 mg iron) tablet Take 1 Tab by mouth Daily (before breakfast). 18   Skiff, Charleen Earthly, NP  
vitamin e (AQUA GEMS) 200 unit capsule Take  by mouth daily. Provider, Historical  
aspirin 81 mg chewable tablet Take 81 mg by mouth daily. Provider, Historical  
  
Allergies Allergen Reactions  Chlorhexidine Itching Review of Systems: 
Pertinent items are noted in the History of Present Illness. Objective:  
 
  
Patient Vitals for the past 8 hrs: 
 BP Temp Pulse Resp SpO2 Height Weight 01/15/19 0656 (!) 153/95 98.2 °F (36.8 °C) 67 14 100 % 5' 5\" (1.651 m) 72.7 kg (160 lb 4.4 oz) Temp (24hrs), Av.2 °F (36.8 °C), Min:98.2 °F (36.8 °C), Max:98.2 °F (36.8 °C) PHYSICAL EXAM 
 
WD man,  NAD HEAD/NECK: No jaundice, mass or thyromegaly. LUNGS: Clear bilaterally without wheeze, rhonchi or rales. [place] Dials HEART: Regular without murmur, gallop or rub. Abdomen:  Soft, non tender, no mass or hernia noted. NEURO: Patient is alert and oriented x 3 and moves all extremities equally. Facial movement is symmetrical. Speech was normal.  
EXT: Palpable left radial and ulnar pulses Assessment:  
 
CKD 5 Plan:  
 
Creation of AV fistula left arm Signed By: Janelle Vogel MD   
 January 15, 2019

## 2019-01-15 NOTE — PERIOP NOTES
Handoff Report from Operating Room to PACU Report received from Jose Peralta RN and Rene Coe CRNA regarding [employer]. Surgeon(s): 
Kristian Oro MD  And Procedure(s) (LRB): 
CREATION ARTERIO VENOUS FISTULA LEFT ARM (Left)  confirmed  
with dressings discussed. Anesthesia type, drugs, patient history, complications, estimated blood loss, vital signs, intake and output, and last pain medication, lines and temperature were reviewed.

## 2019-01-15 NOTE — DISCHARGE INSTRUCTIONS
Discharge Instructions Following AV Fistula Surgery        Keep dressing in place for two days. Keep incision dry for two days. No lifting over fifteen pounds with operated arm for two weeks. See Dr. Chantel Morales in the office in two weeks - call for appointment - [phone]. Take pain medications as prescribed as needed, or use Tylenol. Do not restart clopidogrel (Plavix) or Aspirin until Thursday 1/17/2019. Do not drive while taking narcotic pain medication. For any problems, call Dr. Chantel Morales at [phone] or [phone] (after hours)      VERONIKA Porter MD

## 2019-01-15 NOTE — ANESTHESIA PREPROCEDURE EVALUATION
Anesthetic History No history of anesthetic complications Review of Systems / Medical History Patient summary reviewed, nursing notes reviewed and pertinent labs reviewed Pulmonary Sleep apnea: CPAP Comments: Smoking Status: Former Smoker - 60 pack years Quit Smoking: 10/10/12 Neuro/Psych CVA TIA Cardiovascular Hypertension Hyperlipidemia Exercise tolerance: >4 METS 
  
GI/Hepatic/Renal 
  
 
 
Renal disease: ESRD Endo/Other Obesity Other Findings Physical Exam 
 
Airway Mallampati: III 
TM Distance: 4 - 6 cm Neck ROM: normal range of motion Mouth opening: Normal 
 
 Cardiovascular Regular rate and rhythm,  S1 and S2 normal,  no murmur, click, rub, or gallop Rhythm: regular Rate: normal 
 
 
 
 Dental 
No notable dental hx Pulmonary Breath sounds clear to auscultation Abdominal 
GI exam deferred Other Findings Anesthetic Plan ASA: 3 Anesthesia type: general 
 
 
 
 
Induction: Intravenous Anesthetic plan and risks discussed with: Patient

## 2019-01-15 NOTE — OP NOTES
Operative Report    CPT 41005      Creation of AV Fistula Left Arm        Patient:  Renato Gama    Nephrologist - Dr Erik Alexis    Date of Surgery:  1/15/2019    Preoperative Diagnosis - ESRD    Postoperative Diagnosis - Same    Anesthesia - General    Surgeon - Lyly Rosenberg    Surg Asst-1: Jacob Das RN    Procedure - Creation of AV fistula left Arm    Operative Summary -     The patient was taken to the operating room and placed on the operating table in the supine position. The patient's left arm was  prepared and steriley draped. The location of the veins had been reconfirmed by ultrasound prior to surgery. An incision was made at the antecubital.  The deep tributary  vein was exposed and freed for about 3 cm. It was around 3.5 mm in diameter. The distal brachial artery and origins of radial and ulnar arteries were exposed  and surrounded by vessel loops. The vein was marked for orientation. The vein was ligated distally and divided and was gently distended with heparinized lactated Ringer's solution. The  Arteries were controlled proximally and distally with Johnnye Harden bulldog clamps. A small longitudinal arteriotomy was made at the lower end of the brachial artery extending onto the radial.  The end of the vein was spatulated and brought over to the artery. Using 6-O prolene an end vein to side artery anastomosis was made. Prior to completion, the artery was flushed proximally and distally. After completion, with initiation of flow, there was a thrill at the AV anastomosis and a palpable pulse in the radial artery at the wrist.  The wound was irrigated with antibiotic solution and closed with 3-0 chromic in the subcutaneous layer and with 4-0 nylon at the skin. A gauze dressing was applied. The patient tolerated the procedure well and was taken to the PACU in stable condition.      Specimen - none     Drain - none    Complications - none    Estimated Blood Loss - 40 ml    Implant - none    LIAM Perry MD

## 2019-01-15 NOTE — BRIEF OP NOTE
BRIEF OPERATIVE NOTE Date of Procedure: 1/15/2019 Preoperative Diagnosis: ESRD Postoperative Diagnosis: ESRD Procedure(s): CREATION ARTERIO VENOUS FISTULA LEFT ARM Surgeon(s) and Role: Samuel Cerda MD - Primary Surgical Assistant: staff Surgical Staff: 
Circ-1: Taryn Cisneros RN Scrub Tech-1: Henny Cabrera Surg Asst-1: Mena Araujo RN Event Time In Time Out Incision Start 2645 Incision Close 5890 Anesthesia: General  
Estimated Blood Loss: 40 ml Specimens: * No specimens in log * Findings: Palpable thrill in AV fistula and palpable pulse in radial artery at the wrist post op. Complications: none Implants: * No implants in log *

## 2019-01-18 RX ORDER — CLOPIDOGREL BISULFATE 75 MG/1
TABLET ORAL
Qty: 90 TAB | Refills: 1 | Status: SHIPPED | OUTPATIENT
Start: 2019-01-18

## 2019-01-25 ENCOUNTER — PATIENT MESSAGE (OUTPATIENT)
Dept: INTERNAL MEDICINE CLINIC | Facility: CLINIC | Age: 59
End: 2019-01-25

## 2019-01-25 NOTE — TELEPHONE ENCOUNTER
Called and spoke with pt explained that Dr. Roscoe Weiss would like for pt to see neurologist to decide if he should continue on plavix. A [employer] message was also sent to pt.  Hannah Bowling LPN

## 2019-01-25 NOTE — TELEPHONE ENCOUNTER
Regarding: FW:MyChart After Visit Follow-Up Message. Contact: [phone]   Tara please let him know I am referring him to Dr. Anna Alicea at [facility]. He needs to see neurologist to decide if he still needs the plavix since he states he was started on it due to a stroke. Please help him to schedule an appointment. I would like him to be evaluated as soon as possible to help figure this out.  ----- Message -----  From: Jorge Hayden LPN  Sent: 9/69/4408   8:46 AM  To: Erline Libman, MD  Subject: FW:MyChart After Visit Follow-Up Message. ----- Message -----  From: Marybel Leach  Sent: 1/17/2019   4:49 PM  To: [facility]  Subject: RE:MyChart After Visit Follow-Up Message. ----- Message from [address] [place] [address] [address], Generic sent at 1/17/2019  4:49 PM EST -----    Mark Doctor Jennifer Coburn,  Thank you so much for taking care of me. I have question about my medication, clopidogrel 75 mg Tab  Commonly known as: PLAVIX Learn more  This medication cannot be refilled at this time  Take 1 tablet by mouth every day (copying from the list), is it necessary for me to take? I did not take this medication long time (was withdrawn by ? not remember) then  I saw back in the list, I tried to refill but pharmacy had no information so they did not allow me to refill. And I just let you know that I got my AV fistula done on 01/15/19. If you would like to withdraw it for me, I would be happy; but if you want me to resume it that would be today I can take after AV fistula . Please let me know. Respectfully,  HANNY Banks  ----- Message -----  From: Erline Libman, MD  Sent: 1/3/2019 12:00 AM EST  To: [place] Banks  Subject: MyChart After Visit Follow-Up Message. Mark  Maurilioarmin,    Thank you for your recent visit to [facility]. Your health is important to us and we are privileged to be your healthcare provider and partner.   If you have follow-up questions regarding your treatment plan from todays visit, please contact us through the [employer] Patient Portal by replying to this message. In the near future, you may receive a survey about your experience with us. We hope you will take the time to let us know how we did so we can continue to improve the care you receive.       Thank you,    [facility]

## 2019-01-28 ENCOUNTER — OFFICE VISIT (OUTPATIENT)
Dept: INTERNAL MEDICINE CLINIC | Facility: CLINIC | Age: 59
End: 2019-01-28

## 2019-01-28 VITALS
HEART RATE: 69 BPM | BODY MASS INDEX: 26.49 KG/M2 | DIASTOLIC BLOOD PRESSURE: 75 MMHG | HEIGHT: 65 IN | TEMPERATURE: 98.4 F | SYSTOLIC BLOOD PRESSURE: 120 MMHG | RESPIRATION RATE: 18 BRPM | WEIGHT: 159 LBS

## 2019-01-28 DIAGNOSIS — N18.6 ESRF (END STAGE RENAL FAILURE) (HCC): ICD-10-CM

## 2019-01-28 DIAGNOSIS — D50.9 IRON DEFICIENCY ANEMIA, UNSPECIFIED IRON DEFICIENCY ANEMIA TYPE: ICD-10-CM

## 2019-01-28 DIAGNOSIS — I77.0 A-V FISTULA (HCC): Primary | ICD-10-CM

## 2019-01-28 DIAGNOSIS — E78.5 HYPERLIPIDEMIA, UNSPECIFIED HYPERLIPIDEMIA TYPE: ICD-10-CM

## 2019-01-28 DIAGNOSIS — I10 ESSENTIAL HYPERTENSION: ICD-10-CM

## 2019-01-28 DIAGNOSIS — R73.03 PREDIABETES: ICD-10-CM

## 2019-01-28 RX ORDER — ROSUVASTATIN CALCIUM 10 MG/1
10 TABLET, COATED ORAL
Qty: 90 TAB | Refills: 1 | Status: SHIPPED | OUTPATIENT
Start: 2019-01-28 | End: 2022-10-24 | Stop reason: SDUPTHER

## 2019-01-28 NOTE — PROGRESS NOTES
Subjective:      Blanca Schmidt is a 60 y.o. male who presents today for No chief complaint on file. He has a past med hx of BRISSA, ESRD, Hyperlipidemia, Hx CVA, iron deficiency anemia, Gout, Right nephrectomy      Currently undergoing dialysis, surgery is pending for AV fistula. He will see Dr. Gerald Solomon in vascular. He has dialysis 3 times per week             Patient Active Problem List    Diagnosis Date Noted    Chest pain 10/10/2018    Preoperative clearance 10/10/2018    CKD (chronic kidney disease) stage 5, GFR less than 15 ml/min ([address] 10/02/2018    ESRF (end stage renal failure) ([facility])--for creation of AV fistula 09/26/2018    Iron deficiency anemia 04/27/2018    Acute gout 04/27/2018    Overweight (BMI 25.0-29.9) 03/21/2018    Essential hypertension 11/17/2017    History of nephrectomy 11/17/2017    Hyperlipidemia LDL goal <100 11/17/2017    History of stroke 11/17/2017    Sleep apnea 11/17/2017     Current Outpatient Medications   Medication Sig Dispense Refill    clopidogrel (PLAVIX) 75 mg tab TAKE 1 TABLET BY MOUTH EVERY DAY 90 Tab 1    HYDROcodone-acetaminophen (NORCO) 5-325 mg per tablet Take 1 Tab by mouth every four (4) hours as needed for Pain. Max Daily Amount: 6 Tabs. 20 Tab 0    amLODIPine (NORVASC) 10 mg tablet Take 10 mg by mouth daily.  calcium acetate (PHOSLO) 667 mg cap Take  by mouth three (3) times daily (with meals).  cloNIDine HCl (CATAPRES) 0.3 mg tablet Take 1 Tab by mouth two (2) times a day. 60 Tab 2    ubidecarenone (CO Q-10 PO) Take  by mouth daily.  ferrous sulfate 325 mg (65 mg iron) tablet Take 1 Tab by mouth Daily (before breakfast). 90 Tab 0    vitamin e (AQUA GEMS) 200 unit capsule Take  by mouth daily.  cholecalciferol, VITAMIN D3, (VITAMIN D3) 5,000 unit tab tablet Take  by mouth daily.  metoprolol succinate (TOPROL XL) 100 mg tablet Take 50 mg by mouth daily.  aspirin 81 mg chewable tablet Take 81 mg by mouth daily. Allergies   Allergen Reactions    Chlorhexidine Itching     Past Medical History:   Diagnosis Date    Chronic kidney disease     dialysis/  [facility] m-w-f    Hypercholesterolemia     Hypertension     Sleep apnea     cpap    Stroke [facility])     TIA, stroke ,      Past Surgical History:   Procedure Laterality Date    HX HEENT      tonsillectomy in     [address]    kidney removed    HX POLYPECTOMY          HX VASCULAR ACCESS Left     neck for dialysis     Family History   Problem Relation Age of Onset    Heart Disease Mother     No Known Problems Father     Stroke Brother      Social History     Tobacco Use    Smoking status: Former Smoker     Packs/day: 1.50     Years: 40.00     Pack years: 60.00     Last attempt to quit: 10/10/2012     Years since quittin.3    Smokeless tobacco: Never Used   Substance Use Topics    Alcohol use: No        Review of Systems    A comprehensive review of systems was negative except for that written in the HPI. Objective: There were no vitals taken for this visit. General:  Alert, cooperative, no distress, appears stated age. Head:  Normocephalic, without obvious abnormality, atraumatic. Eyes:  Conjunctivae/corneas clear. PERRL, EOMs intact. Fundi benign. Ears:  Normal TMs and external ear canals both ears. Nose: Nares normal. Septum midline. Mucosa normal. No drainage or sinus tenderness. Throat: Lips, mucosa, and tongue normal. Teeth and gums normal.   Neck: Supple, symmetrical, trachea midline, no adenopathy, thyroid: no enlargement/tenderness/nodules, no carotid bruit and no JVD. Back:   Symmetric, no curvature. ROM normal. No CVA tenderness. Lungs:   Clear to auscultation bilaterally. Chest wall:  No tenderness or deformity. Heart:  Regular rate and rhythm, S1, S2 normal, no murmur, click, rub or gallop. Assessment/Plan:       ICD-10-CM ICD-9-CM    1.  A-V fistula ([facility] I77.0 447.0 Follow with vascular   2. ESRF (end stage renal failure) (HCC)--for creation of AV fistula N18.6 585.6 dialysis   3. Hyperlipidemia, unspecified hyperlipidemia type E78.5 272.4 Restart statin   4. Iron deficiency anemia, unspecified iron deficiency anemia type D50.9 280.9    5. Prediabetes R73.03 790.29 Monitor diet   6. Essential hypertension I10 401.9 Continue current medication       Follow-up Disposition: Not on File   Advised him to call back or return to office if symptoms worsen/change/persist.  Discussed expected course/resolution/complications of diagnosis in detail with patient. Medication risks/benefits/costs/interactions/alternatives discussed with patient. He was given an after visit summary which includes diagnoses, current medications, & vitals. He expressed understanding with the diagnosis and plan.

## 2019-01-28 NOTE — PROGRESS NOTES
No chief complaint on file. 1. Have you been to the ER, urgent care clinic since your last visit? Hospitalized since your last visit? No    2. Have you seen or consulted any other health care providers outside of the [address] [place] [address] since your last visit? Include any pap smears or colon screening.  No

## 2019-01-31 ENCOUNTER — OFFICE VISIT (OUTPATIENT)
Dept: SURGERY | Age: 59
End: 2019-01-31

## 2019-01-31 VITALS
RESPIRATION RATE: 20 BRPM | HEART RATE: 79 BPM | OXYGEN SATURATION: 100 % | WEIGHT: 161 LBS | HEIGHT: 65 IN | DIASTOLIC BLOOD PRESSURE: 83 MMHG | TEMPERATURE: 98.1 F | BODY MASS INDEX: 26.82 KG/M2 | SYSTOLIC BLOOD PRESSURE: 132 MMHG

## 2019-01-31 DIAGNOSIS — Z09 POSTOPERATIVE EXAMINATION: Primary | ICD-10-CM

## 2019-01-31 NOTE — PROGRESS NOTES
Surgery    The patient is S/P creation of arteriovenous fistula left arm on 1/14/2019. The patient has no complaints. On examination, there is a palpble thrill in the AV fistula. There is a palpable radial pulse at the wrist.    The incision is doing well. Sutures were removed and steri strips applied. The patient will see me in the office in 1 month. I will assess the outflow vein at that time with ultrasound. Once the outflow vein has dilated sufficiently, it will be transposed.     Tk Lane MD      CC:  Dr Hiral Marshall

## 2019-01-31 NOTE — PROGRESS NOTES
Chief Complaint   Patient presents with    Surgical Follow-up     Creation of AV fistula left Arm 1/15/19     1. Have you been to the ER, urgent care clinic since your last visit? Hospitalized since your last visit? No    2. Have you seen or consulted any other health care providers outside of the [address] [place] [address] since your last visit? Include any pap smears or colon screening.  No

## 2019-02-07 ENCOUNTER — OFFICE VISIT (OUTPATIENT)
Dept: NEUROLOGY | Age: 59
End: 2019-02-07

## 2019-02-07 VITALS
HEIGHT: 65 IN | SYSTOLIC BLOOD PRESSURE: 116 MMHG | RESPIRATION RATE: 16 BRPM | HEART RATE: 65 BPM | BODY MASS INDEX: 26.99 KG/M2 | DIASTOLIC BLOOD PRESSURE: 70 MMHG | WEIGHT: 162 LBS | OXYGEN SATURATION: 98 % | TEMPERATURE: 97.6 F

## 2019-02-07 DIAGNOSIS — R20.2 PARESTHESIA: ICD-10-CM

## 2019-02-07 DIAGNOSIS — I63.30 CEREBROVASCULAR ACCIDENT (CVA) DUE TO THROMBOSIS OF CEREBRAL ARTERY (HCC): Primary | ICD-10-CM

## 2019-02-07 DIAGNOSIS — R26.9 GAIT DISORDER: ICD-10-CM

## 2019-02-07 RX ORDER — FOLIC ACID 1 MG/1
1 TABLET ORAL DAILY
Qty: 30 TAB | Refills: 1 | Status: SHIPPED | OUTPATIENT
Start: 2019-02-07

## 2019-02-07 NOTE — PROGRESS NOTES
Neurology Consult Note HISTORY PROVIDED BY: patient Chief Complaint:  
Chief Complaint Patient presents with  Stroke  New Patient Subjective:  
 Lindsay Hirsch is a 60 y.o. right handed male who presents in consultation for stroke. This is a 62-year-old right-handed  male with history of chronic kidney disease status post nephrectomy, dialysis dependent, dyslipidemia, hypertension, sleep apnea, CVA, who was referred to the clinic to evaluate and continue management of cerebrovascular accident. Patient says he had stroke in 2007 with he became weak on one side, and another stroke in 2012, patient was previously in [place] where he was being managed, he relocated to [facility] in 2017 and currently trying to establish with neurologist. 
He says he tries to be compliant with his medications, however, recently patient states he has had episodes of numbness and tingling sensation on one side of the body which will come and go. He also says he occasionally experiences confusion and forgetfulness. The confusion and fatigue mostly occur after patient has had dialysis or during the dialysis. At this time I told patient that he might be having some TIAs due to fluctuation in blood pressure. Since patient has moved down to [place], no workup or brain imaging has been done to evaluate the patient's stroke status. Patient has been having difficulty ambulating, gait is unsteady. I will obtain a head CT of this patient to have a baseline, refer patient to physical therapy and obtain basic blood work for stroke. Review of Systems - General ROS: positive for  - fatigue, malaise, night sweats and sleep disturbance Psychological ROS: positive for - anxiety, concentration difficulties, memory difficulties, mood swings and sleep disturbances Ophthalmic ROS: positive for - decreased vision and double vision ENT ROS: positive for - headaches, tinnitus, vertigo and visual changes Allergy and Immunology ROS: negative Hematological and Lymphatic ROS: negative Endocrine ROS: negative Respiratory ROS: no cough, shortness of breath, or wheezing Cardiovascular ROS: no chest pain or dyspnea on exertion Gastrointestinal ROS: no abdominal pain, change in bowel habits, or black or bloody stools Genito-Urinary ROS: no dysuria, trouble voiding, or hematuria Musculoskeletal ROS: positive for - gait disturbance, joint pain, joint stiffness, muscle pain and muscular weakness Neurological ROS: positive for - dizziness, gait disturbance, headaches, impaired coordination/balance, numbness/tingling, tremors, visual changes and weakness Dermatological ROS: negative Past Medical History:  
Diagnosis Date  Chronic kidney disease   
 dialysis/ [address]   Fatigue  Hypercholesterolemia  Hypertension  Sleep apnea   
 cpap  Snoring  Stroke ([facility] TIA, stroke ,  Past Surgical History:  
Procedure Laterality Date  HX HEENT    
 tonsillectomy in   HX NEPHRECTOMY  1980  
 kidney removed  HX POLYPECTOMY    
   HX VASCULAR ACCESS Left   
 neck for dialysis Social History Socioeconomic History  Marital status:  Spouse name: Not on file  Number of children: Not on file  Years of education: Not on file  Highest education level: Not on file Social Needs  Financial resource strain: Not on file  Food insecurity - worry: Not on file  Food insecurity - inability: Not on file  Transportation needs - medical: Not on file  Transportation needs - non-medical: Not on file Occupational History  Not on file Tobacco Use  Smoking status: Former Smoker Packs/day: 1.50 Years: 40.00 Pack years: 60.00 Last attempt to quit: 10/10/2012 Years since quittin.3  Smokeless tobacco: Never Used Substance and Sexual Activity  Alcohol use: No  
 Drug use: No  
 Sexual activity: Yes  
  Partners: Female Other Topics Concern  Not on file Social History Narrative  Not on file Family History Problem Relation Age of Onset  Heart Disease Mother  No Known Problems Father  Stroke Brother Objective: ROS As per HPI Allergies Allergen Reactions  Chlorhexidine Itching Meds: Outpatient Medications Prior to Visit Medication Sig Dispense Refill  rosuvastatin (CRESTOR) 10 mg tablet Take 1 Tab by mouth nightly. 90 Tab 1  clopidogrel (PLAVIX) 75 mg tab TAKE 1 TABLET BY MOUTH EVERY DAY 90 Tab 1  
 HYDROcodone-acetaminophen (NORCO) 5-325 mg per tablet Take 1 Tab by mouth every four (4) hours as needed for Pain. Max Daily Amount: 6 Tabs. 20 Tab 0  
 amLODIPine (NORVASC) 10 mg tablet Take 10 mg by mouth daily.  calcium acetate (PHOSLO) 667 mg cap Take  by mouth three (3) times daily (with meals).  cloNIDine HCl (CATAPRES) 0.3 mg tablet Take 1 Tab by mouth two (2) times a day. 60 Tab 2  
 ubidecarenone (CO Q-10 PO) Take  by mouth daily.  ferrous sulfate 325 mg (65 mg iron) tablet Take 1 Tab by mouth Daily (before breakfast). 90 Tab 0  
 vitamin e (AQUA GEMS) 200 unit capsule Take  by mouth daily.  cholecalciferol, VITAMIN D3, (VITAMIN D3) 5,000 unit tab tablet Take  by mouth daily.  metoprolol succinate (TOPROL XL) 100 mg tablet Take 50 mg by mouth daily.  aspirin 81 mg chewable tablet Take 81 mg by mouth daily. No facility-administered medications prior to visit. Imaging: MRI Results (most recent): No results found for this or any previous visit. CT Results (most recent): No results found for this or any previous visit. Reviewed records in [facility] and [place] tab today Lab Review Results for orders placed or performed in visit on 02/07/19 VITAMIN B12 Result Value Ref Range Vitamin B12 582 232 - 1,245 pg/mL SED RATE (ESR) Result Value Ref Range Sed rate (ESR) 25 0 - 30 mm/hr VITAMIN D, 1, 25 DIHYDROXY Result Value Ref Range Calcitriol (Vit D 1, 25 di-OH) 23.2 19.9 - 79.3 pg/mL LIPID PANEL Result Value Ref Range Cholesterol, total 149 100 - 199 mg/dL Triglyceride 178 (H) 0 - 149 mg/dL HDL Cholesterol 34 (L) >39 mg/dL VLDL, calculated 36 5 - 40 mg/dL LDL, calculated 79 0 - 99 mg/dL HOMOCYSTEINE, PLASMA Result Value Ref Range Homocysteine, plasma 13.2 0.0 - 15.0 umol/L  
CRP, HIGH SENSITIVITY Result Value Ref Range C-Reactive Protein, Cardiac 4.39 (H) 0.00 - 3.00 mg/L Exam: 
Visit Vitals /70 (BP 1 Location: Right arm, BP Patient Position: Sitting) Pulse 65 Temp 97.6 °F (36.4 °C) (Temporal) Resp 16 Ht 5' 5\" (1.651 m) Wt 162 lb (73.5 kg) SpO2 98% BMI 26.96 kg/m² General:  Alert, cooperative, no distress. Head:  Normocephalic, without obvious abnormality, atraumatic. Respiratory: 
Heart:   Non labored breathing Regular rate and rhythm, no murmurs Neck:   2+ carotids, no bruits Extremities: Warm, no cyanosis or edema. Pulses: 2+ radial pulses. Neurologic:  MS: Alert and oriented x 4, speech intact. Language intact, able to name, repeat, and follow all commands. Attention and fund of knowledge appropriate. Recent and remote memory intact. Cranial Nerves: 
II: visual fields Full to confrontation II: pupils Equal, round, reactive to light II: optic disc No papilledema III,VII: ptosis none III,IV,VI: extraocular muscles  EOMI, no nystagmus or diplopia V: facial light touch sensation  normal  
VII: facial muscle function   symmetric VIII: hearing intact IX: soft palate elevation  normal  
XI: trapezius strength  5/5 XI: sternocleidomastoid strength 5/5 XII: tongue  Midline Motor: normal bulk and slightly increase tone, no tremor Strength: 3+/5 left upper and lower extremity,  PD left Sensory: Dysesthesia to LT, PP, temperature and vibration Coordination: FTN and HTS abnormal, JODEE i abnormal,Romberg positive Gait: Hemiplegia gait, ambulates with cane, unable to heel, toe, and tandem walk Reflexes: 3+ symmetric, toes withdrawn Assessment/Plan ICD-10-CM ICD-9-CM 1. Cerebrovascular accident (CVA) due to thrombosis of cerebral artery (HCC) I63.30 434.01 REFERRAL TO PHYSICAL THERAPY  
   CT HEAD WO CONT  
   SED RATE, AUTOMATED  
   VITAMIN B12  
2. Gait disorder R26.9 781.2 REFERRAL TO PHYSICAL THERAPY  
   CT HEAD WO CONT  
   SED RATE, AUTOMATED  
   VITAMIN B12  
   SED RATE (ESR) VITAMIN D, 1, 25 DIHYDROXY  
   LIPID PANEL  
   HOMOCYSTEINE, PLASMA  
   CRP, HIGH SENSITIVITY 3. Paresthesia R20.2 782.0 REFERRAL TO PHYSICAL THERAPY  
   SED RATE, AUTOMATED  
   VITAMIN B12  
   SED RATE (ESR) VITAMIN D, 1, 25 DIHYDROXY  
   LIPID PANEL  
   HOMOCYSTEINE, PLASMA  
   CRP, HIGH SENSITIVITY Plan: 
Folic acid 1 mg p.o. daily Plavix 75 mg p.o. daily CT scan of the head without contrast 
EEG blood for autoimmune workup, C-reactive protein, homocystine, CK, ESR, lipid profile, vitamin B12, vitamin D. Referred to physical therapy. Follow-up Disposition: 
Return in about 6 weeks (around 3/21/2019). Signed: 
Laurie Peñaloza MD 
2/7/2019

## 2019-02-07 NOTE — PATIENT INSTRUCTIONS
ALL TEST RESULTS WILL BE DISCUSSED AT THE NEXT APPOINTMENT. CT Scan of the Head: About This Test 
What is it? A CT (computed tomography) scan uses X-rays to make detailed pictures of your body and the structures inside your body. A CT scan of the head can give your doctor information about your eyes, the bones of your face and nose, your inner ear, and your brain. During the test, you will lie on a table that is attached to the [employer]. The CT scanner is a large doughnut-shaped machine. Why is this test done? A CT scan of the head can help find the cause of symptoms that may mean you have a brain injury or bleeding inside your head. It can also find a tumor and damage caused by a stroke and help find the best treatment for the cause of a stroke. How can you prepare for the test? 
Talk to your doctor about all your health conditions before the test. For example, tell your doctor if: 
· You are or might be pregnant. · You are allergic to any medicines. · You have diabetes. · You take metformin. · You are breastfeeding. · You get nervous in confined spaces. You may need medicine to help you relax. What happens before the test? 
· You may have to take off jewelry, glasses, or hearing aids. Wear comfortable, loose-fitting clothes. · You may have contrast material (dye) put into your arm through a tube called an IV. Contrast material helps doctors see specific organs, blood vessels, and most tumors. What happens during the test? 
· You will lie on a table that is attached to the CT scanner. Straps will hold your head still but your face will not be covered. · The table will slide into the round opening of the scanner. The table will move during the scan. The scanner moves inside the doughnut-shaped casing around your body. · You will be asked to hold still during the scan. You may be asked to hold your breath for short periods.  
· You may be alone in the scanning room, but a technologist will be watching you through a window and talking with you during the test. 
What else should you know about the test? 
· A CT scan does not hurt. · If a dye is used, you may feel a quick sting or pinch when the IV is started. The dye may make you feel warm and flushed and give you a metallic taste in your mouth. Some people feel sick to their stomach or get a headache. · If you breastfeed and are concerned about whether the dye used in this test is safe, talk to your doctor. Most experts believe that very little dye passes into breast milk and even less is passed on to the baby. But if you prefer, you can store some of your breast milk ahead of time and use it for a day or two after the test. 
· There is a small chance of getting cancer from some types of CT scans. The risk is higher in children, young adults, and people who have many radiation tests. If you are concerned about this risk, talk to your doctor about the benefits and risks of a CT scan and confirm that the test is needed. How long does the test take? · The test will take about 30 to 60 minutes. Most of this time is spent getting ready for the scan. The actual test only takes a few minutes. What happens after the test? 
· You will probably be able to go home right away. · You can go back to your usual activities right away. · Drink plenty of fluids for 24 hours after the test if dye was used, unless your doctor tells you not to. When should you call for help? Watch closely for changes in your health, and be sure to contact your doctor if you have any problems. Follow-up care is a key part of your treatment and safety. Be sure to make and go to all appointments, and call your doctor if you are having problems. It's also a good idea to keep a list of the medicines you take. Ask your doctor when you can expect to have your test results. Where can you learn more? Go to http://vitaly-isaac.info/. Enter D978 in the search box to learn more about \"CT Scan of the Head: About This Test.\" Current as of: June 25, 2018 Content Version: 11.9 © 0395-8095 [employer]. Care instructions adapted under license by [employer] (which disclaims liability or warranty for this information). If you have questions about a medical condition or this instruction, always ask your healthcare professional. [place] [address] any warranty or liability for your use of this information.

## 2019-02-08 LAB
1,25(OH)2D3 SERPL-MCNC: 23.2 PG/ML (ref 19.9–79.3)
CHOLEST SERPL-MCNC: 149 MG/DL (ref 100–199)
CRP SERPL HS-MCNC: 4.39 MG/L (ref 0–3)
ERYTHROCYTE [SEDIMENTATION RATE] IN BLOOD BY WESTERGREN METHOD: 25 MM/HR (ref 0–30)
HCYS SERPL-SCNC: 13.2 UMOL/L (ref 0–15)
HDLC SERPL-MCNC: 34 MG/DL
LDLC SERPL CALC-MCNC: 79 MG/DL (ref 0–99)
TRIGL SERPL-MCNC: 178 MG/DL (ref 0–149)
VIT B12 SERPL-MCNC: 582 PG/ML (ref 232–1245)
VLDLC SERPL CALC-MCNC: 36 MG/DL (ref 5–40)

## 2019-02-19 ENCOUNTER — HOSPITAL ENCOUNTER (OUTPATIENT)
Dept: PHYSICAL THERAPY | Age: 59
Discharge: HOME | End: 2019-02-19
Payer: MEDICARE

## 2019-02-19 PROCEDURE — 97161 PT EVAL LOW COMPLEX 20 MIN: CPT | Performed by: PHYSICAL THERAPIST

## 2019-02-19 PROCEDURE — 97110 THERAPEUTIC EXERCISES: CPT | Performed by: PHYSICAL THERAPIST

## 2019-02-19 NOTE — PROGRESS NOTES
[facility]  [address], [address] [facility]    OUTPATIENT physical Therapy    Initial evaluation      NAME: Helen Banks AGE: 60 y.o. GENDER: male  DATE: 2/19/2019  REFERRING PHYSICIAN: Gilberto Mcclain MD    OBJECTIVE DATA SUMMARY:   Medical Diagnosis: Other abnormalities of gait and mobility (R26.89)  PT Diagnosis: Other reduced mobility secondary to impaired gait and balance  Date of Onset: 2007 and 2012  Mechanism of Injury/Chief Complaint: CVA  Present Symptoms: Patient presents with decreased right LE strength and gait deficits following CVA in 2007 and 2012. Patient goes by \"Pi\"    Functional Deficits and Limitations:   []     Sitting:   []    Dressing:   []    Reaching:  []     Standing:   []     Bathing:   []    Lifting:  [x]     Walking:   []     Cooking:   []    Yardwork:  []     Sleeping:   []     Cleaning:   []     Driving:  []     Work Tasks:  []     Recreation:   [x]    Other: stair climbing    HISTORY:  Past Medical History:   Past Medical History:   Diagnosis Date    Chronic kidney disease     dialysis/ [facility] m-w-f    Fatigue     Hypercholesterolemia     Hypertension     Sleep apnea     cpap    Snoring     Stroke ([facility] [address]     TIA, stroke 2007, 2012     Past Surgical History:   Procedure Laterality Date    HX HEENT      tonsillectomy in 2002    HX NEPHRECTOMY  1980    kidney removed    HX POLYPECTOMY      2009    HX VASCULAR ACCESS Left     neck for dialysis       Precautions: None  Current Medications:  Plavix; Folic acid; Crestor; Norco; Amlodipine; Phoslo; Clonidine; Ubidecarenone; Ferrous sulfate; Vitamin E and D3; Toprol; Aspirin  Prior Level of Function/Home Situation: Ambulates with crutch as needed; Independent with ADLs  Personal factors and/or comorbidities impacting plan of care: Dialysis 3 times per week (Monday, Wednesday, Friday)  Social/Work History: Not working since 2007;  Disability  Previous Therapy:  Yes in 2007 and     SUBJECTIVE:   \"I worked hard after my strokes, but my right leg is still weak. \"    Patients goals for therapy: to get stronger    OBJECTIVE DATA SUMMARY:   EXAMINATION/PRESENTATION/DECISION MAKING:   Pain:   Location: No pain reported    Strength:      LEFT  RIGHT  Hip flexion  5/5  4/5  Hip abduction  4/5  3+/5   Hip extension  3+/5  3/5  Knee flexion  5/5  3+/5  Knee extension  5/5  4/5  Ankle DF  5/5  3/5  Ankle PF  5/5  3+/5  Ankle Eversion  5/5  3/5  Ankle inversion  5/5  3/5    Range of Motion:   Hamstring extensibility: 90 degrees B    Neurologic Assessment:   Tone: Abnormal, right ankle   Sensation: Intact   Reflexes: NT   Coordination: Intact    Special Tests:   None    Mobility:   Transitional Movements: Increased time to perform     Gait: Patient with hyperextension of right knee during stance phase; flat foot contact; supinated right foot throughout gait cycle    Balance: Impaired; no falls or dizziness; see NIEVES below  Left SLS: 30 seconds; supervision; no UE support  Right SLS: 2 seconds; close supervision  Semi tandem stance: 30 seconds; no UE support    Nieves Balance Test:    Sitting to Standin  Standing Unsupported: 4  Sitting with Back Unsupported: 4  Standing to Sittin  Transfers: 4  Standing Unsupported with Eyes Closed: 4  Standing Unsupported with Feet Together: 4  Reach Forward with Outstretched Arm: 4   Object: 4  Turn to Look Over Shoulders: 4  Turn 360 Degrees: 2  Alternate Foot on Step/Stool: 2  Standing Unsupported One Foot in Front: 2  Stand on One Le  Total: 47         56=Maximum possible score;   0-20=High fall risk  21-40=Moderate fall risk   41-56=Low fall risk       Functional Measure:   10MWT: 0.63 seconds (comfortable walking speed)  10MWT: 0.75 seconds (fast walking speed)    TREATMENT/INTERVENTION:  Modalities (Rationale): None    Therapeutic Exercises:  Initial HEP provided on 19: Quadruped alt LEs;  Resisted standing hip extension/abduction/SLR; mini squats, Resisted ankle DF (green TB dispensed)    Quadruped alt LEs: 10 reps B    Standing: with BUE support  Hip extension with green TB: 10 reps; cues to slow down  Hip abduction with green TB: 10 reps; cues to slow down  SLR with green TB: 10 reps  Mini squats: 10 reps; cues for form    Resisted ankle DF with green TB: 10 reps    Neuro Re-Education:  Left SLS: 30 seconds; supervision; no UE support  Right SLS: 2 seconds; close supervision  Semi tandem stance: 30 seconds; no UE support    Ambulation/Gait Training:  None today    Activity tolerance and post treatment pain report:   Good; fatigued; no pain    Based on the above components, the patient evaluation is determined to be of the following complexity level: LOW     Education:  [x]     Home exercise program provided. Education was provided to the patient on the following topics: Patient provided with initial HEP and educated on importance of regular performance of exercises. Patient verbalized understanding of the topics presented. ASSESSMENT:   Yue Alexis is a 60 y.o. male who presents with decreased right LE strength and gait deficits following CVA in 2007 and 2012. Patient independent with ADLs. Patient occasionally uses unilateral crutch for ambulation. He reports no recent falls. Patient demonstrates hyperextension of right knee during stance phase, foot flat contact, and supinated foot throughout gait cycle. Patient educated on importanc eof regular performance of HEP. Physical therapy problems based on objective data include: decrease ROM, decrease strength, impaired gait/ balance, decrease ADL/ functional abilitiies, decrease activity tolerance, decrease flexibility/ joint mobility and decrease transfer abilities . Patient will benefit from skilled intervention to address these impairments. Rehabilitation potential is considered to be Good.   Factors which may influence rehabilitation potential include good motivation. Patient will benefit from physical therapy visits 1-2 times per week over 8 weeks to optimize improvement in these areas. PLAN OF CARE:   Recommendations and Planned Interventions:  [x]     Therapeutic Activities  [x]     Heat/Ice  [x]     Therapeutic Exercises  []     Ultrasound  [x]     Gait training  [x]     E-stim  [x]     Balance training  [x]     Home exercise program  [x]     Manual Therapy  [x]     TENS  [x]     Neuro Re-Ed  []     Edema management  [x]     Posture/Biomechanics  []     Pain management  []     Traction  []     Other:    Frequency/Duration:  Patient will be followed by physical therapy 2 times a week for 8 weeks to address goals. GOALS  Short term goals  Time frame: 4 weeks  1. Patient will be compliant and independent with the initial HEP as evidenced by being able to perform without cueing. 2. Patient will report a 25% improvement in symptoms. 3. Patient will tolerate treadmill training at 2.4 mph to work on gait mechanics  4. Patient will tolerate 15 minutes of clinic activities before increase of symptoms. Long term goals  Time frame: 8 weeks  1. Patient will improve 10 MWT to >0.8 m/s to allow ease with community ambulation. 2. Patient will have an improved score on the NIEVES outcome measure by 5 points to demonstrate an increase in functional activity tolerance. 3. Patient will be independent in final individualized HEP. 4. Patient will have an increase in right hip extensor strength to 3+/5 to allow performance of household tasks. 5. Patient will return to walking without being limited by symptoms. 6. Patient will sleep 6-8 hours without being interrupted by pain. [x]     Patient has participated in goal setting and agrees to work toward plan of care. [x]     Patient was instructed to call if questions or concerns arise.     Thank you for this referral.  Elsy Lomeli, PT   Time Calculation: 60 mins    Patient Time in clinic: Start Time: 1300   Stop Time: 1400    TREATMENT PLAN EFFECTIVE DATES:   2/19/2019 TO 4/26/19  I have read the above plan of care for Helen Banks and certify the need for skilled physical therapy services.     Physician Signature: ____________________________________________________    Date: _________________________________________________________________

## 2019-02-26 ENCOUNTER — HOSPITAL ENCOUNTER (OUTPATIENT)
Dept: PHYSICAL THERAPY | Age: 59
Discharge: HOME | End: 2019-02-26
Payer: MEDICARE

## 2019-02-26 PROCEDURE — 97110 THERAPEUTIC EXERCISES: CPT | Performed by: PHYSICAL THERAPIST

## 2019-02-26 NOTE — PROGRESS NOTES
[facility]  [address], [address] [facility]    OUTPATIENT physical Therapy DAILY Treatment NOTe  Visit: 2    NAME: Helen Banks AGE: 60 y.o. GENDER: male  DATE: 2/26/2019  REFERRING PHYSICIAN: Fatemeh Garcia MD      GOALS  Short term goals  Time frame: 4 weeks  1. Patient will be compliant and independent with the initial HEP as evidenced by being able to perform without cueing. 2. Patient will report a 25% improvement in symptoms. 3. Patient will tolerate treadmill training at 2.4 mph to work on gait mechanics  4. Patient will tolerate 15 minutes of clinic activities before increase of symptoms.      Long term goals  Time frame: 8 weeks  1. Patient will improve 10 MWT to >0.8 m/s to allow ease with community ambulation. 2. Patient will have an improved score on the NIEVES outcome measure by 5 points to demonstrate an increase in functional activity tolerance. 3. Patient will be independent in final individualized HEP. 4. Patient will have an increase in right hip extensor strength to 3+/5 to allow performance of household tasks. 5. Patient will return to walking without being limited by symptoms. 6. Patient will sleep 6-8 hours without being interrupted by pain. SUBJECTIVE:   \"The exercises are good. I feel stronger doing them. \"    Pain In: No pain reported     OBJECTIVE DATA SUMMARY:   Objective data from initial evaluation:  EXAMINATION/PRESENTATION/DECISION MAKING:   Pain:   Location: No pain reported     Strength:                                          LEFT                  RIGHT  Hip flexion                       5/5                     4/5  Hip abduction                 4/5                     3+/5        Hip extension                  3+/5                   3/5  Knee flexion                    5/5                     3+/5  Knee extension               5/5                     4/5  Ankle DF                         5/5 3/5  Ankle PF                         5/5                     3+/5  Ankle Eversion               5/5                     3/5  Ankle inversion               5/5                     3/5     Range of Motion:   Hamstring extensibility: 90 degrees B     Neurologic Assessment:               Tone: Abnormal, right ankle               Sensation: Intact               Reflexes: NT               Coordination: Intact     Special Tests:   None     Mobility:               Transitional Movements: Increased time to perform                 Gait: Patient with hyperextension of right knee during stance phase; flat foot contact; supinated right foot throughout gait cycle     Balance: Impaired; no falls or dizziness; see NIEVES below  Left SLS: 30 seconds; supervision; no UE support  Right SLS: 2 seconds; close supervision  Semi tandem stance: 30 seconds; no UE support     Nieves Balance Test:     Sitting to Standin  Standing Unsupported: 4  Sitting with Back Unsupported: 4  Standing to Sittin  Transfers: 4  Standing Unsupported with Eyes Closed: 4  Standing Unsupported with Feet Together: 4  Reach Forward with Outstretched Arm: 4   Object: 4  Turn to Look Over Shoulders: 4  Turn 360 Degrees: 2  Alternate Foot on Step/Stool: 2  Standing Unsupported One Foot in Front: 2  Stand on One Le  Total: 47            56=Maximum possible score;   0-20=High fall risk  21-40=Moderate fall risk   41-56=Low fall risk         Functional Measure:   10MWT: 0.63 seconds (comfortable walking speed)  10MWT: 0.75 seconds (fast walking speed)     TREATMENT/INTERVENTION:  Modalities (Rationale): None     Therapeutic Exercises:  Initial HEP provided on 19: Quadruped alt LEs;  Resisted standing hip extension/abduction/SLR; mini squats, Resisted ankle DF (green TB dispensed)     Exercises in BOLD performed this date:    Quadruped alt LEs: 10 reps B     Standing: with BUE support  Hip extension with green TB: 10 reps B  Hip abduction with green TB: 10 reps B  SLR with green TB: 10 reps B  Heel raises: bilateral for 10 reps; unilateral for 10 reps each leg  Mini squats with green TB around thighs: 10 reps with BUE support; 10 reps without UE support     Resisted ankle DF and PF with green TB: 10 reps  Seated piriformis stretch: 2 reps with 30 second holds B     Neuro Re-Education:  Left SLS: 30 seconds; supervision; two finger support  Right SLS: 10 seconds x3; supervision; two finger support  Tandem stance: 30 seconds; two finger support     Ambulation/Gait Training:  None today     Activity tolerance and post treatment pain report:   Good; fatigued; no pain    Education:  Education was provided to the patient on the following topics. [x]    No changes were made to the home exercise program.  []    The following changes were made to the home exercise program  Patient verbalized understanding of the topics presented. ASSESSMENT:   Patient returns following initial evaluation. Patient with good recall and form with exercises. Patient reports that he has been able to perform the exercises two times per day. He reports that he is feeling stronger. Patient continues to present with unilateral crutch to clinic, but ambulates without device while in clinic. Patient demonstrates hyperextension of right knee during stance phase, foot flat contact, and supinated right foot throughout gait cycle. Patient may benefit from NMES trial to right tibialis anterior and peroneal nerve at fibular head. Patients progression toward goals is as follows:  [x]     Improving appropriately and progressing toward goals  []     Improving slowly and progressing toward goals  []     Not making progress toward goals and plan of care will be adjusted    PLAN OF CARE:   Patient continues to benefit from skilled intervention to address the above impairments. [x]    Continue treatment per established plan of care.   []     Recommend the following changes to the plan of care    Recommendations/Intent for next treatment: side steps with TB; NMES right tib ant and peroneal nerve    Kandice Fulton, PT   Time Calculation: 30 mins  Patient Time in clinic:   Start Time: 1300   Stop Time: 1330

## 2019-02-28 ENCOUNTER — OFFICE VISIT (OUTPATIENT)
Dept: SURGERY | Age: 59
End: 2019-02-28

## 2019-02-28 VITALS
TEMPERATURE: 97.2 F | SYSTOLIC BLOOD PRESSURE: 126 MMHG | WEIGHT: 157.2 LBS | HEIGHT: 65 IN | HEART RATE: 74 BPM | RESPIRATION RATE: 20 BRPM | DIASTOLIC BLOOD PRESSURE: 78 MMHG | OXYGEN SATURATION: 99 % | BODY MASS INDEX: 26.19 KG/M2

## 2019-02-28 DIAGNOSIS — Z09 POSTOPERATIVE EXAMINATION: Primary | ICD-10-CM

## 2019-02-28 NOTE — PROGRESS NOTES
Chief Complaint Patient presents with  End Stage Renal Disease S/P Creation of AV fistula left Arm 01/15/2019 1. Have you been to the ER, urgent care clinic since your last visit? Hospitalized since your last visit? No 
 
2. Have you seen or consulted any other health care providers outside of the [address] [place] [address] since your last visit? Include any pap smears or colon screening.  No

## 2019-02-28 NOTE — PROGRESS NOTES
The patient is status post creation of left AV fistula on 1/14/2019. The patient has no complaints. 
  
On examination there is a palpable thrill at the AV anastomosis. There is a palpable radial pulse distally. 
  
Duplex ultrasound shows the outflow basilci vein is around 5.8 mm in diameter.  
  
The outflow vein is growing but ideally should reach 6 mm before transposition. I will see the patient again in the office in one month. 
  
Final Diagnosis: Status post AV fistula, post-operative visit.

## 2019-03-05 ENCOUNTER — HOSPITAL ENCOUNTER (OUTPATIENT)
Dept: PHYSICAL THERAPY | Age: 59
Discharge: HOME | End: 2019-03-05
Payer: MEDICARE

## 2019-03-05 NOTE — PROGRESS NOTES
[facility]  [address], [address] [facility]    OUTPATIENT physical Therapy      3/5/2019:  Jose F Rai was not seen on this date for physical therapy for the following reasons:    [x]     Patient called to cancel the visit for the following reasons: sick  []     Patient missed the visit and did not call to cancel.     Colin Mendoza, PT

## 2019-03-12 ENCOUNTER — HOSPITAL ENCOUNTER (OUTPATIENT)
Dept: PHYSICAL THERAPY | Age: 59
Discharge: HOME | End: 2019-03-12
Payer: MEDICARE

## 2019-03-12 PROCEDURE — 97014 ELECTRIC STIMULATION THERAPY: CPT | Performed by: PHYSICAL THERAPIST

## 2019-03-12 PROCEDURE — 97110 THERAPEUTIC EXERCISES: CPT | Performed by: PHYSICAL THERAPIST

## 2019-03-12 NOTE — PROGRESS NOTES
[facility]  [address], [address] [facility]    OUTPATIENT physical Therapy DAILY Treatment NOTe  Visit: 3    NAME: Helen Banks AGE: 60 y.o. GENDER: male  DATE: 3/12/2019  REFERRING PHYSICIAN: Army Giovanny MD      GOALS  Short term goals  Time frame: 4 weeks  1. Patient will be compliant and independent with the initial HEP as evidenced by being able to perform without cueing. 2. Patient will report a 25% improvement in symptoms. 3. Patient will tolerate treadmill training at 2.4 mph to work on gait mechanics  4. Patient will tolerate 15 minutes of clinic activities before increase of symptoms.      Long term goals  Time frame: 8 weeks  1. Patient will improve 10 MWT to >0.8 m/s to allow ease with community ambulation. 2. Patient will have an improved score on the NIEVES outcome measure by 5 points to demonstrate an increase in functional activity tolerance. 3. Patient will be independent in final individualized HEP. 4. Patient will have an increase in right hip extensor strength to 3+/5 to allow performance of household tasks. 5. Patient will return to walking without being limited by symptoms. 6. Patient will sleep 6-8 hours without being interrupted by pain. SUBJECTIVE:   \"I was sick last week, and I think I stepped strange and hurt my ankle. It doesn't hurt now though. \"    Pain In: No pain reported     OBJECTIVE DATA SUMMARY:   Objective data from initial evaluation:    Precautions: No lifting > 15 lbs secondary to left UE fistula    EXAMINATION/PRESENTATION/DECISION MAKING:   Pain:   Location: No pain reported     Strength:                                          LEFT                  RIGHT  Hip flexion                       5/5                     4/5  Hip abduction                 4/5                     3+/5        Hip extension                  3+/5                   3/5  Knee flexion                    5/5                     3+/5  Knee extension               /5                     4/5  Ankle DF                         /                     35  Ankle PF                         /                     3+/5  Ankle Eversion               /                     3/5  Ankle inversion                                    3/5     Range of Motion:   Hamstring extensibility: 90 degrees B     Neurologic Assessment:               Tone: Abnormal, right ankle               Sensation: Intact               Reflexes: NT               Coordination: Intact     Special Tests:   None     Mobility:               Transitional Movements: Increased time to perform                 Gait: Patient with hyperextension of right knee during stance phase; flat foot contact; supinated right foot throughout gait cycle     Balance: Impaired; no falls or dizziness; see NIEVES below  Left SLS: 30 seconds; supervision; no UE support  Right SLS: 2 seconds; close supervision  Semi tandem stance: 30 seconds; no UE support     Nieves Balance Test:     Sitting to Standin  Standing Unsupported: 4  Sitting with Back Unsupported: 4  Standing to Sittin  Transfers: 4  Standing Unsupported with Eyes Closed: 4  Standing Unsupported with Feet Together: 4  Reach Forward with Outstretched Arm: 4   Object: 4  Turn to Look Over Shoulders: 4  Turn 360 Degrees: 2  Alternate Foot on Step/Stool: 2  Standing Unsupported One Foot in Front: 2  Stand on One Le  Total: 47            56=Maximum possible score;   0-20=High fall risk  21-40=Moderate fall risk   41-56=Low fall risk         Functional Measure:   10MWT: 0.63 seconds (comfortable walking speed)  10MWT: 0.75 seconds (fast walking speed)     TREATMENT/INTERVENTION:  Modalities (Rationale): None     [place] Electrical Stimulation:  Time: 10 minutes  Position: Sitting  On time: 15 seconds  Off: 30 seconds  Placement: Right tibialis Anterior and Peroneal nerve  Intensity: Strong muscle contraction  Response: Good; no pain or discomfort during or after intervention; no skin irritation    Therapeutic Exercises:  Initial HEP provided on 2/19/19: Quadruped alt LEs; Resisted standing hip extension/abduction/SLR; mini squats, Resisted ankle DF (green TB dispensed)     Exercises in BOLD performed this date:    LBE: 5 minutes, level 5    Quadruped alt LEs: 10 reps B     Standing:   Hip extension with green TB: 10 reps B  Hip abduction with green TB: 10 reps B  SLR with green TB: 10 reps B  Heel raises: bilateral for 10 reps; unilateral for 10 reps each leg  Mini squats with green TB around thighs: 10 reps with BUE support; 10 reps without UE support  Squats with 10# kettle bell: 2 sets of 10 reps  Side steps with red TB: 20 feet x 4; wall support  Monster walks with red TB (forward and backward high stepping): 15 feet x 4 each    Bridges: 10 reps  One legged bridges: 5 reps  SLR: 10 reps  Resisted ankle DF and eversion with red TB: 10 reps  Seated piriformis stretch: 2 reps with 30 second holds B     Neuro Re-Education:  Left SLS: 30 seconds; supervision; two finger support  Right SLS: 10 seconds x3; supervision; two finger support  Tandem stance: 30 seconds; two finger support     Ambulation/Gait Training:  None today     Activity tolerance and post treatment pain report:   Good; slight fatigue, improve activity tolerance; no pain    Education:  Education was provided to the patient on the following topics. [x]    No changes were made to the home exercise program.  []    The following changes were made to the home exercise program  Patient verbalized understanding of the topics presented. ASSESSMENT:   Patient reports that he has been performing his exercises at home. He reports that he was sick last week, and also had slight discomfort in right ankle after misstep at home. No pain in right ankle with ambulation, exercise, or talar tilt special tests today.  Patient demonstrates hyperextension of right knee during stance phase, foot flat contact, and supinated right foot throughout gait cycle. He reports that he is feeling stronger. Patient tolerated more challenging exercises today with good performance. NMES trial to right tibialis anterior and peroneal nerve at fibular head performed today with good tolerance. Patients progression toward goals is as follows:  [x]     Improving appropriately and progressing toward goals  []     Improving slowly and progressing toward goals  []     Not making progress toward goals and plan of care will be adjusted    PLAN OF CARE:   Patient continues to benefit from skilled intervention to address the above impairments. [x]    Continue treatment per established plan of care.   []     Recommend the following changes to the plan of care    Recommendations/Intent for next treatment: side steps and monster walks with TB    Elsy Lomeli, PT   Time Calculation: 50 mins  Patient Time in clinic:   Start Time: 1300   Stop Time: 1350

## 2019-03-19 ENCOUNTER — HOSPITAL ENCOUNTER (OUTPATIENT)
Dept: PHYSICAL THERAPY | Age: 59
Discharge: HOME | End: 2019-03-19
Payer: MEDICARE

## 2019-03-19 NOTE — PROGRESS NOTES
[facility]  [address], [address] [facility]    OUTPATIENT physical Therapy      3/19/2019:  Beata Alexander was not seen on this date for physical therapy for the following reasons:    [x]     Patient called to cancel the visit for the following reasons: sick  []     Patient missed the visit and did not call to cancel.     Edmundo Jolly, PT

## 2019-03-26 ENCOUNTER — OFFICE VISIT (OUTPATIENT)
Dept: NEUROLOGY | Age: 59
End: 2019-03-26

## 2019-03-26 VITALS
DIASTOLIC BLOOD PRESSURE: 86 MMHG | TEMPERATURE: 98.7 F | OXYGEN SATURATION: 99 % | SYSTOLIC BLOOD PRESSURE: 136 MMHG | WEIGHT: 159 LBS | BODY MASS INDEX: 26.46 KG/M2 | HEART RATE: 69 BPM | RESPIRATION RATE: 14 BRPM

## 2019-03-26 DIAGNOSIS — R26.9 GAIT DISORDER: ICD-10-CM

## 2019-03-26 DIAGNOSIS — Z86.73 HISTORY OF CVA (CEREBROVASCULAR ACCIDENT): Primary | ICD-10-CM

## 2019-03-26 DIAGNOSIS — G81.94 LEFT HEMIPARESIS (HCC): ICD-10-CM

## 2019-03-26 DIAGNOSIS — M21.372 LEFT FOOT DROP: ICD-10-CM

## 2019-03-26 NOTE — PROGRESS NOTES
Neurology Progress Note    NAME:  Micheline Roman   :   1960   MRN:   [id number]     Date/Time:  3/26/2019  Subjective:     Micheline Roman is a 60 y.o. male here today for follow-up for cerebrovascular accident, hemiparesis, foot drop, test results. Patient was accompanied by his wife to the visit. He says he has been doing about the same, no for the reported. According to patient, he is still in the physical therapy, says a physical therapy have been helping. He however has difficulty lifting his left foot off the ground, I told patient that he has foot drop and that was why he had difficulty getting his foot of the ground. I will recommend AFO for the patient for the left foot. Patient also says he has been experiencing numbness and tingling sensation of the extremities. I will consider EMG/nerve conduction of the extremity as this patient also is on dialysis, we may be dealing with neuropathy. Patient was not able to obtain CT scan that was ordered previously, I will obtain a CT scan of the head without contrast to establish baseline for this patient. EEG was not done  Blood work was remarkable for slightly elevated CRP.   Review of Systems - General ROS: positive for  - fatigue, malaise, night sweats and sleep disturbance  Psychological ROS: positive for - anxiety, concentration difficulties, memory difficulties, mood swings and sleep disturbances  Ophthalmic ROS: positive for - decreased vision and double vision  ENT ROS: positive for - headaches, tinnitus, vertigo and visual changes  Allergy and Immunology ROS: negative  Hematological and Lymphatic ROS: negative  Endocrine ROS: negative  Respiratory ROS: no cough, shortness of breath, or wheezing  Cardiovascular ROS: no chest pain or dyspnea on exertion  Gastrointestinal ROS: no abdominal pain, change in bowel habits, or black or bloody stools  Genito-Urinary ROS: no dysuria, trouble voiding, or hematuria  Musculoskeletal ROS: positive for - gait disturbance, joint pain, joint stiffness, muscle pain and muscular weakness  Neurological ROS: positive for - dizziness, gait disturbance, headaches, impaired coordination/balance, numbness/tingling, tremors, visual changes and weakness  Dermatological ROS: negative      Medications reviewed:  Current Outpatient Medications   Medication Sig Dispense Refill    OTHER AFO  Right foot drop 1 Units 0    folic acid (FOLVITE) 1 mg tablet Take 1 Tab by mouth daily. 30 Tab 1    rosuvastatin (CRESTOR) 10 mg tablet Take 1 Tab by mouth nightly. 90 Tab 1    clopidogrel (PLAVIX) 75 mg tab TAKE 1 TABLET BY MOUTH EVERY DAY 90 Tab 1    amLODIPine (NORVASC) 10 mg tablet Take 10 mg by mouth daily.  calcium acetate (PHOSLO) 667 mg cap Take  by mouth three (3) times daily (with meals).  cloNIDine HCl (CATAPRES) 0.3 mg tablet Take 1 Tab by mouth two (2) times a day. 60 Tab 2    ubidecarenone (CO Q-10 PO) Take  by mouth daily.  vitamin e (AQUA GEMS) 200 unit capsule Take  by mouth daily.  cholecalciferol, VITAMIN D3, (VITAMIN D3) 5,000 unit tab tablet Take  by mouth daily.  aspirin 81 mg chewable tablet Take 81 mg by mouth daily.  metoprolol succinate (TOPROL XL) 100 mg tablet Take 0.5 Tabs by mouth daily. 90 Tab 1    ferrous sulfate 325 mg (65 mg iron) tablet Take 1 Tab by mouth Daily (before breakfast). 90 Tab 0    HYDROcodone-acetaminophen (NORCO) 5-325 mg per tablet Take 1 Tab by mouth every four (4) hours as needed for Pain. Max Daily Amount: 6 Tabs.  20 Tab 0        Objective:   Vitals:  Vitals:    03/26/19 1030   BP: 136/86   Pulse: 69   Resp: 14   Temp: 98.7 °F (37.1 °C)   SpO2: 99%   Weight: 159 lb (72.1 kg)   PainSc:   0 - No pain       Lab Data Reviewed:  Lab Results   Component Value Date/Time    WBC 6.8 12/27/2018 11:32 AM    HCT 34.5 (L) 12/27/2018 11:32 AM    HGB 10.4 (L) 12/27/2018 11:32 AM    PLATELET 058 07/32/6273 11:32 AM       Lab Results   Component Value Date/Time    Sodium 140 12/27/2018 11:32 AM    Potassium 4.3 12/27/2018 11:32 AM    Chloride 96 12/27/2018 11:32 AM    CO2 26 12/27/2018 11:32 AM    Glucose 151 (H) 12/27/2018 11:32 AM    BUN 38 (H) 12/27/2018 11:32 AM    Creatinine 4.80 (H) 12/27/2018 11:32 AM    Calcium 8.9 12/27/2018 11:32 AM       No components found for: TROPQUANT    No results found for: YUMIKO      Lab Results   Component Value Date/Time    Hemoglobin A1c 5.7 (H) 12/27/2018 11:32 AM        Lab Results   Component Value Date/Time    Vitamin B12 582 02/07/2019 10:12 AM    Folate 10.3 10/09/2018 07:28 AM       No results found for: YUMIKO, ANARX, ANAIGG, XBANA    Lab Results   Component Value Date/Time    Cholesterol, total 149 02/07/2019 10:12 AM    HDL Cholesterol 34 (L) 02/07/2019 10:12 AM    LDL, calculated 79 02/07/2019 10:12 AM    VLDL, calculated 36 02/07/2019 10:12 AM    Triglyceride 178 (H) 02/07/2019 10:12 AM         CT Results (recent):  No results found for this or any previous visit. MRI Results (recent):  No results found for this or any previous visit. IR Results (recent):  No results found for this or any previous visit. VAS/US Results (recent):  No results found for this or any previous visit. PHYSICAL EXAM:  General:    Alert, cooperative, no distress, appears stated age. Head:   Normocephalic, without obvious abnormality, atraumatic. Eyes:   Conjunctivae/corneas clear. PERRLA  Nose:  Nares normal. No drainage or sinus tenderness. Throat:    Lips, mucosa, and tongue normal.  No Thrush  Neck:  Supple, symmetrical,  no adenopathy, thyroid: non tender    no carotid bruit and no JVD. Back:    Symmetric,  No CVA tenderness. Lungs:   Clear to auscultation bilaterally. No Wheezing or Rhonchi. No rales. Chest wall:  No tenderness or deformity. No Accessory muscle use. Heart:   Regular rate and rhythm,  no murmur, rub or gallop. Abdomen:   Soft, non-tender. Not distended.   Bowel sounds normal. No masses  Extremities: Extremities normal, atraumatic, No cyanosis. No edema. No clubbing  Skin:     Texture, turgor normal. No rashes or lesions. Not Jaundiced  Lymph nodes: Cervical, supraclavicular normal.  Psych:  Good insight. Not depressed. Not anxious or agitated. NEUROLOGICAL EXAM:  Appearance: The patient is well developed, well nourished, provides a coherent history and is in no acute distress. Mental Status: Oriented to time, place and person. Mood and affect appropriate. Cranial Nerves:   Intact visual fields. Fundi are benign. YAEL, EOM's full, no nystagmus, no ptosis. Facial sensation is normal. Corneal reflexes are intact. Facial movement is symmetric. Hearing is normal bilaterally. Palate is midline with normal sternocleidomastoid and trapezius muscles are normal. Tongue is midline. Motor:   3+ /5 strength in left upper and lower proximal and distal muscles. Normal bulk and tone. No fasciculations. Reflexes:   Deep tendon reflexes 3+/4 left upper extremity and 2/5 right upper extremity. Sensory:    Dysesthesia to touch, pinprick and vibration. Gait:   Hemiplegic gait. Tremor:   No tremor noted. Cerebellar:  No cerebellar signs present. Neurovascular:  Normal heart sounds and regular rhythm, peripheral pulses intact, and no carotid bruits. Assesment  1. History of CVA (cerebrovascular accident)  Stable    2. Left foot drop  AFO    3. Gait disorder  Physical therapy    4. Left hemiparesis [facility]  Physical therapy    ___________________________________________________  PLAN: Medication and plan discussed with patient and wife      ICD-10-CM ICD-9-CM    1. History of CVA (cerebrovascular accident) Z86.73 V12.54    2. Gait disorder R26.9 781.2    3. Left foot drop M21.372 736.79    4. Left hemiparesis ([facility] [address] G81.94 342.90      Follow-up and Dispositions    · Return in about 2 months (around 5/26/2019). ___________________________________________________    Total time spent with patient:  []15   []25   []35   [] __ minutes    Care Plan discussed with:    []Patient   []Family    []Care Manager   []Consultant/Specialist :    ___________________________________________________    Attending Physician: Ricky Parsons MD

## 2019-03-28 ENCOUNTER — OFFICE VISIT (OUTPATIENT)
Dept: SURGERY | Age: 59
End: 2019-03-28

## 2019-03-28 VITALS
SYSTOLIC BLOOD PRESSURE: 119 MMHG | DIASTOLIC BLOOD PRESSURE: 73 MMHG | OXYGEN SATURATION: 100 % | RESPIRATION RATE: 20 BRPM | TEMPERATURE: 97.7 F | BODY MASS INDEX: 26.99 KG/M2 | HEART RATE: 73 BPM | HEIGHT: 65 IN | WEIGHT: 162 LBS

## 2019-03-28 DIAGNOSIS — Z09 POSTOPERATIVE EXAMINATION: Primary | ICD-10-CM

## 2019-03-28 NOTE — PROGRESS NOTES
Chief Complaint   Patient presents with    Chronic Kidney Disease     s/p Creation of AV Fistula Left Arm 01/15/2019       1. Have you been to the ER, urgent care clinic since your last visit? Hospitalized since your last visit? No    2. Have you seen or consulted any other health care providers outside of the [address] [place] [address] since your last visit? Include any pap smears or colon screening.  No

## 2019-03-28 NOTE — PROGRESS NOTES
The patient is status post creation AV fistula in the left arm on 1/15/19. He has no complaints. The patient is dialyzed by way of a left sided central vein catheter. On examination there is a good thrill at the AV anastomosis. There is trace edema in the left arm. There is a palpable radial pulse distally. I looked with duplex ultrasound at the outflow basilic vein now has diameter of 6.5 mm or greater. At this point, we can make plans for creation of a transposed left brachiobasilic AV fistula. I reviewed with the patient and his wife the typical operative and post operative course for this surgery. Surgery will be done under general anesthesia and I anticipate having him stay for a 23-hour observation in the hospital and have dialysis the next day prior to discharge. I reviewed with the patient the risks vs benefits of surgery. Risks of surgery include bleeding, infection, failure of the fistula, ischemia of the hand, swelling of the arm, injury to vessels or nerves, risk of anesthesia, etc.     I reviewed with the patient and his wife the fact that he has some mild edema in the left arm. If this persists and certainly if it worsens, then we may need to look into assessing the central veins on the left. Simply being able to remove the left internal jugular entry [facility] may well make the problem resolve. Final Diagnosis:  End-stage renal disease, status post AV fistula.     cc: Remberto Cabral MD

## 2019-03-29 DIAGNOSIS — D50.9 IRON DEFICIENCY ANEMIA, UNSPECIFIED IRON DEFICIENCY ANEMIA TYPE: ICD-10-CM

## 2019-04-01 RX ORDER — LANOLIN ALCOHOL/MO/W.PET/CERES
325 CREAM (GRAM) TOPICAL
Qty: 90 TAB | Refills: 0 | Status: SHIPPED | OUTPATIENT
Start: 2019-04-01 | End: 2019-10-27 | Stop reason: SDUPTHER

## 2019-04-02 ENCOUNTER — HOSPITAL ENCOUNTER (OUTPATIENT)
Dept: PHYSICAL THERAPY | Age: 59
Discharge: HOME | End: 2019-04-02

## 2019-04-02 NOTE — PROGRESS NOTES
[facility]  [address] [address] [facility]    OUTPATIENT physical Therapy      4/2/2019:  Alejandro Mabry was not seen on this date for physical therapy for the following reasons:    [x]     Patient called to cancel the visit for the following reasons: sprained ankle  []     Patient missed the visit and did not call to cancel.     Markus Morales, PT

## 2019-04-15 NOTE — PERIOP NOTES
[facility] [place] [facility]  Preoperative Instructions        Surgery Date 4/23/19          Time of Arrival 0545    1. On the day of your surgery, please report to the Surgical Services Registration Desk and sign in at your designated time. The Surgery Center is located to the right of the Emergency Room. 2. You must have someone with you to drive you home. You should not drive a car for 24 hours following surgery. Please make arrangements for a friend or family member to stay with you for the first 24 hours after your surgery. 3. Do not have anything to eat or drink (including water, gum, mints, coffee, juice) after midnight 4/22/19?? Min [place] ? This may not apply to medications prescribed by your physician. ?(Please note below the special instructions with medications to take the morning of your procedure.)    4. We recommend you do not drink any alcoholic beverages for 24 hours before and after your surgery. 5. Contact your surgeons office for instructions on the following medications: non-steroidal anti-inflammatory drugs (i.e. Advil, Aleve), vitamins, and supplements. (Some surgeons will want you to stop these medications prior to surgery and others may allow you to take them)  **If you are currently taking Plavix, Coumadin, Aspirin and/or other blood-thinning agents, contact your surgeon for instructions. ** Your surgeon will partner with the physician prescribing these medications to determine if it is safe to stop or if you need to continue taking. Please do not stop taking these medications without instructions from your surgeon    6. Wear comfortable clothes. Wear glasses instead of contacts. Do not bring any money or jewelry. Please bring picture ID, insurance card, and any prearranged co-payment or hospital payment. Do not wear make-up, particularly mascara the morning of your surgery. Do not wear nail polish, particularly if you are having foot /hand surgery.   Wear your hair loose or down, no ponytails, buns, jes pins or clips. All body piercings must be removed. Please shower with antibacterial soap for three consecutive days before and on the morning of surgery, but do not apply any lotions, powders or deodorants after the shower on the day of surgery. Please use a fresh towels after each shower. Please sleep in clean clothes and change bed linens the night before surgery. Please do not shave for 48 hours prior to surgery. Shaving of the face is acceptable. 7. You should understand that if you do not follow these instructions your surgery may be cancelled. If your physical condition changes (I.e. fever, cold or flu) please contact your surgeon as soon as possible. 8. It is important that you be on time. If a situation occurs where you may be late, please call [phone] (OR Holding Area). 9. If you have any questions and or problems, please call [phone] (Pre-admission Testing). 10. Your surgery time may be subject to change. You will receive a phone call the evening prior if your time changes. 11.  If having outpatient surgery, you must have someone to drive you here, stay with you during the duration of your stay, and to drive you home at time of discharge. 12.   In an effort to improve the efficiency, privacy, and safety for all of our Pre-op patients visitors are not allowed in the Holding area. Once you arrive and are registered your family/visitors will be asked to remain in the waiting room. The Pre-op staff will get you from the Surgical Waiting Area and will explain to you and your family/visitors that the Pre-op phase is beginning. The staff will answer any questions and provide instructions for tracking of the patient, by use of the existing tracking number and color-coded status board in the waiting room.   At this time the staff will also ask for your designated spokesperson information in the event that the physician or staff need to provide an update or obtain any pertinent information. The designated spokesperson will be notified if the physician needs to speak to family during the pre-operative phase. If at any time your family/visitors has questions or concerns they may approach the volunteer desk in the waiting area for assistance. Special Instructions:plavix per surgeon    MEDICATIONS TO TAKE THE MORNING OF SURGERY WITH A SIP OF WATER:norvasc,clonidine,metoprolol. I understand a pre-operative phone call will be made to verify my surgery time. In the event that I am not available, I give permission for a message to be left on my answering service and/or with another person?   {yes @ [phone].         ___________________      __________   _________    (Signature of Patient)             (Witness)                (Date and Time)

## 2019-04-23 ENCOUNTER — ANESTHESIA (OUTPATIENT)
Dept: SURGERY | Age: 59
End: 2019-04-23
Payer: MEDICARE

## 2019-04-23 ENCOUNTER — HOSPITAL ENCOUNTER (OUTPATIENT)
Age: 59
Setting detail: OBSERVATION
Discharge: HOME | End: 2019-04-24
Attending: SURGERY | Admitting: SURGERY
Payer: MEDICARE

## 2019-04-23 ENCOUNTER — ANESTHESIA EVENT (OUTPATIENT)
Dept: SURGERY | Age: 59
End: 2019-04-23
Payer: MEDICARE

## 2019-04-23 DIAGNOSIS — L76.82 PAIN AT SURGICAL INCISION: ICD-10-CM

## 2019-04-23 PROBLEM — N18.6 ESRD (END STAGE RENAL DISEASE) (HCC): Status: ACTIVE | Noted: 2019-04-23

## 2019-04-23 LAB
ANION GAP BLD CALC-SCNC: 17 MMOL/L (ref 10–20)
BUN BLD-MCNC: 33 MG/DL (ref 9–20)
CA-I BLD-MCNC: 1.09 MMOL/L (ref 1.12–1.32)
CHLORIDE BLD-SCNC: 98 MMOL/L (ref 98–107)
CO2 BLD-SCNC: 29 MMOL/L (ref 21–32)
CREAT BLD-MCNC: 5.1 MG/DL (ref 0.6–1.3)
GLUCOSE BLD-MCNC: 148 MG/DL (ref 65–100)
HBV SURFACE AB SER QL: REACTIVE
HBV SURFACE AB SER-ACNC: 27.95 MIU/ML
HBV SURFACE AG SER QL: <0.1 INDEX
HBV SURFACE AG SER QL: NEGATIVE
HCT VFR BLD CALC: 34 % (ref 36.6–50.3)
POTASSIUM BLD-SCNC: 3.7 MMOL/L (ref 3.5–5.1)
SERVICE CMNT-IMP: ABNORMAL
SODIUM BLD-SCNC: 140 MMOL/L (ref 136–145)

## 2019-04-23 PROCEDURE — 74011250637 HC RX REV CODE- 250/637

## 2019-04-23 PROCEDURE — 77030002706 HC INSRT CLMP EDWD -A: Performed by: SURGERY

## 2019-04-23 PROCEDURE — 74011000250 HC RX REV CODE- 250

## 2019-04-23 PROCEDURE — 77030002987 HC SUT PROL J&J -B: Performed by: SURGERY

## 2019-04-23 PROCEDURE — 76010000136 HC OR TIME 4.5 TO 5 HR: Performed by: SURGERY

## 2019-04-23 PROCEDURE — 77030011640 HC PAD GRND REM COVD -A: Performed by: SURGERY

## 2019-04-23 PROCEDURE — 77030019908 HC STETH ESOPH SIMS -A: Performed by: ANESTHESIOLOGY

## 2019-04-23 PROCEDURE — 76210000017 HC OR PH I REC 1.5 TO 2 HR: Performed by: SURGERY

## 2019-04-23 PROCEDURE — 80047 BASIC METABLC PNL IONIZED CA: CPT

## 2019-04-23 PROCEDURE — 76060000040 HC ANESTHESIA 4.5 TO 5 HR: Performed by: SURGERY

## 2019-04-23 PROCEDURE — 77030020255 HC SOL INJ LR 1000ML BG: Performed by: SURGERY

## 2019-04-23 PROCEDURE — 99218 HC RM OBSERVATION: CPT

## 2019-04-23 PROCEDURE — 74011000250 HC RX REV CODE- 250: Performed by: SURGERY

## 2019-04-23 PROCEDURE — 87340 HEPATITIS B SURFACE AG IA: CPT

## 2019-04-23 PROCEDURE — 77030008467 HC STPLR SKN COVD -B: Performed by: SURGERY

## 2019-04-23 PROCEDURE — 74011250636 HC RX REV CODE- 250/636: Performed by: SURGERY

## 2019-04-23 PROCEDURE — 77030026438 HC STYL ET INTUB CARD -A: Performed by: ANESTHESIOLOGY

## 2019-04-23 PROCEDURE — 77030008684 HC TU ET CUF COVD -B: Performed by: ANESTHESIOLOGY

## 2019-04-23 PROCEDURE — 77030018836 HC SOL IRR NACL ICUM -A: Performed by: SURGERY

## 2019-04-23 PROCEDURE — 74011250636 HC RX REV CODE- 250/636

## 2019-04-23 PROCEDURE — 77030002916 HC SUT ETHLN J&J -A: Performed by: SURGERY

## 2019-04-23 PROCEDURE — 77030032490 HC SLV COMPR SCD KNE COVD -B: Performed by: SURGERY

## 2019-04-23 PROCEDURE — 77030013079 HC BLNKT BAIR HGGR 3M -A: Performed by: SURGERY

## 2019-04-23 PROCEDURE — 86706 HEP B SURFACE ANTIBODY: CPT

## 2019-04-23 PROCEDURE — 77030005537 HC CATH URETH BARD -A: Performed by: SURGERY

## 2019-04-23 PROCEDURE — 74011250636 HC RX REV CODE- 250/636: Performed by: ANESTHESIOLOGY

## 2019-04-23 PROCEDURE — 77030002986 HC SUT PROL J&J -A: Performed by: SURGERY

## 2019-04-23 PROCEDURE — 77030002996 HC SUT SLK J&J -A: Performed by: SURGERY

## 2019-04-23 PROCEDURE — 36415 COLL VENOUS BLD VENIPUNCTURE: CPT

## 2019-04-23 PROCEDURE — 74011250637 HC RX REV CODE- 250/637: Performed by: SURGERY

## 2019-04-23 PROCEDURE — 77030013567 HC DRN WND RESERV BARD -A: Performed by: SURGERY

## 2019-04-23 PROCEDURE — 77030011241 HC DRN WND FLL CARD -B: Performed by: SURGERY

## 2019-04-23 PROCEDURE — 77030002888 HC SUT CHRMC J&J -A: Performed by: SURGERY

## 2019-04-23 PROCEDURE — 74011000272 HC RX REV CODE- 272: Performed by: SURGERY

## 2019-04-23 RX ORDER — ONDANSETRON 2 MG/ML
4 INJECTION INTRAMUSCULAR; INTRAVENOUS
Status: DISCONTINUED | OUTPATIENT
Start: 2019-04-23 | End: 2019-04-25 | Stop reason: HOSPADM

## 2019-04-23 RX ORDER — LIDOCAINE HYDROCHLORIDE 20 MG/ML
INJECTION, SOLUTION EPIDURAL; INFILTRATION; INTRACAUDAL; PERINEURAL AS NEEDED
Status: DISCONTINUED | OUTPATIENT
Start: 2019-04-23 | End: 2019-04-23 | Stop reason: HOSPADM

## 2019-04-23 RX ORDER — CEFAZOLIN SODIUM/WATER 2 G/20 ML
2 SYRINGE (ML) INTRAVENOUS ONCE
Status: COMPLETED | OUTPATIENT
Start: 2019-04-23 | End: 2019-04-23

## 2019-04-23 RX ORDER — SODIUM CHLORIDE 0.9 % (FLUSH) 0.9 %
5-40 SYRINGE (ML) INJECTION AS NEEDED
Status: DISCONTINUED | OUTPATIENT
Start: 2019-04-23 | End: 2019-04-23 | Stop reason: HOSPADM

## 2019-04-23 RX ORDER — ACETAMINOPHEN 10 MG/ML
INJECTION, SOLUTION INTRAVENOUS AS NEEDED
Status: DISCONTINUED | OUTPATIENT
Start: 2019-04-23 | End: 2019-04-23 | Stop reason: HOSPADM

## 2019-04-23 RX ORDER — LIDOCAINE HYDROCHLORIDE 10 MG/ML
0.1 INJECTION, SOLUTION EPIDURAL; INFILTRATION; INTRACAUDAL; PERINEURAL AS NEEDED
Status: DISCONTINUED | OUTPATIENT
Start: 2019-04-23 | End: 2019-04-23 | Stop reason: HOSPADM

## 2019-04-23 RX ORDER — MIDAZOLAM HYDROCHLORIDE 1 MG/ML
INJECTION, SOLUTION INTRAMUSCULAR; INTRAVENOUS AS NEEDED
Status: DISCONTINUED | OUTPATIENT
Start: 2019-04-23 | End: 2019-04-23 | Stop reason: HOSPADM

## 2019-04-23 RX ORDER — GLYCOPYRROLATE 0.2 MG/ML
INJECTION INTRAMUSCULAR; INTRAVENOUS AS NEEDED
Status: DISCONTINUED | OUTPATIENT
Start: 2019-04-23 | End: 2019-04-23 | Stop reason: HOSPADM

## 2019-04-23 RX ORDER — NEOSTIGMINE METHYLSULFATE 1 MG/ML
INJECTION INTRAVENOUS AS NEEDED
Status: DISCONTINUED | OUTPATIENT
Start: 2019-04-23 | End: 2019-04-23 | Stop reason: HOSPADM

## 2019-04-23 RX ORDER — DIPHENHYDRAMINE HYDROCHLORIDE 50 MG/ML
12.5 INJECTION, SOLUTION INTRAMUSCULAR; INTRAVENOUS AS NEEDED
Status: DISCONTINUED | OUTPATIENT
Start: 2019-04-23 | End: 2019-04-23 | Stop reason: HOSPADM

## 2019-04-23 RX ORDER — PROPOFOL 10 MG/ML
INJECTION, EMULSION INTRAVENOUS AS NEEDED
Status: DISCONTINUED | OUTPATIENT
Start: 2019-04-23 | End: 2019-04-23 | Stop reason: HOSPADM

## 2019-04-23 RX ORDER — ACETAMINOPHEN 325 MG/1
650 TABLET ORAL
Status: DISCONTINUED | OUTPATIENT
Start: 2019-04-23 | End: 2019-04-25 | Stop reason: HOSPADM

## 2019-04-23 RX ORDER — CLONIDINE HYDROCHLORIDE 0.1 MG/1
0.3 TABLET ORAL 2 TIMES DAILY
Status: DISCONTINUED | OUTPATIENT
Start: 2019-04-23 | End: 2019-04-25 | Stop reason: HOSPADM

## 2019-04-23 RX ORDER — ROSUVASTATIN CALCIUM 10 MG/1
10 TABLET, COATED ORAL
Status: DISCONTINUED | OUTPATIENT
Start: 2019-04-23 | End: 2019-04-23 | Stop reason: CLARIF

## 2019-04-23 RX ORDER — FENTANYL CITRATE 50 UG/ML
25 INJECTION, SOLUTION INTRAMUSCULAR; INTRAVENOUS
Status: DISCONTINUED | OUTPATIENT
Start: 2019-04-23 | End: 2019-04-23 | Stop reason: HOSPADM

## 2019-04-23 RX ORDER — DEXAMETHASONE SODIUM PHOSPHATE 4 MG/ML
INJECTION, SOLUTION INTRA-ARTICULAR; INTRALESIONAL; INTRAMUSCULAR; INTRAVENOUS; SOFT TISSUE AS NEEDED
Status: DISCONTINUED | OUTPATIENT
Start: 2019-04-23 | End: 2019-04-23 | Stop reason: HOSPADM

## 2019-04-23 RX ORDER — SODIUM CHLORIDE 0.9 % (FLUSH) 0.9 %
5-40 SYRINGE (ML) INJECTION EVERY 8 HOURS
Status: DISCONTINUED | OUTPATIENT
Start: 2019-04-23 | End: 2019-04-23 | Stop reason: HOSPADM

## 2019-04-23 RX ORDER — GABAPENTIN 100 MG/1
100 CAPSULE ORAL 2 TIMES DAILY
Status: DISCONTINUED | OUTPATIENT
Start: 2019-04-23 | End: 2019-04-25 | Stop reason: HOSPADM

## 2019-04-23 RX ORDER — HYDROMORPHONE HYDROCHLORIDE 1 MG/ML
0.2 INJECTION, SOLUTION INTRAMUSCULAR; INTRAVENOUS; SUBCUTANEOUS
Status: DISCONTINUED | OUTPATIENT
Start: 2019-04-23 | End: 2019-04-23 | Stop reason: HOSPADM

## 2019-04-23 RX ORDER — HYDROMORPHONE HYDROCHLORIDE 1 MG/ML
0.5 INJECTION, SOLUTION INTRAMUSCULAR; INTRAVENOUS; SUBCUTANEOUS
Status: DISCONTINUED | OUTPATIENT
Start: 2019-04-23 | End: 2019-04-25 | Stop reason: HOSPADM

## 2019-04-23 RX ORDER — OXYCODONE AND ACETAMINOPHEN 5; 325 MG/1; MG/1
1 TABLET ORAL
Status: DISCONTINUED | OUTPATIENT
Start: 2019-04-23 | End: 2019-04-25 | Stop reason: HOSPADM

## 2019-04-23 RX ORDER — UBIDECARENONE 50 MG
50 CAPSULE ORAL DAILY
Status: DISCONTINUED | OUTPATIENT
Start: 2019-04-24 | End: 2019-04-25 | Stop reason: HOSPADM

## 2019-04-23 RX ORDER — SODIUM CHLORIDE 9 MG/ML
25 INJECTION, SOLUTION INTRAVENOUS CONTINUOUS
Status: DISCONTINUED | OUTPATIENT
Start: 2019-04-23 | End: 2019-04-23 | Stop reason: HOSPADM

## 2019-04-23 RX ORDER — SODIUM CHLORIDE 0.9 % (FLUSH) 0.9 %
5-40 SYRINGE (ML) INJECTION EVERY 8 HOURS
Status: DISCONTINUED | OUTPATIENT
Start: 2019-04-23 | End: 2019-04-25 | Stop reason: HOSPADM

## 2019-04-23 RX ORDER — SUCCINYLCHOLINE CHLORIDE 20 MG/ML
INJECTION INTRAMUSCULAR; INTRAVENOUS AS NEEDED
Status: DISCONTINUED | OUTPATIENT
Start: 2019-04-23 | End: 2019-04-23 | Stop reason: HOSPADM

## 2019-04-23 RX ORDER — ONDANSETRON 2 MG/ML
INJECTION INTRAMUSCULAR; INTRAVENOUS AS NEEDED
Status: DISCONTINUED | OUTPATIENT
Start: 2019-04-23 | End: 2019-04-23 | Stop reason: HOSPADM

## 2019-04-23 RX ORDER — SODIUM CHLORIDE 0.9 % (FLUSH) 0.9 %
5-40 SYRINGE (ML) INJECTION AS NEEDED
Status: DISCONTINUED | OUTPATIENT
Start: 2019-04-23 | End: 2019-04-25 | Stop reason: HOSPADM

## 2019-04-23 RX ORDER — OXYCODONE AND ACETAMINOPHEN 5; 325 MG/1; MG/1
TABLET ORAL
Status: COMPLETED
Start: 2019-04-23 | End: 2019-04-23

## 2019-04-23 RX ORDER — SODIUM CHLORIDE, SODIUM LACTATE, POTASSIUM CHLORIDE, CALCIUM CHLORIDE 600; 310; 30; 20 MG/100ML; MG/100ML; MG/100ML; MG/100ML
25 INJECTION, SOLUTION INTRAVENOUS CONTINUOUS
Status: DISCONTINUED | OUTPATIENT
Start: 2019-04-23 | End: 2019-04-23 | Stop reason: HOSPADM

## 2019-04-23 RX ORDER — ATORVASTATIN CALCIUM 20 MG/1
20 TABLET, FILM COATED ORAL
Status: DISCONTINUED | OUTPATIENT
Start: 2019-04-23 | End: 2019-04-25 | Stop reason: HOSPADM

## 2019-04-23 RX ORDER — METOPROLOL SUCCINATE 50 MG/1
50 TABLET, EXTENDED RELEASE ORAL
Status: DISCONTINUED | OUTPATIENT
Start: 2019-04-23 | End: 2019-04-25 | Stop reason: HOSPADM

## 2019-04-23 RX ORDER — FENTANYL CITRATE 50 UG/ML
INJECTION, SOLUTION INTRAMUSCULAR; INTRAVENOUS AS NEEDED
Status: DISCONTINUED | OUTPATIENT
Start: 2019-04-23 | End: 2019-04-23 | Stop reason: HOSPADM

## 2019-04-23 RX ORDER — AMLODIPINE BESYLATE 5 MG/1
10 TABLET ORAL DAILY
Status: DISCONTINUED | OUTPATIENT
Start: 2019-04-24 | End: 2019-04-25 | Stop reason: HOSPADM

## 2019-04-23 RX ORDER — CALCIUM ACETATE 667 MG/1
1 CAPSULE ORAL
Status: DISCONTINUED | OUTPATIENT
Start: 2019-04-23 | End: 2019-04-25 | Stop reason: HOSPADM

## 2019-04-23 RX ORDER — ROCURONIUM BROMIDE 10 MG/ML
INJECTION, SOLUTION INTRAVENOUS AS NEEDED
Status: DISCONTINUED | OUTPATIENT
Start: 2019-04-23 | End: 2019-04-23 | Stop reason: HOSPADM

## 2019-04-23 RX ORDER — NALOXONE HYDROCHLORIDE 0.4 MG/ML
0.4 INJECTION, SOLUTION INTRAMUSCULAR; INTRAVENOUS; SUBCUTANEOUS AS NEEDED
Status: DISCONTINUED | OUTPATIENT
Start: 2019-04-23 | End: 2019-04-25 | Stop reason: HOSPADM

## 2019-04-23 RX ORDER — DIPHENHYDRAMINE HYDROCHLORIDE 50 MG/ML
12.5 INJECTION, SOLUTION INTRAMUSCULAR; INTRAVENOUS
Status: ACTIVE | OUTPATIENT
Start: 2019-04-23 | End: 2019-04-24

## 2019-04-23 RX ORDER — HYDROMORPHONE HYDROCHLORIDE 2 MG/ML
INJECTION, SOLUTION INTRAMUSCULAR; INTRAVENOUS; SUBCUTANEOUS AS NEEDED
Status: DISCONTINUED | OUTPATIENT
Start: 2019-04-23 | End: 2019-04-23 | Stop reason: HOSPADM

## 2019-04-23 RX ADMIN — ROCURONIUM BROMIDE 10 MG: 10 INJECTION, SOLUTION INTRAVENOUS at 08:29

## 2019-04-23 RX ADMIN — CALCIUM ACETATE 667 MG: 667 CAPSULE ORAL at 16:21

## 2019-04-23 RX ADMIN — ONDANSETRON 4 MG: 2 INJECTION INTRAMUSCULAR; INTRAVENOUS at 11:22

## 2019-04-23 RX ADMIN — GLYCOPYRROLATE 0.5 MG: 0.2 INJECTION INTRAMUSCULAR; INTRAVENOUS at 11:39

## 2019-04-23 RX ADMIN — ACETAMINOPHEN 1000 MG: 10 INJECTION, SOLUTION INTRAVENOUS at 08:50

## 2019-04-23 RX ADMIN — Medication 10 ML: at 14:16

## 2019-04-23 RX ADMIN — NEOSTIGMINE METHYLSULFATE 3 MG: 1 INJECTION INTRAVENOUS at 11:39

## 2019-04-23 RX ADMIN — ROCURONIUM BROMIDE 10 MG: 10 INJECTION, SOLUTION INTRAVENOUS at 07:52

## 2019-04-23 RX ADMIN — LIDOCAINE HYDROCHLORIDE 60 MG: 20 INJECTION, SOLUTION EPIDURAL; INFILTRATION; INTRACAUDAL; PERINEURAL at 07:29

## 2019-04-23 RX ADMIN — ATORVASTATIN CALCIUM 20 MG: 20 TABLET, FILM COATED ORAL at 21:46

## 2019-04-23 RX ADMIN — OXYCODONE HYDROCHLORIDE AND ACETAMINOPHEN 1 TABLET: 5; 325 TABLET ORAL at 13:13

## 2019-04-23 RX ADMIN — GABAPENTIN 100 MG: 100 CAPSULE ORAL at 17:20

## 2019-04-23 RX ADMIN — HYDROMORPHONE HYDROCHLORIDE 0.75 MG: 2 INJECTION, SOLUTION INTRAMUSCULAR; INTRAVENOUS; SUBCUTANEOUS at 10:28

## 2019-04-23 RX ADMIN — ROCURONIUM BROMIDE 10 MG: 10 INJECTION, SOLUTION INTRAVENOUS at 09:00

## 2019-04-23 RX ADMIN — ROCURONIUM BROMIDE 35 MG: 10 INJECTION, SOLUTION INTRAVENOUS at 07:39

## 2019-04-23 RX ADMIN — ROCURONIUM BROMIDE 10 MG: 10 INJECTION, SOLUTION INTRAVENOUS at 09:46

## 2019-04-23 RX ADMIN — Medication 2 G: at 07:40

## 2019-04-23 RX ADMIN — FENTANYL CITRATE 50 MCG: 50 INJECTION, SOLUTION INTRAMUSCULAR; INTRAVENOUS at 07:48

## 2019-04-23 RX ADMIN — MIDAZOLAM HYDROCHLORIDE 2 MG: 1 INJECTION, SOLUTION INTRAMUSCULAR; INTRAVENOUS at 07:22

## 2019-04-23 RX ADMIN — FENTANYL CITRATE 25 MCG: 50 INJECTION, SOLUTION INTRAMUSCULAR; INTRAVENOUS at 07:57

## 2019-04-23 RX ADMIN — Medication 10 ML: at 21:49

## 2019-04-23 RX ADMIN — ROCURONIUM BROMIDE 10 MG: 10 INJECTION, SOLUTION INTRAVENOUS at 10:28

## 2019-04-23 RX ADMIN — METOPROLOL SUCCINATE 50 MG: 50 TABLET, EXTENDED RELEASE ORAL at 21:46

## 2019-04-23 RX ADMIN — DEXAMETHASONE SODIUM PHOSPHATE 4 MG: 4 INJECTION, SOLUTION INTRA-ARTICULAR; INTRALESIONAL; INTRAMUSCULAR; INTRAVENOUS; SOFT TISSUE at 07:29

## 2019-04-23 RX ADMIN — SODIUM CHLORIDE 25 ML/HR: 900 INJECTION, SOLUTION INTRAVENOUS at 06:54

## 2019-04-23 RX ADMIN — ROCURONIUM BROMIDE 10 MG: 10 INJECTION, SOLUTION INTRAVENOUS at 07:57

## 2019-04-23 RX ADMIN — CLONIDINE HYDROCHLORIDE 0.3 MG: 0.1 TABLET ORAL at 17:21

## 2019-04-23 RX ADMIN — OXYCODONE AND ACETAMINOPHEN 1 TABLET: 5; 325 TABLET ORAL at 13:13

## 2019-04-23 RX ADMIN — ROCURONIUM BROMIDE 5 MG: 10 INJECTION, SOLUTION INTRAVENOUS at 07:28

## 2019-04-23 RX ADMIN — PROPOFOL 10 MG: 10 INJECTION, EMULSION INTRAVENOUS at 07:57

## 2019-04-23 RX ADMIN — FENTANYL CITRATE 25 MCG: 50 INJECTION, SOLUTION INTRAMUSCULAR; INTRAVENOUS at 10:38

## 2019-04-23 RX ADMIN — PROPOFOL 120 MG: 10 INJECTION, EMULSION INTRAVENOUS at 07:29

## 2019-04-23 RX ADMIN — SUCCINYLCHOLINE CHLORIDE 140 MG: 20 INJECTION INTRAMUSCULAR; INTRAVENOUS at 07:29

## 2019-04-23 RX ADMIN — Medication 10 ML: at 15:37

## 2019-04-23 NOTE — PROGRESS NOTES
General Surgery End of Shift Nursing Note    Bedside shift change report given to Lisa Broderick (oncoming nurse) by Shantelle Martinez (offgoing nurse). Report included the following information SBAR, Kardex, Intake/Output, MAR and Recent Results. Shift worked:   D   Summary of shift:    0   Issues for physician to address:   0     Number times ambulated in hallway past shift: 0    Number of times OOB to chair past shift: 0    Pain Management:  Current medication: 0  Patient states pain is manageable on current pain medication: YES    GI:    Current diet:  DIET RENAL WITH OPTIONS 80-80-80; Regular    Tolerating current diet: YES  Passing flatus: NO  Last Bowel Movement: yesterday   Appearance: 0    Respiratory:    Incentive Spirometer at bedside: Yes  Patient instructed on use: Yes    Patient Safety:    Falls Score: 1  Bed Alarm On? No  Sitter?  No    Nancy Main RN

## 2019-04-23 NOTE — BRIEF OP NOTE
BRIEF OPERATIVE NOTE    Date of Procedure: 4/23/2019   Preoperative Diagnosis: ESRD  Postoperative Diagnosis: ESRD    Procedure(s):  CREATION TRANSPOSED ARTERIO VENOUS FISTULA LEFT ARM (brachio-basilic)  Surgeon(s) and Role:     Anna Guthrie MD - Primary         Surgical Assistant: staff    Surgical Staff:  Circ-1: Tennie Phoenix, RN  Circ-Relief: Karrie Torre RN  Scrub Tech-1: Sanam Delong  Scrub RN-Relief: Karrie Torre RN  Surg Asst-1: Marlon Shandra  Event Time In Time Out   Incision Start 8915    Incision Close 1158      Anesthesia: General   Estimated Blood Loss: 50 ml  Specimens: * No specimens in log *   Findings: Palpable thrill in AV fistula and palpable pulse in radial artery at the wrist post op.    Complications: none  Implants: * No implants in log *

## 2019-04-23 NOTE — ANESTHESIA POSTPROCEDURE EVALUATION
Procedure(s): CREATION TRANSPOSED ARTERIO VENOUS FISTULA LEFT ARM. general 
 
Anesthesia Post Evaluation Patient location during evaluation: PACU Note status: Adequate. Level of consciousness: responsive to verbal stimuli and sleepy but conscious Pain management: satisfactory to patient Airway patency: patent Anesthetic complications: no 
Cardiovascular status: acceptable Respiratory status: acceptable Hydration status: acceptable Comments: +Post-Anesthesia Evaluation and Assessment Patient: Curly Little MRN: [id number]  SSN: [id number] YOB: 1960  Age: 60 y.o. Sex: male Cardiovascular Function/Vital Signs /65 (BP 1 Location: Right arm, BP Patient Position: At rest)   Pulse 65   Temp 37.3 °C (99.2 °F)   Resp 14   Ht 5' 5\" (1.651 m)   Wt 74.7 kg (164 lb 10.9 oz)   SpO2 98%   BMI 27.40 kg/m² Patient is status post Procedure(s): CREATION TRANSPOSED ARTERIO VENOUS FISTULA LEFT ARM. Nausea/Vomiting: Controlled. Postoperative hydration reviewed and adequate. Pain: 
Pain Scale 1: FLACC (04/23/19 1245) Pain Intensity 1: 0 (04/23/19 1230) Managed. Neurological Status:  
Neuro (WDL): Exceptions to WDL (04/23/19 1212) At baseline. Mental Status and Level of Consciousness: Arousable. Pulmonary Status:  
O2 Device: Nasal cannula (04/23/19 1245) Adequate oxygenation and airway patent. Complications related to anesthesia: None Post-anesthesia assessment completed. No concerns. Signed By: Kelly Yip MD  
 4/23/2019 Post anesthesia nausea and vomiting:  controlled Vitals Value Taken Time /66 4/23/2019  1:00 PM  
Temp 37.3 °C (99.2 °F) 4/23/2019 12:16 PM  
Pulse 66 4/23/2019  1:01 PM  
Resp 18 4/23/2019  1:01 PM  
SpO2 98 % 4/23/2019  1:01 PM  
Vitals shown include unvalidated device data.

## 2019-04-23 NOTE — PERIOP NOTES
Bedside shift change report given to Ella Heard (oncoming nurse) by Bebeto Gutierrez (offgoing nurse). Report included the following information SBAR.

## 2019-04-23 NOTE — PERIOP NOTES
Handoff Report from Operating Room to PACU    Report received from VANDANA Navarrete RN and Karlynn Brittle, CRNA regarding [employer]. Surgeon(s):  Sarthak James MD  And Procedure(s) (LRB):  CREATION TRANSPOSED ARTERIO VENOUS FISTULA LEFT ARM (Left)  confirmed with allergies, drains and dressings discussed. Anesthesia type, drugs, patient history, complications, estimated blood loss, vital signs, intake and output, and last pain medication, lines, reversal medications and temperature were reviewed.

## 2019-04-23 NOTE — H&P
Surgery History and Physical    Subjective:      Curly Little is a 60 y.o. male     The patient is status post creation AV fistula in the left arm on 1/15/19. He has no complaints. The patient is dialyzed by way of a left sided central vein catheter. Nephrologist - Dr Amy Reynoso    The patient denies anginal chest pain or dyspnea. Past Medical History:   Diagnosis Date    Chronic kidney disease     dialysis/  [facility] m-w-f    Fatigue     Gout     Hypercholesterolemia     Hypertension     Sleep apnea     cpap    Snoring     Stroke [facility])     TIA, stroke ,      Past Surgical History:   Procedure Laterality Date    HX HEENT      tonsillectomy in     HX HEENT      nasal polypectomy    HX NEPHRECTOMY      kidney removed    HX VASCULAR ACCESS Left     neck for dialysis    VASCULAR SURGERY PROCEDURE UNLIST  01/15/2019    Creation of AV Fistula Left Arm      Family History   Problem Relation Age of Onset    Heart Disease Mother     No Known Problems Father     Stroke Brother      Social History     Tobacco Use    Smoking status: Former Smoker     Packs/day: 1.50     Years: 40.00     Pack years: 60.00     Last attempt to quit: 10/10/2012     Years since quittin.5    Smokeless tobacco: Never Used   Substance Use Topics    Alcohol use: No      Prior to Admission medications    Medication Sig Start Date End Date Taking? Authorizing Provider   metoprolol succinate (TOPROL XL) 100 mg tablet Take 0.5 Tabs by mouth daily. 19  Yes Ursula Calle MD   ferrous sulfate 325 mg (65 mg iron) tablet Take 1 Tab by mouth Daily (before breakfast). 19  Yes Ursula Calle MD   folic acid (FOLVITE) 1 mg tablet Take 1 Tab by mouth daily. 19  Yes Vaibhav Hillman MD   rosuvastatin (CRESTOR) 10 mg tablet Take 1 Tab by mouth nightly.  19  Yes Ursula Calle MD   clopidogrel (PLAVIX) 75 mg tab TAKE 1 TABLET BY MOUTH EVERY DAY 19  Yes Ursula Calle MD amLODIPine (NORVASC) 10 mg tablet Take 10 mg by mouth daily. Yes Provider, Historical   calcium acetate (PHOSLO) 667 mg cap Take  by mouth three (3) times daily (with meals). Yes Provider, Historical   cloNIDine HCl (CATAPRES) 0.3 mg tablet Take 1 Tab by mouth two (2) times a day. 18  Yes Skiff, Nicole Michaels, NP   ubidecarenone (CO Q-10 PO) Take  by mouth daily. Yes Provider, Historical   vitamin e (AQUA GEMS) 200 unit capsule Take  by mouth daily. Yes Provider, Historical   cholecalciferol, VITAMIN D3, (VITAMIN D3) 5,000 unit tab tablet Take  by mouth daily. Yes Provider, Historical   aspirin 81 mg chewable tablet Take 81 mg by mouth daily. Yes Provider, Historical   OTHER AFO  Right foot drop 3/26/19   Vaibhav Hillman MD   HYDROcodone-acetaminophen (NORCO) 5-325 mg per tablet Take 1 Tab by mouth every four (4) hours as needed for Pain. Max Daily Amount: 6 Tabs. 1/15/19   Ashtyn Douglas MD      Allergies   Allergen Reactions    Chlorhexidine Itching       Review of Systems:  Pertinent items are noted in the History of Present Illness. Objective:        Patient Vitals for the past 8 hrs:   BP Temp Pulse Resp SpO2 Height Weight   19 0628 146/81 98.1 °F (36.7 °C) 64 20 100 % 5' 5\" (1.651 m) 74.7 kg (164 lb 10.9 oz)       Temp (24hrs), Av.1 °F (36.7 °C), Min:98.1 °F (36.7 °C), Max:98.1 °F (36.7 °C)      Physical Exam:  WD man,  NAD  HEAD/NECK: No jaundice, mass or thyromegaly. Left central catheter. LUNGS: Clear bilaterally without wheeze, rhonchi or rales. Peggyann Gang HEART: Regular without murmur, gallop or rub. Abdomen:  Soft, non tender, no mass or hernia noted. NEURO: Patient is alert and oriented x 3 and moves all extremities equally. Facial movement is symmetrical. Speech was normal.   EXT: Palpable left radial pulse. Palpable thrill in AV fistula left arm. Assessment:     ESRD    Plan:     Creation of transposed AV fistula left arm.

## 2019-04-23 NOTE — OP NOTES
Operative Report    CPT       67785        Creation of Transposed AV Fistula left Arm  (Brachio - Basilic)          Patient Name:  Dell Helms    4/23/2019    Preoperative Diagnosis - ESRD    Postoperative Diagnosis - Same    Anesthesia - General    Surgeon - VERONIKA Collins    Procedure - Creation of transposed AV fistula left arm    Operative Summary -     The patient was taken to the operating room and placed on the operating table in the supine position. The patient's left arm was  prepared and steriley draped. The location of the veins had been reconfirmed by ultrasound prior to surgery. There was an existing AV fistula in the arm between the proximal radial  artery and the deep tributary to median antebrachial vein. An incision was made exposing the vein just above the arterial anastomosis. The basilic vein was exposed with a long separate incision up to the level of the pectoralis muscle. The branches were ligated and divided. The vein was then ligated above the arterial anastomosis and divided. The basilic vein was then gently distended with heparinized lactated Ringer's solution and marked for orientation. The brachial artery was exposed just above the antecubital   level. The vein was brought down through a very superficial tunnel in the upper arm with a curved [place] tunneler. The tunneler sheath was withdrawn. The artery was controlled proximally and distally with Yamilet Claudy bulldog clamps. A short longitudinal arteriotomy was made. Using 6 - 0 prolene an anastomosis was made between the end of the vein and the side of the artery. Prior to completion, the artery was flushed proximally and distally. After completion, with initiation of flow, there was a thrill at the AV anastomosis and a palpable pulse in the radial artery at the wrist.      The wound was irrigated with antibiotic solution.   In the bed of the mobilized basilic vein, a 7 mm LIZZ drain was placed and was brought out through a separate stab wound. The subcutaneous tissue was  closed with 3-0 chromic and the skin with surgical staples. A gauze dressing was applied with kerlix wrap. The patient tolerated the procedure well and was taken to the PACU in stable condition.      Specimen - none     Drain - Jake - Aviles 7 mm    Estimated Blood Loss - 50 ml    Complications - none        LIAM Butcher MD

## 2019-04-23 NOTE — ANESTHESIA PREPROCEDURE EVALUATION
Anesthetic History No history of anesthetic complications Review of Systems / Medical History Patient summary reviewed, nursing notes reviewed and pertinent labs reviewed Pulmonary Sleep apnea: CPAP Comments: Smoking Status: Former Smoker - 60 pack years Quit Smoking: 10/10/12 Neuro/Psych CVA TIA Cardiovascular Hypertension Hyperlipidemia Exercise tolerance: >4 METS 
  
GI/Hepatic/Renal 
  
 
 
Renal disease: ESRD Endo/Other Other Findings Physical Exam 
 
Airway Mallampati: III 
TM Distance: 4 - 6 cm Neck ROM: normal range of motion Mouth opening: Normal 
 
 Cardiovascular Regular rate and rhythm,  S1 and S2 normal,  no murmur, click, rub, or gallop Rhythm: regular Rate: normal 
 
 
 
 Dental 
No notable dental hx Pulmonary Breath sounds clear to auscultation Abdominal 
GI exam deferred Other Findings Anesthetic Plan ASA: 3 Anesthesia type: general 
 
Monitoring Plan: BIS Induction: Intravenous Anesthetic plan and risks discussed with: Patient

## 2019-04-23 NOTE — PERIOP NOTES
TRANSFER - OUT REPORT:    Verbal report given to Candis Antonio (name) on Gabi Ortiz  being transferred to 2119 (unit) for routine progression of care       Report consisted of patients Situation, Background, Assessment and   Recommendations(SBAR). Information from the following report(s) SBAR, OR Summary, Intake/Output, MAR and Cardiac Rhythm NSR was reviewed with the receiving nurse. Opportunity for questions and clarification was provided.       Patient transported with:   O2 @ 2 liters  Tech

## 2019-04-23 NOTE — PERIOP NOTES
Bedside and Verbal shift change report given to TEODORA Sevilla RN (oncoming nurse) by Rosas Christy. Silvana Whitehead RN (offgoing nurse). Report included the following information SBAR, Kardex, Procedure Summary, Intake/Output, MAR, Accordion and Cardiac Rhythm sinus rhythm. An opportunity for questions and clarification was provided.

## 2019-04-24 VITALS
WEIGHT: 164.68 LBS | SYSTOLIC BLOOD PRESSURE: 126 MMHG | HEIGHT: 65 IN | TEMPERATURE: 97.9 F | DIASTOLIC BLOOD PRESSURE: 62 MMHG | OXYGEN SATURATION: 100 % | RESPIRATION RATE: 18 BRPM | BODY MASS INDEX: 27.44 KG/M2 | HEART RATE: 64 BPM

## 2019-04-24 LAB
ANION GAP SERPL CALC-SCNC: 7 MMOL/L (ref 5–15)
BASOPHILS # BLD: 0 K/UL (ref 0–0.1)
BASOPHILS NFR BLD: 1 % (ref 0–1)
BUN SERPL-MCNC: 55 MG/DL (ref 6–20)
BUN/CREAT SERPL: 9 (ref 12–20)
CALCIUM SERPL-MCNC: 8 MG/DL (ref 8.5–10.1)
CHLORIDE SERPL-SCNC: 99 MMOL/L (ref 97–108)
CO2 SERPL-SCNC: 30 MMOL/L (ref 21–32)
CREAT SERPL-MCNC: 6.46 MG/DL (ref 0.7–1.3)
DIFFERENTIAL METHOD BLD: ABNORMAL
EOSINOPHIL # BLD: 0.1 K/UL (ref 0–0.4)
EOSINOPHIL NFR BLD: 2 % (ref 0–7)
ERYTHROCYTE [DISTWIDTH] IN BLOOD BY AUTOMATED COUNT: 14.4 % (ref 11.5–14.5)
GLUCOSE SERPL-MCNC: 159 MG/DL (ref 65–100)
HCT VFR BLD AUTO: 32.8 % (ref 36.6–50.3)
HGB BLD-MCNC: 10.3 G/DL (ref 12.1–17)
IMM GRANULOCYTES # BLD AUTO: 0 K/UL (ref 0–0.04)
IMM GRANULOCYTES NFR BLD AUTO: 0 % (ref 0–0.5)
LYMPHOCYTES # BLD: 1.1 K/UL (ref 0.8–3.5)
LYMPHOCYTES NFR BLD: 18 % (ref 12–49)
MCH RBC QN AUTO: 25.8 PG (ref 26–34)
MCHC RBC AUTO-ENTMCNC: 31.4 G/DL (ref 30–36.5)
MCV RBC AUTO: 82 FL (ref 80–99)
MONOCYTES # BLD: 0.6 K/UL (ref 0–1)
MONOCYTES NFR BLD: 10 % (ref 5–13)
NEUTS SEG # BLD: 4.3 K/UL (ref 1.8–8)
NEUTS SEG NFR BLD: 69 % (ref 32–75)
NRBC # BLD: 0 K/UL (ref 0–0.01)
NRBC BLD-RTO: 0 PER 100 WBC
PLATELET # BLD AUTO: 194 K/UL (ref 150–400)
PMV BLD AUTO: 9.6 FL (ref 8.9–12.9)
POTASSIUM SERPL-SCNC: 3.4 MMOL/L (ref 3.5–5.1)
RBC # BLD AUTO: 4 M/UL (ref 4.1–5.7)
SODIUM SERPL-SCNC: 136 MMOL/L (ref 136–145)
WBC # BLD AUTO: 6.2 K/UL (ref 4.1–11.1)

## 2019-04-24 PROCEDURE — 74011250637 HC RX REV CODE- 250/637: Performed by: SURGERY

## 2019-04-24 PROCEDURE — 94760 N-INVAS EAR/PLS OXIMETRY 1: CPT

## 2019-04-24 PROCEDURE — 36415 COLL VENOUS BLD VENIPUNCTURE: CPT

## 2019-04-24 PROCEDURE — 99218 HC RM OBSERVATION: CPT

## 2019-04-24 PROCEDURE — 80048 BASIC METABOLIC PNL TOTAL CA: CPT

## 2019-04-24 PROCEDURE — 77010033678 HC OXYGEN DAILY

## 2019-04-24 PROCEDURE — 85025 COMPLETE CBC W/AUTO DIFF WBC: CPT

## 2019-04-24 PROCEDURE — 90935 HEMODIALYSIS ONE EVALUATION: CPT

## 2019-04-24 RX ORDER — HYDROCODONE BITARTRATE AND ACETAMINOPHEN 5; 325 MG/1; MG/1
1 TABLET ORAL
Qty: 15 TAB | Refills: 0 | Status: SHIPPED | OUTPATIENT
Start: 2019-04-24 | End: 2019-04-29

## 2019-04-24 RX ADMIN — Medication 10 ML: at 05:09

## 2019-04-24 RX ADMIN — Medication 50 MG: at 09:54

## 2019-04-24 RX ADMIN — CALCIUM ACETATE 667 MG: 667 CAPSULE ORAL at 09:54

## 2019-04-24 RX ADMIN — AMLODIPINE BESYLATE 10 MG: 5 TABLET ORAL at 09:54

## 2019-04-24 RX ADMIN — GABAPENTIN 100 MG: 100 CAPSULE ORAL at 09:54

## 2019-04-24 RX ADMIN — CALCIUM ACETATE 667 MG: 667 CAPSULE ORAL at 13:03

## 2019-04-24 RX ADMIN — CLONIDINE HYDROCHLORIDE 0.3 MG: 0.1 TABLET ORAL at 09:54

## 2019-04-24 NOTE — PROGRESS NOTES
Surgery    Mild arm pain. Afebrile, VSS. Alert. Good bruit in AV fistula left arm, palpable radial pulse. For dialysis today, then discharge. Follow up in 7 days in the office. Keep LIZZ in place.     Radha Trujillo MD

## 2019-04-24 NOTE — PROGRESS NOTES
CM met with pt and wife to discuss d/c plan. Patient and wife stated that they are very comfortable with emptying the LIZZ and don't have any needs or concerns. CM provided pt/wife with [address] [place] [address] but encouraged pt/wife to call MD if there are any issues with LIZZ/surgical site.   Follow-up appointment is on the AVS.    Gracie Ayala  Ext [phone]

## 2019-04-24 NOTE — PROGRESS NOTES
Oral and Written notification given to patient and/or caregiver informing them that they are currently an Outpatient receiving care in our facility. Outpatient services include Observation Services.                      Lio Mazariegos  [phone]

## 2019-04-24 NOTE — PROCEDURES
[facility] Dialysis Team [facility]  [phone]    Vitals   Pre   Post   Assessment   Pre   Post     Temp  Temp: 98.3 °F (36.8 °C) (04/24/19 1500)  98.0  LOC  A & O x 4, following commands  A & O x 4, following commands    HR   Pulse (Heart Rate): (!) 57 (04/24/19 1545) 59 Lungs   Clear  Clear   B/P   BP: 126/74 (04/24/19 1545) 137/75   Cardiac   RRR, pulses palpable   RRR, pulses palpable    Resp   Resp Rate: 17 (04/24/19 1545) 18 Skin   Clean, dry, surgical dressing and drain in left arm, fistula revision    Clean, dry, surgical dressing and drain in left arm, fistula revision      Pain level  Pain Intensity 1: 0 (04/24/19 0304) 0   Edema  +1 on left arm, generalized on legs and face      +1 on left arm, generalized on legs and face    Orders:    Duration:   Start:    1500 End:    1800 Total:   3 hours   Dialyzer:   Dialyzer/Set Up Inspection: Revaclear (04/24/19 1500)   K Bath:   Dialysate K (mEq/L): 3 (04/24/19 1500)   Ca Bath:   Dialysate CA (mEq/L): 2.5 (04/24/19 1500)   Na/Bicarb:   Dialysate NA (mEq/L): 140 (04/24/19 1500)   Target Fluid Removal:   Goal/Amount of Fluid to Remove (mL): 1500 mL (04/24/19 1500)   Access     Type & Location:   HALEY AV Fistula, no s/s of infection, surgical dressing and drain   Left CVC, no s/s of infection, dressing changed by me.     Labs     Obtained/Reviewed   Critical Results Called   Date when labs were drawn-  Hgb-    HGB   Date Value Ref Range Status   04/24/2019 10.3 (L) 12.1 - 17.0 g/dL Final     K-    Potassium   Date Value Ref Range Status   04/24/2019 3.4 (L) 3.5 - 5.1 mmol/L Final     Ca-   Calcium   Date Value Ref Range Status   04/24/2019 8.0 (L) 8.5 - 10.1 MG/DL Final     Bun-   BUN   Date Value Ref Range Status   04/24/2019 55 (H) 6 - 20 MG/DL Final     Creat-   Creatinine   Date Value Ref Range Status   04/24/2019 6.46 (H) 0.70 - 1.30 MG/DL Final        Medications/ Blood Products Given     Name   Dose   Route and Time     None                 Blood Volume Processed (BVP):    57.0 L Net Fluid   Removed:  1500 ml    Comments   Time Out Done: 4801  Primary Nurse Rpt Pre: Antonia Croft RN  Primary Nurse Rpt Post: Antonia Croft RN  Pt Education: access care   Care Plan: continue with dialysis per physician's orders  Tx Summary: pt tolerated treatment well. Left tunneled CVC, no s/s of infection, changed dressing as old one was soiled and coming off. Each catheter limb disinfected for 60 seconds per limb with alcohol swabs. Caps removed, dialysis CVC hub scrubbed with Prevantics for 5 seconds, followed by a 5 second dry time per Hospital P&P. Aspirated and flushed each port with 10 ml of NS. Pump speed at 300. Blood pressure stable. No meds given during treatment. Wilver Gain cloteed half way through treatment. Changed set up and re-started treatment   All possible blood returned from circuit. Each catheter limb disinfected for 60 seconds per limb with alcohol swabs. Dialysis CVC hubs scrubbed with Prevantics for 5 seconds, followed by a 5 second dry time per Hospital P&P, Each port flushed with 10 NS. And red and blue dialysis caps applied to ports. Admiting Diagnosis: revision of fistula HALEY  Pt's previous clinic-  Consent signed - Informed Consent Verified: Yes (04/24/19 1500)  Francisco Consent - Yes signed  Hepatitis Status- Neg 4/23/19   Machine #- Machine Number: [id number] (04/24/19 1500)  Telemetry status- None   Pre-dialysis wt. -

## 2019-04-24 NOTE — CONSULTS
[facility] Consult Note        NAME: Rachel Palma       :  1960       MRN:  [id number]     Date/Time: 2019    Risk of deterioration: low       Assessment:    Plan:  ESRD  S/P AVF  ANemia HTN Stable for dc after hd today  Pt getting transplant workup at [address] [place] [address]. F  HD orders entered     Asked to see for esrd needs. He is s/p avf placement. Pt dialyzes with dr. Pao Araujo at [address]. Subjective:     Chief Complaint:  none    Review of Systems: no n/v/cp/sob    Objective:     VITALS:   Last 24hrs VS reviewed since prior progress note. Most recent are:  Visit Vitals  /82 (BP 1 Location: Right arm, BP Patient Position: Post activity; Sitting)   Pulse 60   Temp 97.6 °F (36.4 °C)   Resp 18   Ht 5' 5\" (1.651 m)   Wt 74.7 kg (164 lb 10.9 oz)   SpO2 99%   BMI 27.40 kg/m²     SpO2 Readings from Last 6 Encounters:   19 99%   19 100%   19 99%   19 99%   19 98%   19 100%    O2 Flow Rate (L/min): 2 l/min       Intake/Output Summary (Last 24 hours) at 2019 1328  Last data filed at 2019 0900  Gross per 24 hour   Intake 120 ml   Output 845 ml   Net -725 ml        Telemetry Reviewed      PHYSICAL EXAM:    General   well developed, well nourished, appears stated age, in no acute distress  Cardiology  Regular Rate and Rythmn   Extremities  No clubbing, cyanosis, or edema. Pulses intact.  (L) arm bandage c/d/i with drain             Lab Data Reviewed: (see below)    Medications Reviewed: (see below)    PMH/SH reviewed - no change compared to H&P  _____________________________________________  ___________________________________________________    Attending Physician: Jaci Graham MD     ____________________________________________________  MEDICATIONS:  Current Facility-Administered Medications   Medication Dose Route Frequency    amLODIPine (NORVASC) tablet 10 mg  10 mg Oral DAILY    calcium acetate (PHOSLO) capsule 667 mg  1 Cap Oral TID WITH MEALS    cloNIDine HCl (CATAPRES) tablet 0.3 mg  0.3 mg Oral BID    metoprolol succinate (TOPROL-XL) XL tablet 50 mg  50 mg Oral QHS    co-enzyme Q-10 (CO Q-10) capsule 50 mg  50 mg Oral DAILY    sodium chloride (NS) flush 5-40 mL  5-40 mL IntraVENous Q8H    sodium chloride (NS) flush 5-40 mL  5-40 mL IntraVENous PRN    acetaminophen (TYLENOL) tablet 650 mg  650 mg Oral Q6H PRN    oxyCODONE-acetaminophen (PERCOCET) 5-325 mg per tablet 1 Tab  1 Tab Oral Q4H PRN    HYDROmorphone (PF) (DILAUDID) injection 0.5 mg  0.5 mg IntraVENous Q4H PRN    naloxone (NARCAN) injection 0.4 mg  0.4 mg IntraVENous PRN    ondansetron (ZOFRAN) injection 4 mg  4 mg IntraVENous Q4H PRN    diphenhydrAMINE (BENADRYL) injection 12.5 mg  12.5 mg IntraVENous ONCE PRN    gabapentin (NEURONTIN) capsule 100 mg  100 mg Oral BID    atorvastatin (LIPITOR) tablet 20 mg  20 mg Oral QHS        LABS:  No results for input(s): WBC, HGB, HCT, PLT, HGBEXT, HCTEXT, PLTEXT in the last 72 hours. No results for input(s): NA, K, CL, CO2, BUN, CREA, GLU, CA, MG, PHOS, URICA, PTH, XPTHCT, PTHN, XPTHNT in the last 72 hours. No lab exists for component: IPTH  No results for input(s): SGOT, GPT, ALT, AP, TBIL, TBILI, TP, ALB, GLOB, GGT, AML, LPSE in the last 72 hours. No lab exists for component: AMYP, HLPSE  No results for input(s): INR, PTP, APTT in the last 72 hours. No lab exists for component: INREXT   No results for input(s): FE, TIBC, PSAT, FERR in the last 72 hours. No results for input(s): PH, PCO2, PO2 in the last 72 hours. No results for input(s): CPK, CKNDX, TROIQ in the last 72 hours.     No lab exists for component: CPKMB  Lab Results   Component Value Date/Time    Glucose (POC) 148 (H) 04/23/2019 06:50 AM    Glucose (POC) 134 (H) 01/15/2019 06:57 AM

## 2019-04-24 NOTE — PROGRESS NOTES
Progress Note    Patient: Micheline Roman MRN: [id number]  SSN: [id number]    YOB: 1960  Age: 60 y.o. Sex: male      Admit Date: 4/23/2019    LOS: 0 days     Subjective:   Pt in bed sitting on side of the bed, wife at bedside. Pt informed that they will be goung home with LIZZ drain, per Dr Alessandro Akers and to follow up at appt   Pt received crutch that was brought from home and that he uses to ambulate,   Wound care done per order, dressing changed and instructed pt and family how to care for at home   1500 Pt to dialysis wife in room waiting/ will DC after return   1900 pt back from dialysis     Objective:     Vitals:    04/24/19 0021 04/24/19 0419 04/24/19 0805 04/24/19 1119   BP: 125/67 128/68 126/75 142/82   Pulse: 62 (!) 59 62 60   Resp: 18 18 18 18   Temp: 97.7 °F (36.5 °C) 97.5 °F (36.4 °C) 97.5 °F (36.4 °C) 97.6 °F (36.4 °C)   SpO2: 98% 100% 97% 99%   Weight:       Height:            Intake and Output:  Current Shift: 04/24 0701 - 04/24 1900  In: 120 [P.O.:120]  Out: 210 [Urine:200; Drains:10]  Last three shifts: 04/22 1901 - 04/24 0700  In: 250 [I.V.:250]  Out: 710 [Urine:600; Drains:35]    Physical Exam:   GENERAL: alert, cooperative, no distress, appears stated age    Lab/Data Review: All lab results for the last 24 hours reviewed.          Assessment:     Active Problems:    ESRD (end stage renal disease) ([facility] (4/23/2019)        Plan:     DC today after dialysis, time TBD   Home with LIZZ drain and wound care education     Signed By: Edith Hanna RN     April 24, 2019

## 2019-05-01 ENCOUNTER — OFFICE VISIT (OUTPATIENT)
Dept: SURGERY | Age: 59
End: 2019-05-01

## 2019-05-01 VITALS
WEIGHT: 162.6 LBS | OXYGEN SATURATION: 100 % | DIASTOLIC BLOOD PRESSURE: 68 MMHG | HEIGHT: 65 IN | HEART RATE: 62 BPM | RESPIRATION RATE: 18 BRPM | BODY MASS INDEX: 27.09 KG/M2 | SYSTOLIC BLOOD PRESSURE: 110 MMHG | TEMPERATURE: 98.5 F

## 2019-05-01 DIAGNOSIS — Z09 POSTOPERATIVE EXAMINATION: Primary | ICD-10-CM

## 2019-05-01 NOTE — PROGRESS NOTES
Chief Complaint   Patient presents with    Surgical Follow-up     Post?op TAVF left arm on 4/23/19. 1. Have you been to the ER, urgent care clinic since your last visit?no  Hospitalized since your last visit? yes    2. Have you seen or consulted any other health care providers outside of the [address] [place] [address] since your last visit?no  Include any pap smears or colon screening. No    Discussed advanced directive. Patient states that he does not have an advanced directive.

## 2019-05-01 NOTE — PROGRESS NOTES
Surgery    The patient is s/p creation of transposed AV fistula left arm.  4/23/2019 has no complaints. On examination, there is a palpable thrill in the AV fistula. There is a palpable  radial pulse at the wrist.  Arm edema is moderate. LIZZ drain output has been small and LIZZ drain was removed. Dry dressing applied. The patient is to remove the dressing in 24 hours. The patient will follow up in 2 weeks.     Hilda Rachel MD      CC: Dr Ronaldo Maradiaga

## 2019-05-15 ENCOUNTER — OFFICE VISIT (OUTPATIENT)
Dept: SURGERY | Age: 59
End: 2019-05-15

## 2019-05-15 VITALS
HEART RATE: 65 BPM | WEIGHT: 163 LBS | OXYGEN SATURATION: 100 % | RESPIRATION RATE: 20 BRPM | TEMPERATURE: 98.4 F | DIASTOLIC BLOOD PRESSURE: 70 MMHG | BODY MASS INDEX: 27.16 KG/M2 | SYSTOLIC BLOOD PRESSURE: 121 MMHG | HEIGHT: 65 IN

## 2019-05-15 DIAGNOSIS — Z09 POSTOPERATIVE EXAMINATION: Primary | ICD-10-CM

## 2019-05-15 NOTE — PROGRESS NOTES
Chief Complaint   Patient presents with    End Stage Renal Disease     S/P Creation of Transposed AV Fistula left Arm 04/23/2019     1. Have you been to the ER, urgent care clinic since your last visit? Hospitalized since your last visit? No    2. Have you seen or consulted any other health care providers outside of the [address] [place] [address] since your last visit? Include any pap smears or colon screening.  No

## 2019-05-15 NOTE — PROGRESS NOTES
Surgery    The patient is s/p creation of transposed AV fistula left arm. No complaints are voiced. On examination, there is a palpable thrill in the AV fistula. There is arm edema. The incision is doing well. Every other staple was removed. I anticipate that the arm edema will slowly subside. The patient will follow up in 1 week with one of my associates for removal of the rest of the staples. I will see the patient again the following week.         Sugar Kc MD

## 2019-05-17 ENCOUNTER — OFFICE VISIT (OUTPATIENT)
Dept: INTERNAL MEDICINE CLINIC | Facility: CLINIC | Age: 59
End: 2019-05-17

## 2019-05-17 VITALS
SYSTOLIC BLOOD PRESSURE: 111 MMHG | RESPIRATION RATE: 16 BRPM | TEMPERATURE: 98 F | BODY MASS INDEX: 26.66 KG/M2 | HEIGHT: 65 IN | DIASTOLIC BLOOD PRESSURE: 67 MMHG | WEIGHT: 160 LBS | HEART RATE: 72 BPM

## 2019-05-17 DIAGNOSIS — R73.03 PREDIABETES: ICD-10-CM

## 2019-05-17 DIAGNOSIS — N18.6 ESRD (END STAGE RENAL DISEASE) (HCC): Primary | ICD-10-CM

## 2019-05-17 DIAGNOSIS — E78.5 HYPERLIPIDEMIA LDL GOAL <100: ICD-10-CM

## 2019-05-17 DIAGNOSIS — G47.30 SLEEP APNEA, UNSPECIFIED TYPE: ICD-10-CM

## 2019-05-17 DIAGNOSIS — Z86.73 HISTORY OF STROKE: ICD-10-CM

## 2019-05-17 DIAGNOSIS — I10 ESSENTIAL HYPERTENSION: ICD-10-CM

## 2019-05-17 RX ORDER — METOPROLOL SUCCINATE 100 MG/1
100 TABLET, EXTENDED RELEASE ORAL DAILY
Qty: 90 TAB | Refills: 1 | Status: SHIPPED | OUTPATIENT
Start: 2019-05-17 | End: 2019-12-02 | Stop reason: SDUPTHER

## 2019-05-17 NOTE — PROGRESS NOTES
Subjective:      Marsha Luther is a 60 y.o. male who presents today for   Chief Complaint   Patient presents with    Surgical Follow-up     He has a past med hx of BRISSA, ESRD, Hyperlipidemia, Hx CVA, iron deficiency anemia, Gout, Right nephrectomy, hTN        AV fistula: Currently undergoing dialysis, s/p surgery for AV fistula. He  sees Dr. Yamilet Carrion in vascular. He has a follow up on May 29    ESRD: He has dialysis 3 times per week. He denies weakness mostly but sometimes after dialysis feels weak diaphoretic and bp drops. He sees Dr. Whit Gordon    Hypertension- compliant with meds  Needs refill of lopressor    hyperlipidemia - patient is on statin            Patient Active Problem List    Diagnosis Date Noted    ESRD (end stage renal disease) ([facility] 04/23/2019    Chest pain 10/10/2018    Preoperative clearance 10/10/2018    CKD (chronic kidney disease) stage 5, GFR less than 15 ml/min ([facility] 10/02/2018    ESRF (end stage renal failure) ([facility]for creation of AV fistula 09/26/2018    Iron deficiency anemia 04/27/2018    Acute gout 04/27/2018    Overweight (BMI 25.0-29.9) 03/21/2018    Essential hypertension 11/17/2017    History of nephrectomy 11/17/2017    Hyperlipidemia LDL goal <100 11/17/2017    History of stroke 11/17/2017    Sleep apnea 11/17/2017     Current Outpatient Medications   Medication Sig Dispense Refill    metoprolol succinate (TOPROL XL) 100 mg tablet Take 0.5 Tabs by mouth daily. 90 Tab 1    ferrous sulfate 325 mg (65 mg iron) tablet Take 1 Tab by mouth Daily (before breakfast). 90 Tab 0    OTHER AFO  Right foot drop 1 Units 0    rosuvastatin (CRESTOR) 10 mg tablet Take 1 Tab by mouth nightly. 90 Tab 1    clopidogrel (PLAVIX) 75 mg tab TAKE 1 TABLET BY MOUTH EVERY DAY 90 Tab 1    amLODIPine (NORVASC) 10 mg tablet Take 10 mg by mouth daily.  calcium acetate (PHOSLO) 667 mg cap Take  by mouth three (3) times daily (with meals).       cloNIDine HCl (CATAPRES) 0.3 mg tablet Take 1 Tab by mouth two (2) times a day. 60 Tab 2    ubidecarenone (CO Q-10 PO) Take  by mouth daily.  vitamin e (AQUA GEMS) 200 unit capsule Take  by mouth daily.  cholecalciferol, VITAMIN D3, (VITAMIN D3) 5,000 unit tab tablet Take  by mouth daily.  aspirin 81 mg chewable tablet Take 81 mg by mouth daily.  folic acid (FOLVITE) 1 mg tablet Take 1 Tab by mouth daily. 30 Tab 1     Allergies   Allergen Reactions    Chlorhexidine Itching     Past Medical History:   Diagnosis Date    Chronic kidney disease     dialysis/  [facility] -w-f    Fatigue     Gout     Hypercholesterolemia     Hypertension     Sleep apnea     cpap    Snoring     Stroke ([facility]     TIA, stroke ,      Past Surgical History:   Procedure Laterality Date    HX HEENT      tonsillectomy in     HX HEENT      nasal polypectomy    HX NEPHRECTOMY      kidney removed    HX VASCULAR ACCESS Left     neck for dialysis    VASCULAR SURGERY PROCEDURE UNLIST  01/15/2019    Creation of AV Fistula Left Arm    VASCULAR SURGERY PROCEDURE UNLIST  2019    TAVF left arm     Family History   Problem Relation Age of Onset    Heart Disease Mother     No Known Problems Father     Stroke Brother      Social History     Tobacco Use    Smoking status: Former Smoker     Packs/day: 1.50     Years: 40.00     Pack years: 60.00     Last attempt to quit: 10/10/2012     Years since quittin.6    Smokeless tobacco: Never Used   Substance Use Topics    Alcohol use: No        Review of Systems    A comprehensive review of systems was negative except for that written in the HPI. Objective:     Visit Vitals  /67   Pulse 72   Temp 98 °F (36.7 °C) (Oral)   Resp 16   Ht 5' 5\" (1.651 m)   Wt 160 lb (72.6 kg)   BMI 26.63 kg/m²     General:  Alert, cooperative, no distress, appears stated age. Head:  Normocephalic, without obvious abnormality, atraumatic. Eyes:  Conjunctivae/corneas clear.  PERRL, EOMs intact. Fundi benign. Ears:  Normal TMs and external ear canals both ears. Nose: Nares normal. Septum midline. Mucosa normal. No drainage or sinus tenderness. Throat: Lips, mucosa, and tongue normal. Teeth and gums normal.   Neck: Supple, symmetrical, trachea midline, no adenopathy, thyroid: no enlargement/tenderness/nodules, no carotid bruit and no JVD. Back:   Symmetric, no curvature. ROM normal. No CVA tenderness. Lungs:   Clear to auscultation bilaterally. Chest wall:  No tenderness or deformity. Heart:  Regular rate and rhythm, S1, S2 normal, no murmur, click, rub or gallop. Abdomen:   Soft, non-tender. Bowel sounds normal. No masses,  No organomegaly. Extremities: Extremities normal, atraumatic, no cyanosis or edema. Right upper extremity AV fistula, positive thrill, moderate erythema, no warmth, no pus or exudate   Pulses: 2+ and symmetric all extremities. Neurologic: CNII-XII intact. Normal strength, sensation and reflexes throughout. Assessment/Plan:       ICD-10-CM ICD-9-CM    1. [facility]end stage renal disease) [facility] N18.6 585.6 Continue dialysis  Follow up with nephrologist regarding weakness and BP drop after dialysis   2. Essential hypertension I10 401.9 metoprolol succinate (TOPROL XL) 100 mg tablet   3. Hyperlipidemia LDL goal <100 E78.5 272.4    4. AV fistula   Appears stable  Follow up with vascular                        Advised him to call back or return to office if symptoms worsen/change/persist.  Discussed expected course/resolution/complications of diagnosis in detail with patient. Medication risks/benefits/costs/interactions/alternatives discussed with patient. He was given an after visit summary which includes diagnoses, current medications, & vitals. He expressed understanding with the diagnosis and plan.

## 2019-05-17 NOTE — PROGRESS NOTES
Chief Complaint   Patient presents with    Surgical Follow-up     1. Have you been to the ER, urgent care clinic since your last visit? Hospitalized since your last visit? No    2. Have you seen or consulted any other health care providers outside of the [address] [place] [address] since your last visit? Include any pap smears or colon screening.  No

## 2019-05-21 ENCOUNTER — OFFICE VISIT (OUTPATIENT)
Dept: SURGERY | Age: 59
End: 2019-05-21

## 2019-05-21 VITALS
OXYGEN SATURATION: 100 % | HEART RATE: 65 BPM | RESPIRATION RATE: 20 BRPM | SYSTOLIC BLOOD PRESSURE: 105 MMHG | HEIGHT: 65 IN | TEMPERATURE: 97 F | DIASTOLIC BLOOD PRESSURE: 45 MMHG | WEIGHT: 162 LBS | BODY MASS INDEX: 26.99 KG/M2

## 2019-05-21 DIAGNOSIS — Z09 POSTOPERATIVE EXAMINATION: Primary | ICD-10-CM

## 2019-05-21 NOTE — PROGRESS NOTES
Chief Complaint   Patient presents with    End Stage Renal Disease     S/P TAVF LEFT ARM 04/23/2019     1. Have you been to the ER, urgent care clinic since your last visit? Hospitalized since your last visit? No    2. Have you seen or consulted any other health care providers outside of the [address] [place] [address] since your last visit? Include any pap smears or colon screening.  No

## 2019-05-21 NOTE — PROGRESS NOTES
Surgery  Follow up  Procedure: transposed AVF  OR date:    Path:      S I feel fine, no diarrhea    Visit Vitals  /45 (BP 1 Location: Right arm, BP Patient Position: Sitting)   Pulse 65   Temp 97 °F (36.1 °C)   Resp 20   Ht 5' 5\" (1.651 m)   Wt 73.5 kg (162 lb)   SpO2 100%   BMI 26.96 kg/m²       O Incisions healing well without infection   Remaining staples removed    A/P Doing well   RTC with Dr Melissa Zacarias as scheduled    Steven White MD FACS

## 2019-05-29 ENCOUNTER — TELEPHONE (OUTPATIENT)
Dept: SURGERY | Age: 59
End: 2019-05-29

## 2019-05-29 ENCOUNTER — OFFICE VISIT (OUTPATIENT)
Dept: SURGERY | Age: 59
End: 2019-05-29

## 2019-05-29 VITALS
WEIGHT: 167.6 LBS | HEART RATE: 64 BPM | HEIGHT: 65 IN | SYSTOLIC BLOOD PRESSURE: 136 MMHG | OXYGEN SATURATION: 100 % | BODY MASS INDEX: 27.92 KG/M2 | DIASTOLIC BLOOD PRESSURE: 79 MMHG | RESPIRATION RATE: 16 BRPM

## 2019-05-29 DIAGNOSIS — Z09 POSTOPERATIVE EXAMINATION: Primary | ICD-10-CM

## 2019-05-29 NOTE — PROGRESS NOTES
1. Have you been to the ER, urgent care clinic since your last visit? Hospitalized since your last visit? No    2. Have you seen or consulted any other health care providers outside of the [address] [place] [address] since your last visit? Include any pap smears or colon screening.  No

## 2019-05-29 NOTE — PATIENT INSTRUCTIONS
APPOINTMENT WITH [facility]:    Thursday, May 30, 2019 at 9:00 am    [address], [address] [facility], [address] [facility]    [phone]

## 2019-05-29 NOTE — PROGRESS NOTES
The patient is status post creation of transposed AV fistula left arm on 4/23/2019. The patient  has no complaints.     On examination there is considerable swelling of the left arm. A bruit is present in the transposed fistula. There is a 2+ radaal pulse distally. Incisions are healing well. There is transiton to a softer thrill at the 2/3 elvira of the transposed segment.     There is a left sided Permcath present. Duplex US shows a tapered stricture  at the 2/3 elvira of the tranpsosed segment    I will schedule a fistulogram and angioplasty to assess the stricture in the transposed portion of the fistula and to assess for central vein stenosis, which may in part relate to his left sided catheter.     The patient will see me in follow up one week after the fistulogram.    The patient is scheduled soon for cardiac cath as part of his pre-transplant evaluation.     Final Diagnosis: Status post creation transposed AV fistula, post-operative visit.   Arm edema, stricture in AV fistula     CC:  Dr Roxie Holley

## 2019-05-30 NOTE — TELEPHONE ENCOUNTER
Spoke with Ms Jonespravni. Correction to appt at [facility]. Pt has appt on Friday, 5/31/19 at 7:30 am for fistulogram & angioplasty. Follow up appt with Dr William Wilson on 6/12/19.

## 2019-06-06 ENCOUNTER — TELEPHONE (OUTPATIENT)
Dept: SURGERY | Age: 59
End: 2019-06-06

## 2019-06-06 ENCOUNTER — OFFICE VISIT (OUTPATIENT)
Dept: SURGERY | Age: 59
End: 2019-06-06

## 2019-06-06 VITALS
SYSTOLIC BLOOD PRESSURE: 116 MMHG | TEMPERATURE: 97.5 F | OXYGEN SATURATION: 98 % | BODY MASS INDEX: 27.63 KG/M2 | HEART RATE: 64 BPM | DIASTOLIC BLOOD PRESSURE: 66 MMHG | WEIGHT: 165.8 LBS | RESPIRATION RATE: 16 BRPM | HEIGHT: 65 IN

## 2019-06-06 DIAGNOSIS — Z09 POSTOPERATIVE EXAMINATION: Primary | ICD-10-CM

## 2019-06-06 RX ORDER — LORATADINE 10 MG/1
TABLET ORAL
COMMUNITY
End: 2019-12-27

## 2019-06-06 RX ORDER — ATORVASTATIN CALCIUM 80 MG/1
1 TABLET, FILM COATED ORAL
COMMUNITY
End: 2019-12-27

## 2019-06-06 NOTE — TELEPHONE ENCOUNTER
Spoke with dialysis nurse. Per Dr Yamilet Carrion, ok to use AV fistula left arm starting Monday, 6/10/19. Will fax today's office note when ready.

## 2019-06-06 NOTE — PROGRESS NOTES
Chief Complaint   Patient presents with    Surgical Follow-up     TAVF LF ARM 4/23/19     1. Have you been to the ER, urgent care clinic since your last visit? Hospitalized since your last visit? No    2. Have you seen or consulted any other health care providers outside of the [address] [place] [address] since your last visit? Include any pap smears or colon screening. Left side cardiac cath, 6/4/19 [facility] , Dr. Yair Suarez.

## 2019-06-06 NOTE — PROGRESS NOTES
The patient is status post creation of transposed left brachiobasilic AV fistula on 7/23/16. He had a good deal of swelling after surgery and was also noted at last visit to have evidence of a stricture in the fistula. He went to [place] and had fistulogram and angioplasty of a stricture within the AV fistula on 5/31/19. Interestingly, no evidence of stenosis in the central veins was found. The patient reports that his arm swelling has been gradually decreasing. He has no complaints. On examination there is a good thrill at the AV anastomosis. The outflow vein is palpable. Edema in the arm has decreased. There is a palpable radial pulse distally. Hand edema is essentially resolved. The transposed AV fistula can be used for hemodialysis starting next week on 6/10/19. The patient will see me in follow up in four weeks. Final Diagnosis: Status post creation transposed AV fistula, post-operative visit. c: Romayne Gables, MD    OK to use AV fistula for dialysis on 6/10/2019.     Aliyah Olvera MD

## 2019-06-11 NOTE — TELEPHONE ENCOUNTER
Faxed 6/6/19 office note to dialysis at [phone], confirmation received. Next appt with Dr Millie Peralta on 7/3/19.

## 2019-07-03 ENCOUNTER — OFFICE VISIT (OUTPATIENT)
Dept: SURGERY | Age: 59
End: 2019-07-03

## 2019-07-03 VITALS
TEMPERATURE: 98.3 F | HEIGHT: 65 IN | SYSTOLIC BLOOD PRESSURE: 114 MMHG | RESPIRATION RATE: 16 BRPM | BODY MASS INDEX: 27.82 KG/M2 | WEIGHT: 167 LBS | DIASTOLIC BLOOD PRESSURE: 66 MMHG | OXYGEN SATURATION: 100 % | HEART RATE: 61 BPM

## 2019-07-03 DIAGNOSIS — Z09 POSTOPERATIVE EXAMINATION: Primary | ICD-10-CM

## 2019-07-03 NOTE — PROGRESS NOTES
Chief Complaint   Patient presents with    Surgical Follow-up     TAVF left ARM 4/23/19. 1. Have you been to the ER, urgent care clinic since your last visit? Hospitalized since your last visit? [facility], infection in port 7/2019.    2. Have you seen or consulted any other health care providers outside of the [address] [place] [address] since your last visit? Include any pap smears or colon screening.  No

## 2019-07-03 NOTE — PROGRESS NOTES
Surgery    The patient is status post creation of transposed AV fistula on 4/23/2019. The patient  has no complaints. The fistula is now being used for dialysis.       On examination there is a good thrill present. The outflow vein is readily palpable. The hand is warm. There is moderate pulsatility of the fistula.     The patient had prior angioplasty at [facility]. He will follow up there as scheduled.     The patient can follow up PRN.     Final Diagnosis: Status post creation transposed AV fistula, post-operative visit.     CC: Dr Liang Brenner

## 2019-07-15 ENCOUNTER — OFFICE VISIT (OUTPATIENT)
Dept: INTERNAL MEDICINE CLINIC | Facility: CLINIC | Age: 59
End: 2019-07-15

## 2019-07-15 VITALS
BODY MASS INDEX: 27.99 KG/M2 | HEART RATE: 60 BPM | DIASTOLIC BLOOD PRESSURE: 77 MMHG | WEIGHT: 168 LBS | HEIGHT: 65 IN | TEMPERATURE: 97.9 F | RESPIRATION RATE: 16 BRPM | SYSTOLIC BLOOD PRESSURE: 119 MMHG

## 2019-07-15 DIAGNOSIS — E78.5 HYPERLIPIDEMIA LDL GOAL <100: ICD-10-CM

## 2019-07-15 DIAGNOSIS — I10 ESSENTIAL HYPERTENSION: Primary | ICD-10-CM

## 2019-07-15 DIAGNOSIS — N18.6 ESRD (END STAGE RENAL DISEASE) (HCC): ICD-10-CM

## 2019-07-15 NOTE — PROGRESS NOTES
Chief Complaint   Patient presents with   [facility] Follow Up   1. Have you been to the ER, urgent care clinic since your last visit? Hospitalized since your last visit? Yes When: 6/26/19 Where: [address] [place] [address] Reason for visit: Catheter infection. 2. Have you seen or consulted any other health care providers outside of the [address] [place] [address] since your last visit? Include any pap smears or colon screening.  No

## 2019-07-15 NOTE — PROGRESS NOTES
Subjective:      Blair Chakrabotry is a 60 y.o. male who presents today for   Chief Complaint   Patient presents with   [facility] Follow Up   patient at [address] from June 26 to July 1  Had catheter removed after infection  Patient says he feels better. He is now using left  AV brachiobasilic fistula for dialysis  He goes to dialysis m,w,f    Hypertension- BP is well controlled    Hyperlipidemia- takes statin daily      Patient Active Problem List    Diagnosis Date Noted    ESRD (end stage renal disease) ([facility] [address] 04/23/2019    Chest pain 10/10/2018    Preoperative clearance 10/10/2018    CKD (chronic kidney disease) stage 5, GFR less than 15 ml/min ([facility] [address] 10/02/2018    ESRF (end stage renal failure) ([facility])--for creation of AV fistula 09/26/2018    Iron deficiency anemia 04/27/2018    Acute gout 04/27/2018    Overweight (BMI 25.0-29.9) 03/21/2018    Essential hypertension 11/17/2017    History of nephrectomy 11/17/2017    Hyperlipidemia LDL goal <100 11/17/2017    History of stroke 11/17/2017    Sleep apnea 11/17/2017     Current Outpatient Medications   Medication Sig Dispense Refill    loratadine (CLARITIN) 10 mg tablet loratadine 10 mg tablet   Take 1 tablet(s) every day by oral route.  atorvastatin (LIPITOR) 80 mg tablet Take 1 Tab by mouth.  metoprolol succinate (TOPROL XL) 100 mg tablet Take 1 Tab by mouth daily. 90 Tab 1    ferrous sulfate 325 mg (65 mg iron) tablet Take 1 Tab by mouth Daily (before breakfast). 90 Tab 0    OTHER AFO  Right foot drop 1 Units 0    folic acid (FOLVITE) 1 mg tablet Take 1 Tab by mouth daily. 30 Tab 1    rosuvastatin (CRESTOR) 10 mg tablet Take 1 Tab by mouth nightly. 90 Tab 1    clopidogrel (PLAVIX) 75 mg tab TAKE 1 TABLET BY MOUTH EVERY DAY 90 Tab 1    amLODIPine (NORVASC) 10 mg tablet Take 10 mg by mouth daily.  calcium acetate (PHOSLO) 667 mg cap Take  by mouth three (3) times daily (with meals).       cloNIDine HCl (CATAPRES) 0.3 mg tablet Take 1 Tab by mouth two (2) times a day. 60 Tab 2    ubidecarenone (CO Q-10 PO) Take  by mouth daily.  vitamin e (AQUA GEMS) 200 unit capsule Take  by mouth daily.  cholecalciferol, VITAMIN D3, (VITAMIN D3) 5,000 unit tab tablet Take  by mouth daily.  aspirin 81 mg chewable tablet Take 81 mg by mouth daily. Allergies   Allergen Reactions    Chlorhexidine Itching     Past Medical History:   Diagnosis Date    Chronic kidney disease     [facility]    Fatigue     Gout     Hypercholesterolemia     Hypertension     Sleep apnea     cpap    Snoring     Stroke ([facility] [address]     TIA, stroke ,      Past Surgical History:   Procedure Laterality Date    HX HEENT      tonsillectomy in     HX HEENT      nasal polypectomy    HX NEPHRECTOMY      kidney removed    HX VASCULAR ACCESS Left     neck for dialysis    VASCULAR SURGERY PROCEDURE UNLIST  01/15/2019    Creation of AV Fistula Left Arm    VASCULAR SURGERY PROCEDURE UNLIST  2019    TAVF left arm     Family History   Problem Relation Age of Onset    Heart Disease Mother     No Known Problems Father     Stroke Brother      Social History     Tobacco Use    Smoking status: Former Smoker     Packs/day: 1.50     Years: 40.00     Pack years: 60.00     Last attempt to quit: 10/10/2012     Years since quittin.7    Smokeless tobacco: Never Used   Substance Use Topics    Alcohol use: No        Review of Systems    A comprehensive review of systems was negative except for that written in the HPI. Objective:     Visit Vitals  /77   Pulse 60   Temp 97.9 °F (36.6 °C) (Oral)   Resp 16   Ht 5' 5\" (1.651 m)   Wt 168 lb (76.2 kg)   BMI 27.96 kg/m²     General:  Alert, cooperative, no distress, appears stated age. Head:  Normocephalic, without obvious abnormality, atraumatic. Eyes:  Conjunctivae/corneas clear. PERRL, EOMs intact. Fundi benign. Ears:  Normal TMs and external ear canals both ears.    Nose: Nares normal. Septum midline. Mucosa normal. No drainage or sinus tenderness. Throat: Lips, mucosa, and tongue normal. Teeth and gums normal.   Neck: Supple, symmetrical, trachea midline, no adenopathy, thyroid: no enlargement/tenderness/nodules, no carotid bruit and no JVD. Back:   Symmetric, no curvature. ROM normal. No CVA tenderness. Lungs:   Clear to auscultation bilaterally. Chest wall:  No tenderness or deformity. Heart:  Regular rate and rhythm, S1, S2 normal, no murmur, click, rub or gallop. Abdomen:   Soft, non-tender. Bowel sounds normal. No masses,  No organomegaly. Extremities: Left AV fistula, positive thrill   Pulses: 2+ and symmetric all extremities. Neurologic: CNII-XII intact. Normal strength, sensation and reflexes throughout. Assessment/Plan:       ICD-10-CM ICD-9-CM    1. Essential hypertension I10 401.9 Stable, continue current management   2. ESRD (end stage renal disease) ([facility] [address] N18.6 585.6 Continue dialysis M,W,F   3. Hyperlipidemia LDL goal <100 E78.5 272.4 Continue statin          Advised him to call back or return to office if symptoms worsen/change/persist.  Discussed expected course/resolution/complications of diagnosis in detail with patient. Medication risks/benefits/costs/interactions/alternatives discussed with patient. He was given an after visit summary which includes diagnoses, current medications, & vitals. He expressed understanding with the diagnosis and plan.

## 2019-09-23 PROBLEM — Z01.818 PREOPERATIVE CLEARANCE: Status: RESOLVED | Noted: 2018-10-10 | Resolved: 2019-09-23

## 2019-11-13 RX ORDER — AMLODIPINE BESYLATE 10 MG/1
10 TABLET ORAL DAILY
Qty: 90 TAB | Refills: 1 | Status: SHIPPED | OUTPATIENT
Start: 2019-11-13 | End: 2022-10-04

## 2019-12-11 ENCOUNTER — OFFICE VISIT (OUTPATIENT)
Dept: INTERNAL MEDICINE CLINIC | Age: 59
End: 2019-12-11

## 2019-12-11 VITALS
RESPIRATION RATE: 18 BRPM | DIASTOLIC BLOOD PRESSURE: 78 MMHG | BODY MASS INDEX: 28.49 KG/M2 | TEMPERATURE: 98.2 F | HEART RATE: 70 BPM | HEIGHT: 65 IN | SYSTOLIC BLOOD PRESSURE: 124 MMHG | WEIGHT: 171 LBS

## 2019-12-11 DIAGNOSIS — I10 ESSENTIAL HYPERTENSION: Primary | ICD-10-CM

## 2019-12-11 DIAGNOSIS — D50.9 IRON DEFICIENCY ANEMIA, UNSPECIFIED IRON DEFICIENCY ANEMIA TYPE: ICD-10-CM

## 2019-12-11 DIAGNOSIS — E78.5 HYPERLIPIDEMIA LDL GOAL <100: ICD-10-CM

## 2019-12-11 DIAGNOSIS — N18.6 ESRF (END STAGE RENAL FAILURE) (HCC): ICD-10-CM

## 2019-12-11 DIAGNOSIS — G47.30 SLEEP APNEA, UNSPECIFIED TYPE: ICD-10-CM

## 2019-12-11 DIAGNOSIS — Z86.73 HISTORY OF STROKE: ICD-10-CM

## 2019-12-11 NOTE — PROGRESS NOTES
Chief Complaint   Patient presents with    Hypertension    Cholesterol Problem       1. Have you been to the ER, urgent care clinic since your last visit? Hospitalized since your last visit? No    2. Have you seen or consulted any other health care providers outside of the [address] [place] [address] since your last visit? Include any pap smears or colon screening.  No

## 2019-12-11 NOTE — PROGRESS NOTES
Subjective:      Dilia Quiroz is a 60 y.o. male who presents today for   Chief Complaint   Patient presents with    Hypertension    Cholesterol Problem     Patient in today for evaluation    Flu shot UTD    Left AV shunt fistulogram last week  Dialysis M,W,F  Vascular is Dr. Sourav Patricia  Having some bleeding from site after dialysis however not very frequent    HTN- compliant with antihypertensives    Hyperlipidemia-  Continues on crestor    CVA- currently on Plavix  Has seen Dr. Ada Oliver (neurologist)  Will reorder CT of Head since patient did not have it done after order by neurology    BRISSA    Patient Active Problem List    Diagnosis Date Noted    ESRD (end stage renal disease) ([facility] [address] 04/23/2019    Chest pain 10/10/2018    CKD (chronic kidney disease) stage 5, GFR less than 15 ml/min ([facility] [address] 10/02/2018    ESRF (end stage renal failure) ([facility])--for creation of AV fistula 09/26/2018    Iron deficiency anemia 04/27/2018    Acute gout 04/27/2018    Overweight (BMI 25.0-29.9) 03/21/2018    Essential hypertension 11/17/2017    History of nephrectomy 11/17/2017    Hyperlipidemia LDL goal <100 11/17/2017    History of stroke 11/17/2017    Sleep apnea 11/17/2017     Current Outpatient Medications   Medication Sig Dispense Refill    metoprolol succinate (TOPROL XL) 100 mg tablet Take 1 Tab by mouth daily. 90 Tab 1    amLODIPine (NORVASC) 10 mg tablet Take 1 Tab by mouth daily. 90 Tab 1    ferrous sulfate 325 mg (65 mg iron) tablet Take 1 Tab by mouth Daily (before breakfast). 90 Tab 0    OTHER AFO  Right foot drop 1 Units 0    folic acid (FOLVITE) 1 mg tablet Take 1 Tab by mouth daily. 30 Tab 1    rosuvastatin (CRESTOR) 10 mg tablet Take 1 Tab by mouth nightly. 90 Tab 1    clopidogrel (PLAVIX) 75 mg tab TAKE 1 TABLET BY MOUTH EVERY DAY 90 Tab 1    calcium acetate (PHOSLO) 667 mg cap Take  by mouth three (3) times daily (with meals).       cloNIDine HCl (CATAPRES) 0.3 mg tablet Take 1 Tab by mouth two (2) times a day. 60 Tab 2    ubidecarenone (CO Q-10 PO) Take  by mouth daily.  vitamin e (AQUA GEMS) 200 unit capsule Take  by mouth daily.  cholecalciferol, VITAMIN D3, (VITAMIN D3) 5,000 unit tab tablet Take  by mouth daily.  aspirin 81 mg chewable tablet Take 81 mg by mouth daily.  loratadine (CLARITIN) 10 mg tablet loratadine 10 mg tablet   Take 1 tablet(s) every day by oral route.  atorvastatin (LIPITOR) 80 mg tablet Take 1 Tab by mouth. Allergies   Allergen Reactions    Chlorhexidine Itching     Past Medical History:   Diagnosis Date    Chronic kidney disease     dialysis/  [facility] m-w-f    Fatigue     Gout     Hypercholesterolemia     Hypertension     Sleep apnea     cpap    Snoring     Stroke ([facility] [address]     TIA, stroke ,      Past Surgical History:   Procedure Laterality Date    HX HEENT      tonsillectomy in     HX HEENT      nasal polypectomy    HX NEPHRECTOMY  1980    kidney removed    HX VASCULAR ACCESS Left     neck for dialysis    VASCULAR SURGERY PROCEDURE UNLIST  01/15/2019    Creation of AV Fistula Left Arm    VASCULAR SURGERY PROCEDURE UNLIST  2019    TAVF left arm     Family History   Problem Relation Age of Onset    Heart Disease Mother     No Known Problems Father     Stroke Brother      Social History     Tobacco Use    Smoking status: Former Smoker     Packs/day: 1.50     Years: 40.00     Pack years: 60.00     Last attempt to quit: 10/10/2012     Years since quittin.1    Smokeless tobacco: Never Used   Substance Use Topics    Alcohol use: No        Review of Systems    A comprehensive review of systems was negative except for that written in the HPI. Objective:     Visit Vitals  /78   Pulse 70   Temp 98.2 °F (36.8 °C) (Oral)   Resp 18   Ht 5' 5\" (1.651 m)   Wt 171 lb (77.6 kg)   BMI 28.46 kg/m²     General:  Alert, cooperative, no distress, appears stated age.    Head:  Normocephalic, without obvious abnormality, atraumatic. Eyes:  Conjunctivae/corneas clear. PERRL, EOMs intact. Fundi benign. Ears:  Normal TMs and external ear canals both ears. Nose: Nares normal. Septum midline. Mucosa normal. No drainage or sinus tenderness. Throat: Lips, mucosa, and tongue normal. Teeth and gums normal.   Neck: Supple, symmetrical, trachea midline, no adenopathy, thyroid: no enlargement/tenderness/nodules, no carotid bruit and no JVD. Back:   Symmetric, no curvature. ROM normal. No CVA tenderness. Lungs:   Clear to auscultation bilaterally. Chest wall:  No tenderness or deformity. Heart:  Regular rate and rhythm, S1, S2 normal, no murmur, click, rub or gallop. Abdomen:   Soft, non-tender. Bowel sounds normal. No masses,  No organomegaly. Extremities: Left AV fistula   Pulses: 2+ and symmetric all extremities. Neurologic: CNII-XII intact. Normal strength, sensation and reflexes throughout. Assessment/Plan:       ICD-10-CM ICD-9-CM    1. Essential hypertension I10 401.9 Continue current medications   2. Hyperlipidemia LDL goal <100 E78.5 272.4 LIPID PANEL   3. History of stroke Z86.73 V12.54 CT HEAD WO CONT  Currently being followed by neurology   4. Iron deficiency anemia, unspecified iron deficiency anemia type D50.9 280.9 CBC WITH AUTOMATED DIFF   5. Sleep apnea, unspecified type G47.30 780.57 Use cpap   6. ESRF (end stage renal failure) ([facility])--for creation of AV fistula P70.1 523.7 METABOLIC PANEL, COMPREHENSIVE  Continue scheduled dialysis          Advised him to call back or return to office if symptoms worsen/change/persist.  Discussed expected course/resolution/complications of diagnosis in detail with patient. Medication risks/benefits/costs/interactions/alternatives discussed with patient. He was given an after visit summary which includes diagnoses, current medications, & vitals. He expressed understanding with the diagnosis and plan.

## 2019-12-12 LAB
ALBUMIN SERPL-MCNC: 4.5 G/DL (ref 3.6–4.8)
ALBUMIN/GLOB SERPL: 1.9 {RATIO} (ref 1.2–2.2)
ALP SERPL-CCNC: 45 IU/L (ref 39–117)
ALT SERPL-CCNC: 8 IU/L (ref 0–44)
AST SERPL-CCNC: 15 IU/L (ref 0–40)
BASOPHILS # BLD AUTO: 0.1 X10E3/UL (ref 0–0.2)
BASOPHILS NFR BLD AUTO: 2 %
BILIRUB SERPL-MCNC: 0.6 MG/DL (ref 0–1.2)
BUN SERPL-MCNC: 58 MG/DL (ref 8–27)
BUN/CREAT SERPL: 8 (ref 10–24)
CALCIUM SERPL-MCNC: 9.3 MG/DL (ref 8.6–10.2)
CHLORIDE SERPL-SCNC: 99 MMOL/L (ref 96–106)
CHOLEST SERPL-MCNC: 217 MG/DL (ref 100–199)
CO2 SERPL-SCNC: 19 MMOL/L (ref 20–29)
CREAT SERPL-MCNC: 7.17 MG/DL (ref 0.76–1.27)
EOSINOPHIL # BLD AUTO: 0.5 X10E3/UL (ref 0–0.4)
EOSINOPHIL NFR BLD AUTO: 10 %
ERYTHROCYTE [DISTWIDTH] IN BLOOD BY AUTOMATED COUNT: 13.2 % (ref 12.3–15.4)
GLOBULIN SER CALC-MCNC: 2.4 G/DL (ref 1.5–4.5)
GLUCOSE SERPL-MCNC: 83 MG/DL (ref 65–99)
HCT VFR BLD AUTO: 33.9 % (ref 37.5–51)
HDLC SERPL-MCNC: 41 MG/DL
HGB BLD-MCNC: 11.5 G/DL (ref 13–17.7)
IMM GRANULOCYTES # BLD AUTO: 0 X10E3/UL (ref 0–0.1)
IMM GRANULOCYTES NFR BLD AUTO: 0 %
LDLC SERPL CALC-MCNC: 125 MG/DL (ref 0–99)
LYMPHOCYTES # BLD AUTO: 1.1 X10E3/UL (ref 0.7–3.1)
LYMPHOCYTES NFR BLD AUTO: 21 %
MCH RBC QN AUTO: 27.6 PG (ref 26.6–33)
MCHC RBC AUTO-ENTMCNC: 33.9 G/DL (ref 31.5–35.7)
MCV RBC AUTO: 82 FL (ref 79–97)
MONOCYTES # BLD AUTO: 0.5 X10E3/UL (ref 0.1–0.9)
MONOCYTES NFR BLD AUTO: 9 %
NEUTROPHILS # BLD AUTO: 3 X10E3/UL (ref 1.4–7)
NEUTROPHILS NFR BLD AUTO: 58 %
PLATELET # BLD AUTO: 320 X10E3/UL (ref 150–450)
POTASSIUM SERPL-SCNC: 4.3 MMOL/L (ref 3.5–5.2)
PROT SERPL-MCNC: 6.9 G/DL (ref 6–8.5)
RBC # BLD AUTO: 4.16 X10E6/UL (ref 4.14–5.8)
SODIUM SERPL-SCNC: 142 MMOL/L (ref 134–144)
TRIGL SERPL-MCNC: 253 MG/DL (ref 0–149)
VLDLC SERPL CALC-MCNC: 51 MG/DL (ref 5–40)
WBC # BLD AUTO: 5.2 X10E3/UL (ref 3.4–10.8)

## 2019-12-31 ENCOUNTER — HOSPITAL ENCOUNTER (OUTPATIENT)
Dept: CT IMAGING | Age: 59
Discharge: HOME | End: 2019-12-31
Attending: INTERNAL MEDICINE
Payer: MEDICARE

## 2019-12-31 DIAGNOSIS — Z86.73 HISTORY OF STROKE: ICD-10-CM

## 2019-12-31 PROCEDURE — 70450 CT HEAD/BRAIN W/O DYE: CPT

## 2020-01-07 NOTE — PROGRESS NOTES
Phone call to Dr. Cantrell's office.  Spoke with nurse Val.  She states that Ms. Rivero has not received immunizations.  I scheduled appts for the patient to start her immunizations on Jan. 20th.  Appointments mailed to patient along with instructions.  MANUELA Beckham RN    WBC elevation noted with thrombocytosis. Will investigate possible sources of infection. Urinalysis ordered, repeat CBC needed next week to monitor trends. Will ask nurse to call today to assess for infectious etiology.  Elevations noted in cholesterol, no previous study to compare it to

## 2020-04-10 ENCOUNTER — VIRTUAL VISIT (OUTPATIENT)
Dept: INTERNAL MEDICINE CLINIC | Age: 60
End: 2020-04-10

## 2020-04-10 DIAGNOSIS — R31.9 HEMATURIA, UNSPECIFIED TYPE: Primary | ICD-10-CM

## 2020-04-10 DIAGNOSIS — R30.0 DYSURIA: ICD-10-CM

## 2020-04-10 RX ORDER — CIPROFLOXACIN 500 MG/1
500 TABLET ORAL DAILY
Qty: 14 TAB | Refills: 0 | Status: SHIPPED | OUTPATIENT
Start: 2020-04-10 | End: 2020-04-24

## 2020-04-10 NOTE — PROGRESS NOTES
Zuly Masterson is a 60 y.o. male evaluated via telephone on 4/10/2020. Consent:  He and/or health care decision maker is aware that that he may receive a bill for this telephone service, depending on his insurance coverage, and has provided verbal consent to proceed: Yes      Documentation:  I communicated with the patient and/or health care decision maker about blood in urine and symptoms of UTI  Details of this discussion including any medical advice provided:  Patient is concerned he has a UTI. Has blood in urine. Noted 2 days ago. Denies back pain, pain in lower abdomen, reports subjective fever, chills. He is currently at dialysis during telephone visit. He noticed soreness when he urinates. Drinking water and cranberry juice. Took AZO. Temp checked today at dialysis afebrile    I also called and discussed patient's case with his wife. Will start him empirically on ciprofloxacin 500 mg daily. If patient worsens ie increased pain, or fever she is to take him to the ER asap    Differential UTI vs nephrolithiasis vs prostatitis    Will order UA and culture as well            I affirm this is a Patient Initiated Episode with an Established Patient who has not had a related appointment within my department in the past 7 days or scheduled within the next 24 hours.     Total Time: minutes: 11-20 minutes    Note: not billable if this call serves to triage the patient into an appointment for the relevant concern      Fara Johansen MD

## 2020-04-13 LAB
APPEARANCE UR: ABNORMAL
BACTERIA #/AREA URNS HPF: ABNORMAL /[HPF]
BACTERIA UR CULT: NO GROWTH
BILIRUB UR QL STRIP: NEGATIVE
CASTS URNS QL MICRO: ABNORMAL /LPF
COLOR UR: ABNORMAL
CRYSTALS URNS MICRO: ABNORMAL
EPI CELLS #/AREA URNS HPF: ABNORMAL /HPF (ref 0–10)
GLUCOSE UR QL: ABNORMAL
HGB UR QL STRIP: ABNORMAL
KETONES UR QL STRIP: NEGATIVE
LEUKOCYTE ESTERASE UR QL STRIP: ABNORMAL
MICRO URNS: ABNORMAL
MUCOUS THREADS URNS QL MICRO: PRESENT
NITRITE UR QL STRIP: POSITIVE
PH UR STRIP: 5.5 [PH] (ref 5–7.5)
PROT UR QL STRIP: ABNORMAL
RBC #/AREA URNS HPF: >30 /HPF (ref 0–2)
SP GR UR: 1.02 (ref 1–1.03)
UNIDENT CRYS URNS QL MICRO: PRESENT
UROBILINOGEN UR STRIP-MCNC: 2 MG/DL (ref 0.2–1)
WBC #/AREA URNS HPF: >30 /HPF (ref 0–5)

## 2020-04-14 NOTE — PROGRESS NOTES
Called and spoke with pt who reports he is feeling better after taking abt. I explained that his culture was negative but she wanted him to complete all of the cipro.      Ebenezer Ambrocio LPN

## 2020-07-02 ENCOUNTER — TELEPHONE (OUTPATIENT)
Dept: CARDIOLOGY CLINIC | Age: 60
End: 2020-07-02

## 2020-07-02 NOTE — TELEPHONE ENCOUNTER
Received STAT request for records from [facility]. Faxed Dr. Jimmie Phillip last OV, EKG echo and EST to them at fax $ [phone].

## 2020-09-08 ENCOUNTER — OFFICE VISIT (OUTPATIENT)
Dept: URGENT CARE | Age: 60
End: 2020-09-08
Payer: MEDICARE

## 2020-09-08 VITALS — RESPIRATION RATE: 17 BRPM | HEART RATE: 96 BPM | OXYGEN SATURATION: 97 % | TEMPERATURE: 98.8 F

## 2020-09-08 DIAGNOSIS — R09.89 RUNNY NOSE: ICD-10-CM

## 2020-09-08 DIAGNOSIS — R05.9 COUGH: Primary | ICD-10-CM

## 2020-09-08 DIAGNOSIS — Z11.59 SCREENING FOR VIRAL DISEASE: ICD-10-CM

## 2020-09-08 PROCEDURE — 3017F COLORECTAL CA SCREEN DOC REV: CPT | Performed by: FAMILY MEDICINE

## 2020-09-08 PROCEDURE — G8419 CALC BMI OUT NRM PARAM NOF/U: HCPCS | Performed by: FAMILY MEDICINE

## 2020-09-08 PROCEDURE — G8432 DEP SCR NOT DOC, RNG: HCPCS | Performed by: FAMILY MEDICINE

## 2020-09-08 PROCEDURE — 99203 OFFICE O/P NEW LOW 30 MIN: CPT | Performed by: FAMILY MEDICINE

## 2020-09-08 PROCEDURE — G8427 DOCREV CUR MEDS BY ELIG CLIN: HCPCS | Performed by: FAMILY MEDICINE

## 2020-09-08 PROCEDURE — G9231 DOC ESRD DIA TRANS PREG: HCPCS | Performed by: FAMILY MEDICINE

## 2020-09-08 NOTE — PROGRESS NOTES
This patient was seen in [facility] at [address] [place] [address] Urgent Care while in their vehicle due to COVID-19 pandemic with PPE and focused examination in order to decrease community viral transmission. The patient/guardian gave verbal consent to treat. Moises Jack is a 63 y.o. male who presents with temp of 100.8, cough, runny nose and slight ST x 2 days. No known COVID-19 exposure. Denies SOB. PMH: CKD on dialysis, HTN, HLD, BRISSA. Former smoker. The history is provided by the patient.         Past Medical History:   Diagnosis Date    Chronic kidney disease     dialysis/  [facility] -w-    Fatigue     Gout     Hypercholesterolemia     Hypertension     Sleep apnea     cpap    Snoring     Stroke [facility])     TIA, stroke ,         Past Surgical History:   Procedure Laterality Date    HX HEENT      tonsillectomy in     HX HEENT      nasal polypectomy    HX NEPHRECTOMY      kidney removed    HX VASCULAR ACCESS Left     neck for dialysis    VASCULAR SURGERY PROCEDURE UNLIST  01/15/2019    Creation of AV Fistula Left Arm    VASCULAR SURGERY PROCEDURE UNLIST  2019    TAVF left arm         Family History   Problem Relation Age of Onset    Heart Disease Mother     No Known Problems Father     Stroke Brother         Social History     Socioeconomic History    Marital status:      Spouse name: Not on file    Number of children: Not on file    Years of education: Not on file    Highest education level: Not on file   Occupational History    Not on file   Social Needs    Financial resource strain: Not on file    Food insecurity     Worry: Not on file     Inability: Not on file    Transportation needs     Medical: Not on file     Non-medical: Not on file   Tobacco Use    Smoking status: Former Smoker     Packs/day: 1.50     Years: 40.00     Pack years: 60.00     Last attempt to quit: 10/10/2012     Years since quittin.9    Smokeless tobacco: Never Used   Substance and Sexual Activity    Alcohol use: No    Drug use: No    Sexual activity: Yes     Partners: Female   Lifestyle    Physical activity     Days per week: Not on file     Minutes per session: Not on file    Stress: Not on file   Relationships    Social connections     Talks on phone: Not on file     Gets together: Not on file     Attends Synagogue service: Not on file     Active member of club or organization: Not on file     Attends meetings of clubs or organizations: Not on file     Relationship status: Not on file    Intimate partner violence     Fear of current or ex partner: Not on file     Emotionally abused: Not on file     Physically abused: Not on file     Forced sexual activity: Not on file   Other Topics Concern    Not on file   Social History Narrative    Not on file                ALLERGIES: Chlorhexidine    Review of Systems   Constitutional: Negative for activity change, appetite change, chills and fever. HENT: Positive for rhinorrhea and sore throat. Negative for congestion. Respiratory: Positive for cough. Negative for shortness of breath and wheezing. Cardiovascular: Negative for chest pain. Gastrointestinal: Negative for abdominal pain, diarrhea, nausea and vomiting. Musculoskeletal: Negative for myalgias. Neurological: Negative for headaches. Vitals:    09/08/20 1421   Pulse: 96   Resp: 17   Temp: 98.8 °F (37.1 °C)   SpO2: 97%       Physical Exam  Vitals signs and nursing note reviewed. Constitutional:       General: He is not in acute distress. Appearance: He is well-developed. He is not diaphoretic. Pulmonary:      Effort: Pulmonary effort is normal. No respiratory distress. Breath sounds: Normal breath sounds. No stridor. No wheezing, rhonchi or rales. Neurological:      Mental Status: He is alert. Psychiatric:         Behavior: Behavior normal.         Thought Content:  Thought content normal.         Judgment: Judgment normal. MDM    ICD-10-CM ICD-9-CM   1. Cough  R05 786.2   2. Runny nose  R09.89 784.99   3. Screening for viral disease  Z11.59 V73.99       Orders Placed This Encounter    NOVEL CORONAVIRUS (COVID-19)     Scheduling Instructions:      1) Due to current limited availability of the COVID-19 PCR test, tests will be prioritized and may not be completed.              2) Order only if the test result will change clinical management or necessary for a return to mission-critical employment decision.              3) Print and instruct patient to adhere to CDC home isolation program. (Link Above)              4) Set up or refer patient for a monitoring program.              5) Have patient sign up for and leverage [employer] (if not previously done). Order Specific Question:   Is this test for diagnosis or screening? Answer:   Diagnosis of ill patient     Order Specific Question:   Symptomatic for COVID-19 as defined by CDC? Answer:   Yes     Order Specific Question:   Date of Symptom Onset     Answer:   9/6/2020     Order Specific Question:   Hospitalized for COVID-19? Answer:   No     Order Specific Question:   Admitted to ICU for COVID-19? Answer:   No     Order Specific Question:   Employed in healthcare setting? Answer:   No     Order Specific Question:   Resident in a congregate (group) care setting? Answer:   No     Order Specific Question:   Previously tested for COVID-19? Answer:   No        Self Quarantine  Deep breathing exercises  Tylenol prn  Increase fluids    If signs and symptoms become worse the pt is to go to the ER.          Procedures

## 2020-09-10 LAB — SARS-COV-2, NAA: NOT DETECTED

## 2021-05-07 ENCOUNTER — VIRTUAL VISIT (OUTPATIENT)
Dept: INTERNAL MEDICINE CLINIC | Age: 61
End: 2021-05-07
Payer: MEDICARE

## 2021-05-07 DIAGNOSIS — N18.6 ESRD (END STAGE RENAL DISEASE) (HCC): ICD-10-CM

## 2021-05-07 DIAGNOSIS — R31.9 HEMATURIA, UNSPECIFIED TYPE: Primary | ICD-10-CM

## 2021-05-07 PROCEDURE — 99443 PR PHYS/QHP TELEPHONE EVALUATION 21-30 MIN: CPT | Performed by: INTERNAL MEDICINE

## 2021-05-07 RX ORDER — CIPROFLOXACIN 500 MG/1
500 TABLET ORAL DAILY
Qty: 14 TAB | Refills: 0 | Status: SHIPPED | OUTPATIENT
Start: 2021-05-07 | End: 2021-05-17

## 2021-05-07 NOTE — PROGRESS NOTES
Andreina Lee is a 63 y.o. male, evaluated via audio-only technology on 5/7/2021 for No chief complaint on file. .    Assessment & Plan:   Diagnoses and all orders for this visit:    1. Hematuria, unspecified type    2. ESRD (end stage renal disease) ([facility] [address]    Other orders  -     ciprofloxacin HCl (CIPRO) 500 mg tablet; Take 1 Tab by mouth daily for 14 days. 12  Subjective:   Patient in today for evaluation. Complained of pain last night with urination. Majority of hx given today per wife hx. Temp 99.1 last night today noticed a little blood  Had chills in dialysis today. He was given cefazolin IV at dialysis. She says blood cultures were done? His current temp is 101.3 during virtual visit today at 5:25 pm    Low threshold for going to hospital - advised wife to take him however she is hesitant  Also advised her to call on call renal consultant and let them know about fever  Will send 14 day course of cipro  Patient has been fully vaccinated moderna x 2      Prior to Admission medications    Medication Sig Start Date End Date Taking? Authorizing Provider   metoprolol succinate (TOPROL XL) 100 mg tablet Take 1 Tab by mouth daily. 12/2/19   Martha Carreon MD   amLODIPine (NORVASC) 10 mg tablet Take 1 Tab by mouth daily. 11/13/19   Martha Carreon MD   ferrous sulfate 325 mg (65 mg iron) tablet Take 1 Tab by mouth Daily (before breakfast). 10/27/19   Orlando Martini MD   OTHER AFO  Right foot drop 3/26/19   Vaibhav Hillman MD   folic acid (FOLVITE) 1 mg tablet Take 1 Tab by mouth daily. 2/7/19   Vaibhav Hillman MD   rosuvastatin (CRESTOR) 10 mg tablet Take 1 Tab by mouth nightly. 1/28/19   Martha Carreon MD   clopidogrel (PLAVIX) 75 mg tab TAKE 1 TABLET BY MOUTH EVERY DAY 1/18/19   Martha Carreon MD   calcium acetate (PHOSLO) 667 mg cap Take  by mouth three (3) times daily (with meals).     Provider, Historical   cloNIDine HCl (CATAPRES) 0.3 mg tablet Take 1 Tab by mouth two (2) times a day. 11/30/18   Skiff, Twanda Ohms, NP   ubidecarenone (CO Q-10 PO) Take  by mouth daily. Provider, Historical   vitamin e (AQUA GEMS) 200 unit capsule Take  by mouth daily. Provider, Historical   cholecalciferol, VITAMIN D3, (VITAMIN D3) 5,000 unit tab tablet Take  by mouth daily. Provider, Historical   aspirin 81 mg chewable tablet Take 81 mg by mouth daily. Provider, Historical         Review of Systems   Genitourinary: Positive for dysuria and hematuria. No flowsheet data found. Rica Moon, who was evaluated through a patient-initiated, synchronous (real-time) audio only encounter, and/or her healthcare decision maker, is aware that it is a billable service, with coverage as determined by his insurance carrier. He provided verbal consent to proceed: Yes. He has not had a related appointment within my department in the past 7 days or scheduled within the next 24 hours.       Total Time: minutes: 21-30 minutes    Arvind Spring MD

## 2021-12-20 ENCOUNTER — VIRTUAL VISIT (OUTPATIENT)
Dept: INTERNAL MEDICINE CLINIC | Age: 61
End: 2021-12-20
Payer: MEDICARE

## 2021-12-20 DIAGNOSIS — D50.0 IRON DEFICIENCY ANEMIA DUE TO CHRONIC BLOOD LOSS: ICD-10-CM

## 2021-12-20 DIAGNOSIS — N18.6 ESRD (END STAGE RENAL DISEASE) (HCC): ICD-10-CM

## 2021-12-20 DIAGNOSIS — K25.4 GASTROINTESTINAL HEMORRHAGE ASSOCIATED WITH GASTRIC ULCER: Primary | ICD-10-CM

## 2021-12-20 PROCEDURE — 99214 OFFICE O/P EST MOD 30 MIN: CPT | Performed by: INTERNAL MEDICINE

## 2021-12-20 PROCEDURE — G8432 DEP SCR NOT DOC, RNG: HCPCS | Performed by: INTERNAL MEDICINE

## 2021-12-20 PROCEDURE — G9231 DOC ESRD DIA TRANS PREG: HCPCS | Performed by: INTERNAL MEDICINE

## 2021-12-20 PROCEDURE — 3017F COLORECTAL CA SCREEN DOC REV: CPT | Performed by: INTERNAL MEDICINE

## 2021-12-20 PROCEDURE — G8427 DOCREV CUR MEDS BY ELIG CLIN: HCPCS | Performed by: INTERNAL MEDICINE

## 2021-12-20 NOTE — PROGRESS NOTES
Zuly Masterson is a 57 y.o. male who was seen by synchronous (real-time) audio-video technology on 12/20/2021 for Hospital Follow Up (d/c 11/24)        Assessment & Plan:   Diagnoses and all orders for this visit:    1. Gastrointestinal hemorrhage associated with gastric ulcer  -     REFERRAL TO GASTROENTEROLOGY    2. ESRD (end stage renal disease) ([address]  Continue dialysis    3. Iron deficiency anemia due to chronic blood loss  Monitor CBC and iron level  Have copy of labs sent by dialysis            Subjective:     Patient attends visit with his wife. A lot of the history is per his wife    Patient was in hospital  from Nov 19 -24 for black stools and vomiting. Patient has ESRD and is on dialysis. He was very anemic hemoglobin in 6 range. He was transfused RBC. Patient had EGD which was positive H. Pylori and gastric ulcer, colonoscopy was within last few months. He was treated with triple antibiotic therapy    Patient says he is not currently on iron, says his hemoglobin is being checked at dialysis center  No more black stools since home from hospital. Completed therapy for H pylori  He has been placed on pantoprazole 40 mg bid for 2 weeks then advised to decrease to 1 tab daily  He will be tested for H pylori and blood in stool  Use tums and gaviscon as needed  GI is Dr. Lenore King    In hospital BP was high and hydralazine added back    He is not on plavix and asa- patient said he had not been taking it. He needs to follow up with neurologist.  Prior to Admission medications    Medication Sig Start Date End Date Taking? Authorizing Provider   metoprolol succinate (TOPROL XL) 100 mg tablet Take 1 Tab by mouth daily. 12/2/19  Yes Radha Carreon MD   amLODIPine (NORVASC) 10 mg tablet Take 1 Tab by mouth daily. 11/13/19  Yes Bernice Garland MD   ferrous sulfate 325 mg (65 mg iron) tablet Take 1 Tab by mouth Daily (before breakfast).  10/27/19  Yes Bernice Garland MD   OTHER AFO  Right foot drop 3/26/19  Yes Vaibhav Hillman MD   folic acid (FOLVITE) 1 mg tablet Take 1 Tab by mouth daily. 2/7/19  Yes Vaibhav Hillman MD   rosuvastatin (CRESTOR) 10 mg tablet Take 1 Tab by mouth nightly. 1/28/19  Yes Evelyne Pinto MD   clopidogrel (PLAVIX) 75 mg tab TAKE 1 TABLET BY MOUTH EVERY DAY 1/18/19  Yes Evelyne Pinto MD   calcium acetate (PHOSLO) 667 mg cap Take  by mouth three (3) times daily (with meals). Yes Provider, Historical   cloNIDine HCl (CATAPRES) 0.3 mg tablet Take 1 Tab by mouth two (2) times a day. 11/30/18  Yes Skiff, Pepper Deem, NP   ubidecarenone (CO Q-10 PO) Take  by mouth daily. Yes Provider, Historical   vitamin e (AQUA GEMS) 200 unit capsule Take  by mouth daily. Yes Provider, Historical   cholecalciferol, VITAMIN D3, (VITAMIN D3) 5,000 unit tab tablet Take  by mouth daily. Yes Provider, Historical   aspirin 81 mg chewable tablet Take 81 mg by mouth daily. Yes Provider, Historical         ROS    Objective:   No flowsheet data found.      [INSTRUCTIONS:  \"[x]\" Indicates a positive item  \"[]\" Indicates a negative item  -- DELETE ALL ITEMS NOT EXAMINED]    Constitutional: [x] Appears well-developed and well-nourished [x] No apparent distress      [] Abnormal -     Mental status: [x] Alert and awake  [x] Oriented to person/place/time [x] Able to follow commands    [] Abnormal -     Eyes:   EOM    [x]  Normal    [] Abnormal -   Sclera  [x]  Normal    [] Abnormal -          Discharge [x]  None visible   [] Abnormal -     HENT: [x] Normocephalic, atraumatic  [] Abnormal -   [x] Mouth/Throat: Mucous membranes are moist    External Ears [x] Normal  [] Abnormal -    Neck: [x] No visualized mass [] Abnormal -     Pulmonary/Chest: [x] Respiratory effort normal   [x] No visualized signs of difficulty breathing or respiratory distress        [] Abnormal -      Musculoskeletal:   [x] Normal gait with no signs of ataxia         [x] Normal range of motion of neck        [] Abnormal -     Neurological:        [x] No Facial Asymmetry (Cranial nerve 7 motor function) (limited exam due to video visit)          [x] No gaze palsy        [] Abnormal -          Skin:        [x] No significant exanthematous lesions or discoloration noted on facial skin         [] Abnormal -            Psychiatric:       [x] Normal Affect [] Abnormal -        [x] No Hallucinations    Other pertinent observable physical exam findings:-        We discussed the expected course, resolution and complications of the diagnosis(es) in detail. Medication risks, benefits, costs, interactions, and alternatives were discussed as indicated. I advised him to contact the office if his condition worsens, changes or fails to improve as anticipated. He expressed understanding with the diagnosis(es) and plan. Beau Lemons, was evaluated through a synchronous (real-time) audio-video encounter. The patient (or guardian if applicable) is aware that this is a billable service. Verbal consent to proceed has been obtained within the past 12 months. The visit was conducted pursuant to the emergency declaration under the [address] [place] [address] authority and the [place] [employer] General Act. Patient identification was verified, and a caregiver was present when appropriate. The patient was located in a state where the provider was credentialed to provide care.       Dain Rios MD

## 2021-12-20 NOTE — PROGRESS NOTES
Chief Complaint   Patient presents with   [facility] Follow Up     d/c 11/24     1. Have you been to the ER, urgent care clinic since your last visit? Hospitalized since your last visit? Yes JW- GI bleed    2. Have you seen or consulted any other health care providers outside of the [address] [place] [address] since your last visit? Include any pap smears or colon screening.  No

## 2022-01-31 ENCOUNTER — OFFICE VISIT (OUTPATIENT)
Dept: INTERNAL MEDICINE CLINIC | Age: 62
End: 2022-01-31
Payer: MEDICARE

## 2022-01-31 VITALS
RESPIRATION RATE: 18 BRPM | SYSTOLIC BLOOD PRESSURE: 139 MMHG | HEART RATE: 86 BPM | TEMPERATURE: 98.4 F | HEIGHT: 65 IN | OXYGEN SATURATION: 99 % | DIASTOLIC BLOOD PRESSURE: 75 MMHG | BODY MASS INDEX: 26.66 KG/M2 | WEIGHT: 160 LBS

## 2022-01-31 DIAGNOSIS — Z12.5 PROSTATE CANCER SCREENING: ICD-10-CM

## 2022-01-31 DIAGNOSIS — Z86.73 HISTORY OF STROKE: ICD-10-CM

## 2022-01-31 DIAGNOSIS — N18.6 ESRF (END STAGE RENAL FAILURE) (HCC): ICD-10-CM

## 2022-01-31 DIAGNOSIS — R10.9 FLANK PAIN: ICD-10-CM

## 2022-01-31 DIAGNOSIS — D50.0 IRON DEFICIENCY ANEMIA DUE TO CHRONIC BLOOD LOSS: ICD-10-CM

## 2022-01-31 DIAGNOSIS — E78.5 HYPERLIPIDEMIA LDL GOAL <100: ICD-10-CM

## 2022-01-31 DIAGNOSIS — I10 ESSENTIAL HYPERTENSION: Primary | ICD-10-CM

## 2022-01-31 PROCEDURE — G8427 DOCREV CUR MEDS BY ELIG CLIN: HCPCS | Performed by: INTERNAL MEDICINE

## 2022-01-31 PROCEDURE — 99214 OFFICE O/P EST MOD 30 MIN: CPT | Performed by: INTERNAL MEDICINE

## 2022-01-31 PROCEDURE — G9231 DOC ESRD DIA TRANS PREG: HCPCS | Performed by: INTERNAL MEDICINE

## 2022-01-31 PROCEDURE — G8432 DEP SCR NOT DOC, RNG: HCPCS | Performed by: INTERNAL MEDICINE

## 2022-01-31 PROCEDURE — 3017F COLORECTAL CA SCREEN DOC REV: CPT | Performed by: INTERNAL MEDICINE

## 2022-01-31 NOTE — PROGRESS NOTES
Emani Grodon is a 57 y.o. male    Chief Complaint   Patient presents with    Hypertension     1. Have you been to the ER, urgent care clinic since your last visit? Hospitalized since your last visit? No      2. Have you seen or consulted any other health care providers outside of the [address] [place] [address] since your last visit? Include any pap smears or colon screening.   No

## 2022-01-31 NOTE — PROGRESS NOTES
Subjective:      Dianne Moreno is a 57 y.o. male who presents today for   Chief Complaint   Patient presents with    Hypertension   needs to get booster vaccine  covid UTD  Flu UTD     1. History of Gastrointestinal hemorrhage associated with gastric ulcer  Appetite is good, no vomiting, bowel is not tarry and no blood     2. ESRD (end stage renal disease) ([facility] [address]  Continue dialysis   M/W/F     3. Iron deficiency anemia due to chronic blood loss  Monitor CBC and iron level  Have copy of recent labs sent by dialysis       4.hypertension- BP stable at 139/75    5. Hyperlipidemia- taking crestor    6. Hx of CVA stopped plavix and continues ASA 81 mg daily  Has been off of plavix for several months after the GI bleed    Patient Active Problem List    Diagnosis Date Noted    ESRD (end stage renal disease) ([facility] [address] 04/23/2019    Chest pain 10/10/2018    CKD (chronic kidney disease) stage 5, GFR less than 15 ml/min ([facility] [address] 10/02/2018    ESRF (end stage renal failure) ([facility])--for creation of AV fistula 09/26/2018    Iron deficiency anemia 04/27/2018    Acute gout 04/27/2018    Overweight (BMI 25.0-29.9) 03/21/2018    Essential hypertension 11/17/2017    History of nephrectomy 11/17/2017    Hyperlipidemia LDL goal <100 11/17/2017    History of stroke 11/17/2017    Sleep apnea 11/17/2017     Current Outpatient Medications   Medication Sig Dispense Refill    metoprolol succinate (TOPROL XL) 100 mg tablet Take 1 Tab by mouth daily. 90 Tab 1    amLODIPine (NORVASC) 10 mg tablet Take 1 Tab by mouth daily. 90 Tab 1    ferrous sulfate 325 mg (65 mg iron) tablet Take 1 Tab by mouth Daily (before breakfast). 90 Tab 0    OTHER AFO  Right foot drop 1 Units 0    folic acid (FOLVITE) 1 mg tablet Take 1 Tab by mouth daily. 30 Tab 1    rosuvastatin (CRESTOR) 10 mg tablet Take 1 Tab by mouth nightly. 90 Tab 1    calcium acetate (PHOSLO) 667 mg cap Take  by mouth three (3) times daily (with meals).       cloNIDine HCl (CATAPRES) 0.3 mg tablet Take 1 Tab by mouth two (2) times a day. 60 Tab 2    vitamin e (AQUA GEMS) 200 unit capsule Take  by mouth daily.  cholecalciferol, VITAMIN D3, (VITAMIN D3) 5,000 unit tab tablet Take  by mouth daily.  aspirin 81 mg chewable tablet Take 81 mg by mouth daily.  clopidogrel (PLAVIX) 75 mg tab TAKE 1 TABLET BY MOUTH EVERY DAY (Patient not taking: Reported on 2022) 90 Tab 1    ubidecarenone (CO Q-10 PO) Take  by mouth daily. (Patient not taking: Reported on 2022)       Allergies   Allergen Reactions    Chlorhexidine Itching     Past Medical History:   Diagnosis Date    Chronic kidney disease     dialysis/ [facility] -w-    Fatigue     Gout     Hypercholesterolemia     Hypertension     Sleep apnea     cpap    Snoring     Stroke ([address]     TIA, stroke ,      Past Surgical History:   Procedure Laterality Date    HX HEENT      tonsillectomy in     HX HEENT      nasal polypectomy    HX NEPHRECTOMY      kidney removed    HX VASCULAR ACCESS Left     neck for dialysis    VASCULAR SURGERY PROCEDURE UNLIST  01/15/2019    Creation of AV Fistula Left Arm    VASCULAR SURGERY PROCEDURE UNLIST  2019    TAVF left arm     Family History   Problem Relation Age of Onset    Heart Disease Mother     No Known Problems Father     Stroke Brother      Social History     Tobacco Use    Smoking status: Former Smoker     Packs/day: 1.50     Years: 40.00     Pack years: 60.00     Quit date: 10/10/2012     Years since quittin.3    Smokeless tobacco: Never Used   Substance Use Topics    Alcohol use: No        Review of Systems    A comprehensive review of systems was negative except for that written in the HPI.      Objective:     Visit Vitals  /75 (BP 1 Location: Right arm, BP Patient Position: Sitting)   Pulse 86   Temp 98.4 °F (36.9 °C) (Oral)   Resp 18   Ht 5' 5\" (1.651 m)   Wt 160 lb (72.6 kg)   SpO2 99%   BMI 26.63 kg/m²     General: Alert, cooperative, no distress, appears stated age. Head:  Normocephalic, without obvious abnormality, atraumatic. Eyes:  Conjunctivae/corneas clear. PERRL, EOMs intact. Ears:  Normal TMs and external ear canals both ears. Nose: Nares normal. Septum midline. Mucosa normal. No drainage or sinus tenderness. Neck: Supple, symmetrical, trachea midline, no adenopathy, thyroid: no enlargement/tenderness/nodules, no carotid bruit and no JVD. Back:   Symmetric, no curvature. ROM normal. No CVA tenderness. Lungs:   Clear to auscultation bilaterally. Chest wall:  No tenderness or deformity. Heart:  Regular rate and rhythm, S1, S2 normal, no murmur, click, rub or gallop. Abdomen:   Soft, non-tender. Bowel sounds normal. No masses,  No organomegaly. Extremities: Extremities normal, atraumatic, no cyanosis or edema. Pulses: 2+ and symmetric all extremities. Skin: Skin color, texture, turgor normal. No rashes or lesions. Neurologic: CNII-XII intact. Normal strength,       Assessment/Plan:       ICD-10-CM ICD-9-CM    1. Essential hypertension  A19 707.1 METABOLIC PANEL, COMPREHENSIVE      METABOLIC PANEL, COMPREHENSIVE  Continue current management   2. ESRF (end stage renal failure) (HCC)--for creation of AV fistula  T65.3 207.0 METABOLIC PANEL, COMPREHENSIVE      METABOLIC PANEL, COMPREHENSIVE  Continue scheduled dialysis   3. Hyperlipidemia LDL goal <100  E78.5 272.4 LIPID PANEL      LIPID PANEL   4. Iron deficiency anemia due to chronic blood loss  D50.0 280.0 CBC WITH AUTOMATED DIFF      IRON      IRON      CBC WITH AUTOMATED DIFF   5. History of stroke  Z86.73 V12.54 LIPID PANEL      LIPID PANEL  Takes Baby ASA   6. Flank pain  R10.9 789.09 URINALYSIS W/ RFLX MICROSCOPIC      CULTURE, URINE   7.  Prostate cancer screening  Z12.5 V76.44 PSA W/ REFLX FREE PSA      PSA W/ REFLX FREE PSA          Advised him to call back or return to office if symptoms worsen/change/persist.  Discussed expected course/resolution/complications of diagnosis in detail with patient. Medication risks/benefits/costs/interactions/alternatives discussed with patient. He was given an after visit summary which includes diagnoses, current medications, & vitals. He expressed understanding with the diagnosis and plan.

## 2022-02-01 LAB
ALBUMIN SERPL-MCNC: 4 G/DL (ref 3.5–5)
ALBUMIN/GLOB SERPL: 1.1 {RATIO} (ref 1.1–2.2)
ALP SERPL-CCNC: 58 U/L (ref 45–117)
ALT SERPL-CCNC: 10 U/L (ref 12–78)
ANION GAP SERPL CALC-SCNC: 6 MMOL/L (ref 5–15)
AST SERPL-CCNC: 16 U/L (ref 15–37)
BASOPHILS # BLD: 0.1 K/UL (ref 0–0.1)
BASOPHILS NFR BLD: 2 % (ref 0–1)
BILIRUB SERPL-MCNC: 0.5 MG/DL (ref 0.2–1)
BUN SERPL-MCNC: 37 MG/DL (ref 6–20)
BUN/CREAT SERPL: 5 (ref 12–20)
CALCIUM SERPL-MCNC: 9 MG/DL (ref 8.5–10.1)
CHLORIDE SERPL-SCNC: 98 MMOL/L (ref 97–108)
CHOLEST SERPL-MCNC: 280 MG/DL
CO2 SERPL-SCNC: 32 MMOL/L (ref 21–32)
CREAT SERPL-MCNC: 7.44 MG/DL (ref 0.7–1.3)
DIFFERENTIAL METHOD BLD: ABNORMAL
EOSINOPHIL # BLD: 1 K/UL (ref 0–0.4)
EOSINOPHIL NFR BLD: 15 % (ref 0–7)
ERYTHROCYTE [DISTWIDTH] IN BLOOD BY AUTOMATED COUNT: 14.9 % (ref 11.5–14.5)
GLOBULIN SER CALC-MCNC: 3.8 G/DL (ref 2–4)
GLUCOSE SERPL-MCNC: 119 MG/DL (ref 65–100)
HCT VFR BLD AUTO: 35.6 % (ref 36.6–50.3)
HDLC SERPL-MCNC: 46 MG/DL
HDLC SERPL: 6.1 {RATIO} (ref 0–5)
HGB BLD-MCNC: 10.9 G/DL (ref 12.1–17)
IMM GRANULOCYTES # BLD AUTO: 0.1 K/UL (ref 0–0.04)
IMM GRANULOCYTES NFR BLD AUTO: 1 % (ref 0–0.5)
IRON SERPL-MCNC: 74 UG/DL (ref 35–150)
LDLC SERPL CALC-MCNC: 161.2 MG/DL (ref 0–100)
LYMPHOCYTES # BLD: 0.8 K/UL (ref 0.8–3.5)
LYMPHOCYTES NFR BLD: 12 % (ref 12–49)
MCH RBC QN AUTO: 27 PG (ref 26–34)
MCHC RBC AUTO-ENTMCNC: 30.6 G/DL (ref 30–36.5)
MCV RBC AUTO: 88.3 FL (ref 80–99)
MONOCYTES # BLD: 0.6 K/UL (ref 0–1)
MONOCYTES NFR BLD: 9 % (ref 5–13)
NEUTS SEG # BLD: 3.8 K/UL (ref 1.8–8)
NEUTS SEG NFR BLD: 61 % (ref 32–75)
NRBC # BLD: 0 K/UL (ref 0–0.01)
NRBC BLD-RTO: 0 PER 100 WBC
PLATELET # BLD AUTO: 366 K/UL (ref 150–400)
PMV BLD AUTO: 9.7 FL (ref 8.9–12.9)
POTASSIUM SERPL-SCNC: 4.2 MMOL/L (ref 3.5–5.1)
PROT SERPL-MCNC: 7.8 G/DL (ref 6.4–8.2)
RBC # BLD AUTO: 4.03 M/UL (ref 4.1–5.7)
RBC MORPH BLD: ABNORMAL
SODIUM SERPL-SCNC: 136 MMOL/L (ref 136–145)
TRIGL SERPL-MCNC: 364 MG/DL (ref ?–150)
VLDLC SERPL CALC-MCNC: 72.8 MG/DL
WBC # BLD AUTO: 6.4 K/UL (ref 4.1–11.1)

## 2022-02-02 LAB
PSA SERPL-MCNC: 1.2 NG/ML (ref 0–4)
REFLEX CRITERIA: NORMAL

## 2022-03-18 PROBLEM — E66.3 OVERWEIGHT (BMI 25.0-29.9): Status: ACTIVE | Noted: 2018-03-21

## 2022-03-19 PROBLEM — R07.9 CHEST PAIN: Status: ACTIVE | Noted: 2018-10-10

## 2022-03-19 PROBLEM — G47.30 SLEEP APNEA: Status: ACTIVE | Noted: 2017-11-17

## 2022-03-19 PROBLEM — N18.5 CKD (CHRONIC KIDNEY DISEASE) STAGE 5, GFR LESS THAN 15 ML/MIN (HCC): Status: ACTIVE | Noted: 2018-10-02

## 2022-03-19 PROBLEM — I10 ESSENTIAL HYPERTENSION: Status: ACTIVE | Noted: 2017-11-17

## 2022-03-19 PROBLEM — E78.5 HYPERLIPIDEMIA LDL GOAL <100: Status: ACTIVE | Noted: 2017-11-17

## 2022-03-19 PROBLEM — M10.9 ACUTE GOUT: Status: ACTIVE | Noted: 2018-04-27

## 2022-03-19 PROBLEM — Z86.73 HISTORY OF STROKE: Status: ACTIVE | Noted: 2017-11-17

## 2022-03-19 PROBLEM — D50.9 IRON DEFICIENCY ANEMIA: Status: ACTIVE | Noted: 2018-04-27

## 2022-03-20 PROBLEM — Z90.5 HISTORY OF NEPHRECTOMY: Status: ACTIVE | Noted: 2017-11-17

## 2022-03-20 PROBLEM — N18.6 ESRD (END STAGE RENAL DISEASE) (HCC): Status: ACTIVE | Noted: 2019-04-23

## 2022-03-20 PROBLEM — N18.6 ESRF (END STAGE RENAL FAILURE) (HCC): Status: ACTIVE | Noted: 2018-09-26

## 2022-05-02 ENCOUNTER — OFFICE VISIT (OUTPATIENT)
Dept: INTERNAL MEDICINE CLINIC | Age: 62
End: 2022-05-02
Payer: MEDICARE

## 2022-05-02 VITALS
HEIGHT: 65 IN | DIASTOLIC BLOOD PRESSURE: 79 MMHG | HEART RATE: 85 BPM | WEIGHT: 166.1 LBS | BODY MASS INDEX: 27.67 KG/M2 | SYSTOLIC BLOOD PRESSURE: 149 MMHG | OXYGEN SATURATION: 97 % | RESPIRATION RATE: 16 BRPM

## 2022-05-02 DIAGNOSIS — M54.40 BILATERAL LOW BACK PAIN WITH SCIATICA, SCIATICA LATERALITY UNSPECIFIED, UNSPECIFIED CHRONICITY: Primary | ICD-10-CM

## 2022-05-02 DIAGNOSIS — M43.6 NECK STIFFNESS: ICD-10-CM

## 2022-05-02 DIAGNOSIS — R07.89 CHEST WALL PAIN: ICD-10-CM

## 2022-05-02 DIAGNOSIS — M54.2 NECK PAIN: ICD-10-CM

## 2022-05-02 DIAGNOSIS — M54.9 UPPER BACK PAIN: ICD-10-CM

## 2022-05-02 DIAGNOSIS — M25.512 LEFT SHOULDER PAIN, UNSPECIFIED CHRONICITY: ICD-10-CM

## 2022-05-02 PROCEDURE — 3017F COLORECTAL CA SCREEN DOC REV: CPT | Performed by: INTERNAL MEDICINE

## 2022-05-02 PROCEDURE — G8419 CALC BMI OUT NRM PARAM NOF/U: HCPCS | Performed by: INTERNAL MEDICINE

## 2022-05-02 PROCEDURE — G9231 DOC ESRD DIA TRANS PREG: HCPCS | Performed by: INTERNAL MEDICINE

## 2022-05-02 PROCEDURE — 99214 OFFICE O/P EST MOD 30 MIN: CPT | Performed by: INTERNAL MEDICINE

## 2022-05-02 PROCEDURE — G8428 CUR MEDS NOT DOCUMENT: HCPCS | Performed by: INTERNAL MEDICINE

## 2022-05-02 PROCEDURE — G8432 DEP SCR NOT DOC, RNG: HCPCS | Performed by: INTERNAL MEDICINE

## 2022-05-02 RX ORDER — CYCLOBENZAPRINE HCL 10 MG
TABLET ORAL
COMMUNITY
Start: 2022-04-04 | End: 2022-10-04

## 2022-05-02 RX ORDER — CYCLOBENZAPRINE HCL 10 MG
10 TABLET ORAL
Qty: 30 TABLET | Refills: 0 | Status: SHIPPED | OUTPATIENT
Start: 2022-05-02 | End: 2022-10-04

## 2022-05-02 RX ORDER — OMEPRAZOLE 20 MG/1
20 CAPSULE, DELAYED RELEASE ORAL DAILY
COMMUNITY
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
## 2022-05-17 ENCOUNTER — OFFICE VISIT (OUTPATIENT)
Dept: INTERNAL MEDICINE CLINIC | Age: 62
End: 2022-05-17
Payer: MEDICARE

## 2022-05-17 VITALS
WEIGHT: 166.7 LBS | BODY MASS INDEX: 27.77 KG/M2 | DIASTOLIC BLOOD PRESSURE: 85 MMHG | OXYGEN SATURATION: 97 % | SYSTOLIC BLOOD PRESSURE: 169 MMHG | TEMPERATURE: 98.3 F | HEIGHT: 65 IN | HEART RATE: 80 BPM | RESPIRATION RATE: 16 BRPM

## 2022-05-17 DIAGNOSIS — M62.838 TRAPEZIUS MUSCLE SPASM: ICD-10-CM

## 2022-05-17 DIAGNOSIS — M75.42 ROTATOR CUFF IMPINGEMENT SYNDROME OF LEFT SHOULDER: Primary | ICD-10-CM

## 2022-05-17 DIAGNOSIS — M25.512 ARTHRALGIA OF LEFT ACROMIOCLAVICULAR JOINT: ICD-10-CM

## 2022-05-17 PROCEDURE — G9231 DOC ESRD DIA TRANS PREG: HCPCS | Performed by: FAMILY MEDICINE

## 2022-05-17 PROCEDURE — 20552 NJX 1/MLT TRIGGER POINT 1/2: CPT | Performed by: FAMILY MEDICINE

## 2022-05-17 PROCEDURE — G8510 SCR DEP NEG, NO PLAN REQD: HCPCS | Performed by: FAMILY MEDICINE

## 2022-05-17 PROCEDURE — 3017F COLORECTAL CA SCREEN DOC REV: CPT | Performed by: FAMILY MEDICINE

## 2022-05-17 PROCEDURE — G8419 CALC BMI OUT NRM PARAM NOF/U: HCPCS | Performed by: FAMILY MEDICINE

## 2022-05-17 PROCEDURE — G8427 DOCREV CUR MEDS BY ELIG CLIN: HCPCS | Performed by: FAMILY MEDICINE

## 2022-05-17 PROCEDURE — 99214 OFFICE O/P EST MOD 30 MIN: CPT | Performed by: FAMILY MEDICINE

## 2022-05-17 RX ORDER — TRIAMCINOLONE ACETONIDE 40 MG/ML
40 INJECTION, SUSPENSION INTRA-ARTICULAR; INTRAMUSCULAR ONCE
Status: COMPLETED | OUTPATIENT
Start: 2022-05-17 | End: 2022-05-17

## 2022-05-17 RX ADMIN — TRIAMCINOLONE ACETONIDE 40 MG: 40 INJECTION, SUSPENSION INTRA-ARTICULAR; INTRAMUSCULAR at 19:01
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
## 2022-05-17 NOTE — PATIENT INSTRUCTIONS
If you have had an injection today, continue to rest the area for a few more days before resuming regular activities. It may be more painful for the first 1-2 days. NSAIDS are to be avoided. Watch for fever, or increased swelling or persistent pain in the joint. Call or return to clinic prn if such symptoms occur or there is failure to improve as anticipated. 1) Remember to stay active and/or exercise regularly (I suggest 30-45 minutes daily)   2) For reliable dietary information, go to www. EATRIGHT.org. You may wish to consider seeing the nutritionists:  Carly Cuba @ [facility] or [facility] - [phone] / [phone]  Or:  Radha Truong @ [facility] or [facility] - [phone],    - also consider the Mediterranean diet and DASH diets  3) I routinely suggest a complete physical exam once each year (your birth month)

## 2022-05-24 ENCOUNTER — HOSPITAL ENCOUNTER (OUTPATIENT)
Dept: PHYSICAL THERAPY | Age: 62
Discharge: HOME | End: 2022-05-24
Payer: MEDICARE

## 2022-05-24 PROCEDURE — 97161 PT EVAL LOW COMPLEX 20 MIN: CPT

## 2022-05-24 PROCEDURE — 97110 THERAPEUTIC EXERCISES: CPT
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
## 2022-06-07 ENCOUNTER — APPOINTMENT (OUTPATIENT)
Dept: PHYSICAL THERAPY | Age: 62
End: 2022-06-07
Payer: MEDICARE

## 2022-06-09 ENCOUNTER — APPOINTMENT (OUTPATIENT)
Dept: PHYSICAL THERAPY | Age: 62
End: 2022-06-09
Payer: MEDICARE

## 2022-06-14 ENCOUNTER — HOSPITAL ENCOUNTER (OUTPATIENT)
Dept: PHYSICAL THERAPY | Age: 62
End: 2022-06-14
Payer: MEDICARE

## 2022-06-16 ENCOUNTER — HOSPITAL ENCOUNTER (OUTPATIENT)
Dept: PHYSICAL THERAPY | Age: 62
Discharge: HOME | End: 2022-06-16
Payer: MEDICARE

## 2022-06-16 PROCEDURE — 97110 THERAPEUTIC EXERCISES: CPT | Performed by: PHYSICAL THERAPIST

## 2022-06-16 NOTE — PROGRESS NOTES
PT DAILY TREATMENT NOTE - [facility] 2-15    Patient Name: Fatou Hay  Date:2022  : 1960  [x]  Patient  Verified  Payor: Cade Callahan / Plan: Kimberly Moreno / Product Type: Managed Care Medicare /    In time: 5450P  Out time: 148p  Total Treatment Time (min): 50  Total Timed Codes (min): 40  1:1 Treatment Time ( [address] only): 40   Visit #:  2    Treatment Area: Other low back pain [M54.59]  Dorsalgia, unspecified [M54.9]  Neck stiffness [M43.6]  Cervicalgia [M54.2]  Other chest pain [R07.89]  Pain in left shoulder [M25.512]    SUBJECTIVE  Pain Level (0-10 scale): 3  Any medication changes, allergies to medications, adverse drug reactions, diagnosis change, or new procedure performed?: [x] No    [] Yes (see summary sheet for update)  Subjective functional status/changes:   [] No changes reported  Doing okay today. Back and neck overall have felt better.  Main area right now is the L flank/lat region, and the pec muscle on L    OBJECTIVE    Modality rationale: decrease pain and increase tissue extensibility to improve the patients ability to move without pain   Min Type Additional Details       [] Estim: []Att   []Unatt    []TENS instruct                  []IFC  []Premod   []NMES                     []Other:  []w/US   []w/ice   []w/heat  Position:  Location:       []  Traction: [] Cervical       []Lumbar                       [] Prone          []Supine                       []Intermittent   []Continuous Lbs:  [] before manual  [] after manual  []w/heat    []  Ultrasound: []Continuous   [] Pulsed                       at: []1MHz   []3MHz Location:  W/cm2:    [] Paraffin         Location:   []w/heat   5 []  Ice     [x]  Heat  []  Ice massage Position: reclined  Location: L shoulder    []  Laser  []  Other: Position:  Location:      []  Vasopneumatic Device Pressure:       [] lo [] med [] hi   Temperature:      [x] Skin assessment post-treatment:  [x]intact []redness- no adverse reaction []redness - adverse reaction:     40 min Therapeutic Exercise:  [x] See flow sheet :   Rationale: increase ROM, increase strength and improve coordination to improve the patients ability to move with reduced discomfort          With   [] TE   [] TA   [] Neuro   [] SC   [] other: Patient Education: [x] Review HEP    [] Progressed/Changed HEP based on:   [] positioning   [] body mechanics   [] transfers   [] heat/ice application    [] other:      Other Objective/Functional Measures: --     Pain Level (0-10 scale) post treatment: 2    ASSESSMENT/Changes in Function:   Responded well to QL and lat stretching today. Also worked on general LE flexibility with hamstring stretching. He has a notable drop foot and doesn't wear his AFO regularly secondary to discomfort, and will pursue an Xtern AFO for him. Patient will continue to benefit from skilled PT services to modify and progress therapeutic interventions, address functional mobility deficits, address ROM deficits, address strength deficits, analyze and address soft tissue restrictions and analyze and cue movement patterns to attain remaining goals. []  See Plan of Care  []  See progress note/recertification  []  See Discharge Summary         Progress towards goals / Updated goals:  Short Term Goals: To be accomplished in 1-2 weeks:  1) Pt will be independent with HEP MET  2) Pt will demonstrate understanding/application of postural principles without aggravation symptoms  3) Pt will report >/=25% decrease in symptoms PROGRESSING  4) Stand greater than 10 minutes without increase of pain   5) Pt will demonstrate proper abdominal brace sequencing and ability to hold >/= 10 seconds for improved core strength     Long Term Goals:  To be accomplished in 4-6 weeks:  1) Pt will report >/=50% decrease in symptoms    2) Pt will be increase cerv AROM rot to >/=60 degrees bilat so that can look over shoulders while driving without increase of neck pain  3) Pt will be able to stand greater than /= 15 minutes without increase pain so that can better anuj ADLs  5) Pt will demonstrate independence with modified exercise routine without aggravation of sx.     PLAN  [x]  Upgrade activities as tolerated     [x]  Continue plan of care  []  Update interventions per flow sheet       []  Discharge due to:_  []  Other:_      Marcella Tolliver, PT, DPT 6/16/2022

## 2022-06-21 ENCOUNTER — HOSPITAL ENCOUNTER (OUTPATIENT)
Dept: PHYSICAL THERAPY | Age: 62
Discharge: HOME | End: 2022-06-21
Payer: MEDICARE

## 2022-06-21 ENCOUNTER — OFFICE VISIT (OUTPATIENT)
Dept: INTERNAL MEDICINE CLINIC | Age: 62
End: 2022-06-21
Payer: MEDICARE

## 2022-06-21 VITALS
OXYGEN SATURATION: 98 % | HEIGHT: 65 IN | BODY MASS INDEX: 27.66 KG/M2 | RESPIRATION RATE: 16 BRPM | HEART RATE: 83 BPM | WEIGHT: 166 LBS | SYSTOLIC BLOOD PRESSURE: 145 MMHG | DIASTOLIC BLOOD PRESSURE: 85 MMHG | TEMPERATURE: 98.2 F

## 2022-06-21 DIAGNOSIS — M62.838 TRAPEZIUS MUSCLE SPASM: Primary | ICD-10-CM

## 2022-06-21 DIAGNOSIS — M75.42 ROTATOR CUFF IMPINGEMENT SYNDROME OF LEFT SHOULDER: ICD-10-CM

## 2022-06-21 PROCEDURE — G9231 DOC ESRD DIA TRANS PREG: HCPCS | Performed by: FAMILY MEDICINE

## 2022-06-21 PROCEDURE — G8510 SCR DEP NEG, NO PLAN REQD: HCPCS | Performed by: FAMILY MEDICINE

## 2022-06-21 PROCEDURE — 97110 THERAPEUTIC EXERCISES: CPT

## 2022-06-21 PROCEDURE — G8419 CALC BMI OUT NRM PARAM NOF/U: HCPCS | Performed by: FAMILY MEDICINE

## 2022-06-21 PROCEDURE — 3017F COLORECTAL CA SCREEN DOC REV: CPT | Performed by: FAMILY MEDICINE

## 2022-06-21 PROCEDURE — 99214 OFFICE O/P EST MOD 30 MIN: CPT | Performed by: FAMILY MEDICINE

## 2022-06-21 PROCEDURE — 20552 NJX 1/MLT TRIGGER POINT 1/2: CPT | Performed by: FAMILY MEDICINE

## 2022-06-21 PROCEDURE — G8427 DOCREV CUR MEDS BY ELIG CLIN: HCPCS | Performed by: FAMILY MEDICINE

## 2022-06-21 RX ORDER — TRIAMCINOLONE ACETONIDE 40 MG/ML
40 INJECTION, SUSPENSION INTRA-ARTICULAR; INTRAMUSCULAR ONCE
Status: COMPLETED | OUTPATIENT
Start: 2022-06-21 | End: 2022-06-21

## 2022-06-21 RX ADMIN — TRIAMCINOLONE ACETONIDE 40 MG: 40 INJECTION, SUSPENSION INTRA-ARTICULAR; INTRAMUSCULAR at 18:24

## 2022-06-21 NOTE — PROGRESS NOTES
Chief Complaint   Patient presents with    Shoulder Pain     Patient is here for left shoulder pain     he is a 57y.o. year old male who presents for follow up of injury. Follow Up Pain Assessment Encounter      Onset of Symptoms: Patient states he has had pain for months  ________________________________________________________________________  Description: Pain is now  has improved in some ways and worsened in others      Pain Scale:(1-10): 3  Duration:  intermittent   Radiation: shoulder,back  What makes it better?: exercise, heat and OTC meds  What makes it worse?:sitting, strecthing and walking  Medications tried: acetaminophen  Modalities tried: PT        Reviewed and agree with Nurse Note and duplicated in this note. Reviewed PmHx, RxHx, FmHx, SocHx, AllgHx and updated and dated in the chart.     Family History   Problem Relation Age of Onset    Heart Disease Mother     No Known Problems Father     Stroke Brother        Past Medical History:   Diagnosis Date    Chronic kidney disease     dialysis/  [facility] m-w-f    Fatigue     Gout     Hypercholesterolemia     Hypertension     Sleep apnea     cpap    Snoring     Stroke ([facility] [address]     TIA, stroke ,       Social History     Socioeconomic History    Marital status:    Tobacco Use    Smoking status: Former Smoker     Packs/day: 1.50     Years: 40.00     Pack years: 60.00     Quit date: 10/10/2012     Years since quittin.7    Smokeless tobacco: Never Used   Substance and Sexual Activity    Alcohol use: No    Drug use: No    Sexual activity: Yes     Partners: Female        Review of Systems - negative except as listed above      Objective:     Vitals:    22 1209   BP: (!) 145/85   Pulse: 83   Resp: 16   Temp: 98.2 °F (36.8 °C)   SpO2: 98%   Weight: 166 lb (75.3 kg)   Height: 5' 5\" (1.651 m)       Physical Examination: Physical Examination: General appearance - alert, well appearing, and in no distress  Back exam - full range of motion, no tenderness, palpable spasm or pain on motion  Neurological - alert, oriented, normal speech, no focal findings or movement disorder noted  Musculoskeletal - The left shoulder is  normal to inspection. The patient has full range of motion  . The shoulderis tender to palpation ac. Bicep tendon: non-tender  The NEER test is positive. The Zeng test:is positive   The Cross over test:is  negative. The Empty Can test:is  negative. Stressed ext rotation is:  negative. Stressed int rotation: negative. The Apprehension Sign is negative. The Lift off test is:  negative   Examination reveals the Painful Arch:  negative. The Labral Test is:  negative. The Sulcus Sign is:  negative. Extremities - peripheral pulses normal, no pedal edema, no clubbing or cyanosis  Skin - normal coloration and turgor, no rashes, no suspicious skin lesions noted    Time Out taken at:  12:23 PM  6/21/2022    * Patient was identified by name and date of birth   * Agreement on procedure being performed was verified  * Risks and Benefits explained to the patient  * Procedure site verified and marked as necessary  * Patient was positioned for comfort  * Consent was signed and verified   In the presence of: Witness: Feli  Injection #: 1  Needle:  27  Procedure: This procedure was discussed with Helen Banks and other therapeutic options were considered (risks vs benefits). Helen Banks and I thought that an injection was merited. After informed consent was obtained, landmarks were identified(marked), and the left trigger point  was cleansed with ChlorPrep in the standard sterile manner. 2mL  1% lidocaine  and  1mL Kenalog  was then injected and needle tenotomy was not performed. Procedure performed with ultrasound needle guidance. The needle was then withdrawn. T he procedure was well tolerated.   The patient is asked to continue to rest the area for a few more days before resuming regular activities. It may be more painful for the first 1-2 days. NSAIDS are to be avoided. Watch for fever, or increased swelling or persistent pain in the joint. Call or return to clinic prn if such symptoms occur or there is failure to improve as anticipated. The procedure did provide relief of symptoms in the clinic. RTC in 4 weeks for reevaluation and possible reinjection. Given the patient's body habitus and the anatomically deep nature of this structure, sonographic guidance is recommended to prevent injury to neurovascular structures and confirm accuracy of injection. Furthermore, this patient has failed conservative treatment with physical therapy and modalities and the diagnostic and therapeutic accuracy is important. Assessment/ Plan:   Diagnoses and all orders for this visit:    1. Trapezius muscle spasm  -     triamcinolone acetonide (KENALOG-40) 40 mg/mL injection 40 mg  -     REFERRAL TO PHYSICAL THERAPY  -     TX INJECT TRIGGER POINT, 1 OR 2    2. Rotator cuff impingement syndrome of left shoulder           Pathophysiology, recovery and rehabilitation process discussed and questions answered   Counseling for 30 Minutes of the total visit duration   Pictures and figures used as necessary   Provided reassurance   Monitor response to injection   Discussed steroid side effects of fat atrophy, hypopigmentation, steroid flare or infection               I have discussed the diagnosis with the patient and the intended plan as seen in the above orders. The patient has received an after-visit summary and questions were answered concerning future plans. Medication Side Effects and Warnings were discussed with patient,  Patient Labs were reviewed and or requested, and  Patient Past Records were reviewed and or requested  yes     Pt agrees to call or return to clinic and/or go to closest ER with any worsening of symptoms.   This may include, but not limited to increased fever (>100.4) with NSAIDS or Tylenol, increased edema, confusion, rash, worsening of presenting symptoms. Please note that this dictation was completed with [employer], the computer voice recognition software. Quite often unanticipated grammatical, syntax, homophones, and other interpretive errors are inadvertently transcribed by the computer software. Please disregard these errors. Please excuse any errors that have escaped final proofreading. Thank you.

## 2022-06-21 NOTE — PROGRESS NOTES
PT DAILY TREATMENT NOTE - [facility] 2-15    Patient Name: Danika Rodriguez  Date:2022  : 1960  [x]  Patient  Verified  Payor: Liam Chairez / Plan: Sumanth Search / Product Type: Managed Care Medicare /    In time: 12:44P  Out time: 1:27P  Total Treatment Time (min): 43  Total Timed Codes (min): 43  1:1 Treatment Time ( [address] only): 38   Visit #:  3    Treatment Area: Other low back pain [M54.59]  Dorsalgia, unspecified [M54.9]  Neck stiffness [M43.6]  Cervicalgia [M54.2]  Other chest pain [R07.89]  Pain in left shoulder [M25.512]    SUBJECTIVE  Pain Level (0-10 scale): 7/10  Any medication changes, allergies to medications, adverse drug reactions, diagnosis change, or new procedure performed?: [x] No    [] Yes (see summary sheet for update)  Subjective functional status/changes:   [] No changes reported  Pt reports tat R side of back and L shoulder hurt more today stated he received a steroid injection from Dr. Yanick Bonner prior to PT appointment today. OBJECTIVE    43 min Therapeutic Exercise:  [x] See flow sheet :   Rationale: increase ROM, increase strength and improve coordination to improve the patients ability to move with reduced discomfort          With   [] TE   [] TA   [] Neuro   [] SC   [] other: Patient Education: [x] Review HEP    [] Progressed/Changed HEP based on:   [] positioning   [] body mechanics   [] transfers   [] heat/ice application    [] other:      Other Objective/Functional Measures: --     Pain Level (0-10 scale) post treatment: 2/10    ASSESSMENT/Changes in Function:   Primary focus placed on back and core stabilization today with some gentle shoulder ROM with respect to recent injection given prior to PT.    Patient will continue to benefit from skilled PT services to modify and progress therapeutic interventions, address functional mobility deficits, address ROM deficits, address strength deficits, analyze and address soft tissue restrictions and analyze and cue movement patterns to attain remaining goals. []  See Plan of Care  []  See progress note/recertification  []  See Discharge Summary         Progress towards goals / Updated goals:  Short Term Goals: To be accomplished in 1-2 weeks:  1) Pt will be independent with HEP MET  2) Pt will demonstrate understanding/application of postural principles without aggravation symptoms  3) Pt will report >/=25% decrease in symptoms PROGRESSING  4) Stand greater than 10 minutes without increase of pain   5) Pt will demonstrate proper abdominal brace sequencing and ability to hold >/= 10 seconds for improved core strength     Long Term Goals: To be accomplished in 4-6 weeks:  1) Pt will report >/=50% decrease in symptoms    2) Pt will be increase cerv AROM rot to >/=60 degrees bilat so that can look over shoulders while driving without increase of neck pain  3) Pt will be able to stand greater than /= 15 minutes without increase pain so that can better anuj ADLs  5) Pt will demonstrate independence with modified exercise routine without aggravation of sx.     PLAN  [x]  Upgrade activities as tolerated     [x]  Continue plan of care  []  Update interventions per flow sheet       []  Discharge due to:_  []  Other:_      Henry Merlin, PTA 6/21/2022

## 2022-06-21 NOTE — PATIENT INSTRUCTIONS
Learning About Joint Injections  What are joint injections? Joint injections are shots into a joint, such as the knee or shoulder. They are used to put in medicines, such as pain relievers and steroid medicines. Steroids can be injected directly into a swollen and painful joint to reduce inflammation. A steroid shot can sometimes help with short-term pain relief when other treatments haven't worked. If steroid shots help, pain may improve for weeks or months. How are they done? First, the area over the joint will be cleaned. Your doctor may then use a tiny needle to numb the skin in the area where you will get the joint injection. If a tiny needle is used to numb the area, your doctor will use another needle to inject the medicine. Your doctor may use a pain reliever, a steroid, or both. You may feel some pressure or discomfort. The procedure takes 10 to 30 minutes. But the injection itself usually takes only a few minutes. Your doctor may put ice on the area before you go home. You will probably go home soon after your shot. What can you expect after a joint injection? You may have numbness around the joint for a few hours. If your shot included both a pain reliever and a steroid, then the pain will probably go away right away. But it might come back after a few hours. This might happen if the pain reliever wears off and the steroid hasn't started to work yet. Steroids don't always work. But when they do, the pain relief can last for several days to a few months or longer. Your doctor may tell you to use ice on the area. You can also use ice if the pain comes back. Put ice or a cold pack on your joint for 10 to 20 minutes at a time. Put a thin cloth between the ice and your skin. Follow your doctor's instructions carefully. Follow-up care is a key part of your treatment and safety. Be sure to make and go to all appointments, and call your doctor if you are having problems.  It's also a good idea to know your test results and keep a list of the medicines you take. Where can you learn more? Go to http://www.gray.com/. Enter Z555 in the search box to learn more about \"Learning About Joint Injections. \"  Current as of: September 20, 2018  Content Version: 11.9  © 4377-5634 [employer], [employer]. Care instructions adapted under license by [employer] (which disclaims liability or warranty for this information). If you have questions about a medical condition or this instruction, always ask your healthcare professional. [address] any warranty or liability for your use of this information.

## 2022-06-23 ENCOUNTER — APPOINTMENT (OUTPATIENT)
Dept: PHYSICAL THERAPY | Age: 62
End: 2022-06-23
Payer: MEDICARE

## 2022-06-28 ENCOUNTER — HOSPITAL ENCOUNTER (OUTPATIENT)
Dept: PHYSICAL THERAPY | Age: 62
Discharge: HOME | End: 2022-06-28
Payer: MEDICARE

## 2022-06-28 PROCEDURE — 97110 THERAPEUTIC EXERCISES: CPT | Performed by: PHYSICAL THERAPIST

## 2022-06-28 PROCEDURE — 97140 MANUAL THERAPY 1/> REGIONS: CPT | Performed by: PHYSICAL THERAPIST

## 2022-06-28 NOTE — PROGRESS NOTES
PT DAILY TREATMENT NOTE - [facility] 2-15    Patient Name: Therafaelaor Batch  Date:2022  : 1960  [x]  Patient  Verified  Payor: Janie Smith / Plan: Jacolyn Canavan / Product Type: Managed Care Medicare /    In time: 132p  Out time: 215p   Total Treatment Time (min): 43  Total Timed Codes (min): 43  1:1 Treatment Time ( [address] only): 43   Visit #:  4    Treatment Area: Other low back pain [M54.59]  Dorsalgia, unspecified [M54.9]  Neck stiffness [M43.6]  Cervicalgia [M54.2]  Other chest pain [R07.89]  Pain in left shoulder [M25.512]    SUBJECTIVE  Pain Level (0-10 scale): 6  Any medication changes, allergies to medications, adverse drug reactions, diagnosis change, or new procedure performed?: [x] No    [] Yes (see summary sheet for update)  Subjective functional status/changes:   [] No changes reported  A little better today. Back and shoulder still the biggest issue. Pain worst in the back side of the shoulder. OBJECTIVE    33 min Therapeutic Exercise:  [x]? See flow sheet :   Rationale: increase ROM, increase strength and improve coordination to improve the patients ability to move with reduced discomfort         10 min Manual Therapy: GH post. Mobs, anterior shoulder STM    Rationale: decrease pain, increase ROM, increase tissue extensibility and decrease trigger points to improve the patients ability to reach without pain                                                            With   []? TE   []? TA   []? Neuro   []? SC   []? other: Patient Education: [x]? Review HEP    []? Progressed/Changed HEP based on:   []? positioning   []? body mechanics   []? transfers   []? heat/ice application    []? other:       Other Objective/Functional Measures: --        Pain Level (0-10 scale) post treatment: 5/10     ASSESSMENT/Changes in Function:   Felt good with stretch. Still awaiting response regarding xtern AFO. Potentially d/c at next session.    Patient will continue to benefit from skilled PT services to modify and progress therapeutic interventions, address functional mobility deficits, address ROM deficits, address strength deficits, analyze and address soft tissue restrictions and analyze and cue movement patterns to attain remaining goals. []? See Plan of Care  []? See progress note/recertification  []? See Discharge Summary         Progress towards goals / Updated goals:  Short Term Goals: To be accomplished in 1-2 weeks:  1) Pt will be independent with HEP MET  2) Pt will demonstrate understanding/application of postural principles without aggravation symptoms  3) Pt will report >/=25% decrease in symptoms PROGRESSING  4) Stand greater than 10 minutes without increase of pain   5) Pt will demonstrate proper abdominal brace sequencing and ability to hold >/= 10 seconds for improved core strength     Long Term Goals: To be accomplished in 4-6 weeks:  1) Pt will report >/=50% decrease in symptoms    2) Pt will be increase cerv AROM rot to >/=60 degrees bilat so that can look over shoulders while driving without increase of neck pain  3) Pt will be able to stand greater than /= 15 minutes without increase pain so that can better anuj ADLs  5) Pt will demonstrate independence with modified exercise routine without aggravation of sx.     PLAN  [x]? Upgrade activities as tolerated     [x]? Continue plan of care  []? Update interventions per flow sheet       []? Discharge due to:_  []?   Other:_      Pedro Lazaro, PT, DPT 6/28/2022

## 2022-06-30 ENCOUNTER — HOSPITAL ENCOUNTER (OUTPATIENT)
Dept: PHYSICAL THERAPY | Age: 62
Discharge: HOME | End: 2022-06-30
Payer: MEDICARE

## 2022-06-30 PROCEDURE — 97140 MANUAL THERAPY 1/> REGIONS: CPT | Performed by: PHYSICAL THERAPIST

## 2022-06-30 PROCEDURE — 97110 THERAPEUTIC EXERCISES: CPT | Performed by: PHYSICAL THERAPIST

## 2022-06-30 NOTE — PROGRESS NOTES
PT DAILY TREATMENT NOTE - [facility] 2-15    Patient Name: Beau Lemons  Date:2022  : 1960  [x]  Patient  Verified  Payor: Mike Johnson / Plan: Markie Lopez / Product Type: Managed Care Medicare /    In time: 115p  Out time: 205p  Total Treatment Time (min): 50  Total Timed Codes (min): 44  1:1 Treatment Time ([facility] only): 39   Visit #:  5    Treatment Area: Other low back pain [M54.59]  Dorsalgia, unspecified [M54.9]  Neck stiffness [M43.6]  Cervicalgia [M54.2]  Other chest pain [R07.89]  Pain in left shoulder [M25.512]    SUBJECTIVE  Pain Level (0-10 scale): 7  Any medication changes, allergies to medications, adverse drug reactions, diagnosis change, or new procedure performed?: [x] No    [] Yes (see summary sheet for update)  Subjective functional status/changes:   [] No changes reported  Shoulder is getting better; back is still bothering him. OBJECTIVE  Modality rationale: decrease pain and increase tissue extensibility to improve the patients ability to move arm without pain   Min Type Additional Details       [] Estim: []Att   []Unatt    []TENS instruct                  []IFC  []Premod   []NMES                     []Other:  []w/US   []w/ice   []w/heat  Position:  Location:       []  Traction: [] Cervical       []Lumbar                       [] Prone          []Supine                       []Intermittent   []Continuous Lbs:  [] before manual  [] after manual  []w/heat    []  Ultrasound: []Continuous   [] Pulsed                       at: []1MHz   []3MHz Location:  W/cm2:    [] Paraffin         Location:   []w/heat   6 []  Ice     [x]  Heat  []  Ice massage Position:  Location: L shoulder    []  Laser  []  Other: Position:  Location:      []  Vasopneumatic Device Pressure:       [] lo [] med [] hi   Temperature:      [x] Skin assessment post-treatment:  [x]intact []redness- no adverse reaction    []redness - adverse reaction:     34 min Therapeutic Exercise:  [x]? ? See flow sheet :   Rationale: increase ROM, increase strength and improve coordination to improve the patients ability to move with reduced discomfort         10 min Manual Therapy: sidelying rib compression to R side, sidelying gapping lumbar stretch to R side    Rationale: decrease pain, increase ROM, increase tissue extensibility and decrease trigger points to improve the patients ability to reach without pain                                                            With   []?? TE   []?? TA   []? ? Neuro   []?? SC   []?? other: Patient Education: [x]? ? Review HEP    []? ? Progressed/Changed HEP based on:   []?? positioning   []? ? body mechanics   []? ? transfers   []? ? heat/ice application    []? ? other:       Other Objective/Functional Measures: --        Pain Level (0-10 scale) post treatment: 4/10     ASSESSMENT/Changes in Function:    []??  See Plan of Care  [x]? ?  See progress note/recertification  []? ?  See Discharge Summary            PLAN  [x]? ?  Upgrade activities as tolerated     [x]? ?  Continue plan of care  []? ?  Update interventions per flow sheet       []? ?  Discharge due to:_  []??  Other:_          Jose Patient, PT, DPT 6/30/2022
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
## 2022-07-12 ENCOUNTER — APPOINTMENT (OUTPATIENT)
Dept: PHYSICAL THERAPY | Age: 62
End: 2022-07-12
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
## 2022-10-04 ENCOUNTER — OFFICE VISIT (OUTPATIENT)
Dept: INTERNAL MEDICINE CLINIC | Age: 62
End: 2022-10-04
Payer: MEDICARE

## 2022-10-04 DIAGNOSIS — N18.6 ESRD (END STAGE RENAL DISEASE) (HCC): ICD-10-CM

## 2022-10-04 DIAGNOSIS — Z86.73 HISTORY OF COMPLETED STROKE: Primary | ICD-10-CM

## 2022-10-04 DIAGNOSIS — I10 ESSENTIAL HYPERTENSION: ICD-10-CM

## 2022-10-04 DIAGNOSIS — E78.5 HYPERLIPIDEMIA, UNSPECIFIED HYPERLIPIDEMIA TYPE: ICD-10-CM

## 2022-10-04 DIAGNOSIS — G47.33 OBSTRUCTIVE SLEEP APNEA SYNDROME: ICD-10-CM

## 2022-10-04 DIAGNOSIS — Z23 ENCOUNTER FOR IMMUNIZATION: ICD-10-CM

## 2022-10-04 PROCEDURE — 90686 IIV4 VACC NO PRSV 0.5 ML IM: CPT | Performed by: FAMILY MEDICINE

## 2022-10-04 PROCEDURE — G0008 ADMIN INFLUENZA VIRUS VAC: HCPCS | Performed by: FAMILY MEDICINE

## 2022-10-04 PROCEDURE — 99214 OFFICE O/P EST MOD 30 MIN: CPT | Performed by: FAMILY MEDICINE

## 2022-10-04 PROCEDURE — G9231 DOC ESRD DIA TRANS PREG: HCPCS | Performed by: FAMILY MEDICINE

## 2022-10-04 PROCEDURE — G8427 DOCREV CUR MEDS BY ELIG CLIN: HCPCS | Performed by: FAMILY MEDICINE

## 2022-10-04 PROCEDURE — 3017F COLORECTAL CA SCREEN DOC REV: CPT | Performed by: FAMILY MEDICINE

## 2022-10-04 PROCEDURE — G8419 CALC BMI OUT NRM PARAM NOF/U: HCPCS | Performed by: FAMILY MEDICINE

## 2022-10-04 PROCEDURE — G8510 SCR DEP NEG, NO PLAN REQD: HCPCS | Performed by: FAMILY MEDICINE

## 2022-10-04 RX ORDER — METOPROLOL TARTRATE 25 MG/1
TABLET, FILM COATED ORAL
COMMUNITY
Start: 2022-09-21 | End: 2022-10-26 | Stop reason: SDUPTHER

## 2022-10-04 RX ORDER — PHENYTOIN 125 MG/5ML
SUSPENSION ORAL
COMMUNITY
Start: 2022-10-03

## 2022-10-04 RX ORDER — PHENYTOIN SODIUM 100 MG/1
CAPSULE, EXTENDED RELEASE ORAL
COMMUNITY
Start: 2022-09-22 | End: 2022-10-24 | Stop reason: SDUPTHER

## 2022-10-04 RX ORDER — MIRTAZAPINE 15 MG/1
TABLET, FILM COATED ORAL
COMMUNITY
Start: 2022-09-22 | End: 2022-10-24 | Stop reason: SDUPTHER

## 2022-10-04 NOTE — PROGRESS NOTES
SPORTS MEDICINE AND PRIMARY CARE  Marisabel Painting MD  [address]  [id number]    Chief Complaint   Patient presents with    Hospital Follow Up       SUBJECTIVE:    Juli Graham is a 60 y.o. male for new patient evaluation. PMH of CVA, HTN, ESRD, sleep apnea, nephrectomy, HLD, IGOR, gout, overweight. CVA 8/22   rt temporal and occipital lobes  Spent time in rehab ctr  Largely wheelchair bound  Now at home with [facility]  Wife very involved  Works with ST, PT, OT  Neuro appt pending    HLD- on crestor    ESDR x 4 yr  Dialysis m-w-f    Sleep apnea, cpap stopped post cva equipment issues    CRC screening utd  2 lifetime colonoscopies    Ferritin high  Iron being held    Senna+ for constipation  Sleep and appetite are good    Current Outpatient Medications   Medication Sig Dispense Refill    mirtazapine (REMERON) 15 mg tablet TAKE 1/2 TABLET BY MOUTH AT BEDTIME      phenytoin ER (DILANTIN ER) 100 mg ER capsule TAKE 1 CAPSULE BY MOUTH EVERY 8 HOURS      omeprazole (PRILOSEC) 20 mg capsule Take 20 mg by mouth daily. metoprolol succinate (TOPROL XL) 100 mg tablet Take 1 Tab by mouth daily. 90 Tab 1    OTHER AFO  Right foot drop 1 Units 0    clopidogrel (PLAVIX) 75 mg tab TAKE 1 TABLET BY MOUTH EVERY DAY 90 Tab 1    calcium acetate (PHOSLO) 667 mg cap Take  by mouth three (3) times daily (with meals). aspirin 81 mg chewable tablet Take 81 mg by mouth daily. phenytoin (DILANTIN) suspension       metoprolol tartrate (LOPRESSOR) 25 mg tablet TAKE 1/2 TABLET BY MOUTH TWICE A DAY      ferrous sulfate 325 mg (65 mg iron) tablet Take 1 Tab by mouth Daily (before breakfast). 90 Tab 0    folic acid (FOLVITE) 1 mg tablet Take 1 Tab by mouth daily. 30 Tab 1    rosuvastatin (CRESTOR) 10 mg tablet Take 1 Tab by mouth nightly.  (Patient not taking: Reported on 10/4/2022) 90 Tab 1     Past Medical History:   Diagnosis Date    Chronic kidney disease     [facility]    Fatigue     Gout Hypercholesterolemia     Hypertension     Sleep apnea     cpap    Snoring     Stroke ([facility] [address]     TIA, stroke 2007, 2012     Past Surgical History:   Procedure Laterality Date    HX HEENT      tonsillectomy in 2002    HX HEENT      nasal polypectomy    HX NEPHRECTOMY  1980    kidney removed    HX VASCULAR ACCESS Left     neck for dialysis    VASCULAR SURGERY PROCEDURE UNLIST  01/15/2019    Creation of AV Fistula Left Arm    VASCULAR SURGERY PROCEDURE UNLIST  04/23/2019    TAVF left arm     Allergies   Allergen Reactions    Chlorhexidine Gluconate Other (comments)    Lisinopril Other (comments)    Chlorhexidine Itching       REVIEW OF SYSTEMS:  General: negative for - chills or fever  ENT: negative for - headaches, nasal congestion, tinnitus, hearing loss, vision changes, sore throat  Respiratory: negative for - cough, hemoptysis, shortness of breath or wheezing  Cardiovascular : negative for - chest pain, edema, palpitations or shortness of breath  Gastrointestinal: +constipation; negative for - abdominal pain, blood in stools, heartburn or nausea/vomiting, diarrhea,   Genito-Urinary: no dysuria, trouble voiding, hematuria or erectile dysfunction  Musculoskeletal: negative for - gait disturbance, joint pain, joint stiffness , joint swelling, muscle aches  Neurological: +stroke symptoms  Hematologic: no bruises, no bleeding, no swollen glands  Integument: no lumps, mole changes, nail changes or rash  Endocrine:no malaise/lethargy or unexpected weight changes      Social History     Socioeconomic History    Marital status:    Tobacco Use    Smoking status: Former     Packs/day: 1.50     Years: 40.00     Pack years: 60.00     Types: Cigarettes     Quit date: 10/10/2012     Years since quitting: 10.0    Smokeless tobacco: Never   Substance and Sexual Activity    Alcohol use: No    Drug use: No    Sexual activity: Yes     Partners: Female     Family History   Problem Relation Age of Onset    Heart Disease Mother No Known Problems Father     Stroke Brother        OBJECTIVE:     There were no vitals taken for this visit. CONSTITUTIONAL: seated in wheel chair,non-verbal, using keypad for communication  EYES:  eom intact  ENMT:moist mucous membranes  NECK: supple. Thyroid normal  RESPIRATORY: Chest: clear bilaterally  CARDIOVASCULAR: Heart: regular rate and rhythm, pulse 1+   MUSCULOSKELETAL: Extremities: no edema,   INTEGUMENT: warm and dry  NEUROLOGIC: non-focal exam   MENTAL STATUS: alert and oriented, appropriate affect         ASSESSMENT/ PLAN:  1. History of completed stroke -stable, neuro follow up   2. Obstructive sleep apnea syndrome    3. Essential hypertension at goal (not documented by nurse), no mgt changes   4. ESRD (end stage renal disease) ([facility] [address] -stable, DD m-w-f   5. Hyperlipidemia, unspecified hyperlipidemia type -stable statin use   6. Encounter for immunization      . Orders Placed This Encounter    Influenza, FLUARIX, FLULAVAL, FLUZONE (age 10 mo+), AFLURIA (age 3y+) IM, PF, 0.5 mL    SLEEP MEDICINE REFERRAL    mirtazapine (REMERON) 15 mg tablet    phenytoin (DILANTIN) suspension    phenytoin ER (DILANTIN ER) 100 mg ER capsule    metoprolol tartrate (LOPRESSOR) 25 mg tablet   RTO 3-6 mo        I have discussed the diagnosis with the patient and the intended plan as seen in the  orders above. The patient understands and agrees with the plan. The patient has   received an after visit summary. Questions were answered concerning  future plans  Patient labs and/or xrays were reviewed as available. Past records were reviewed as available. Counseled regarding diet, exercise and healthy lifestyle          Advised patient to proceed to urgent care, call back or return to office if symptoms develop/worsen/change/persist.  Discussed expected course/resolution/complications of diagnosis in detail with patient.      Medication risks/benefits/costs/interactions/alternatives reviewed    Signed,  Gamaliel Fatima M.D. Severo Bills total of at least 40 min was spent during this evaluation of which half was spent in counseling and care coordination. This note was created using voice recognition software.   Edits have been made but syntax errors might exist.

## 2022-10-04 NOTE — PROGRESS NOTES
Identified pt with two pt identifiers(name and ). Reviewed record in preparation for visit and have obtained necessary documentation. All patient medications has been reviewed. Chief Complaint   Patient presents with    Hospital Follow Up       3 most recent PHQ Screens 10/4/2022   Little interest or pleasure in doing things Several days   Feeling down, depressed, irritable, or hopeless Several days   Total Score PHQ 2 2     Abuse Screening Questionnaire 10/4/2022   Do you ever feel afraid of your partner? N   Are you in a relationship with someone who physically or mentally threatens you? N   Is it safe for you to go home? Y       Health Maintenance Due   Topic    Hepatitis C Screening     Shingrix Vaccine Age 50> (1 of 2)    Colorectal Cancer Screening Combo     Low dose CT lung screening     Medicare Yearly Exam     COVID-19 Vaccine (2 - Pfizer series)     Health Maintenance Review: Patient reminded of \"due or due soon\" health maintenance. I have asked the patient to contact his/her primary care provider (PCP) for follow-up on his/her health maintenance. There were no vitals filed for this visit. Wt Readings from Last 3 Encounters:   22 166 lb (75.3 kg)   22 166 lb 11.2 oz (75.6 kg)   22 166 lb 1.6 oz (75.3 kg)     Temp Readings from Last 3 Encounters:   22 98.2 °F (36.8 °C)   22 98.3 °F (36.8 °C)   22 98.4 °F (36.9 °C) (Oral)     BP Readings from Last 3 Encounters:   22 (!) 145/85   22 (!) 169/85   22 (!) 149/79     Pulse Readings from Last 3 Encounters:   22 83   22 80   22 85       1. \"Have you been to the ER, urgent care clinic since your last visit? Hospitalized since your last visit? \" No    2. \"Have you seen or consulted any other health care providers outside of the [address] [place] [address] since your last visit? \" No       I

## 2022-10-07 DIAGNOSIS — Z86.73 HISTORY OF CVA (CEREBROVASCULAR ACCIDENT): Primary | ICD-10-CM

## 2022-10-11 ENCOUNTER — PATIENT MESSAGE (OUTPATIENT)
Dept: SLEEP MEDICINE | Age: 62
End: 2022-10-11

## 2022-10-13 ENCOUNTER — PATIENT MESSAGE (OUTPATIENT)
Dept: SLEEP MEDICINE | Age: 62
End: 2022-10-13

## 2022-10-29 RX ORDER — METOPROLOL TARTRATE 25 MG/1
TABLET, FILM COATED ORAL
Qty: 90 TABLET | Refills: 3 | Status: SHIPPED | OUTPATIENT
Start: 2022-10-29

## 2022-11-01 ENCOUNTER — OFFICE VISIT (OUTPATIENT)
Dept: SLEEP MEDICINE | Age: 62
End: 2022-11-01
Payer: MEDICARE

## 2022-11-01 VITALS
HEART RATE: 71 BPM | TEMPERATURE: 97.5 F | DIASTOLIC BLOOD PRESSURE: 89 MMHG | OXYGEN SATURATION: 97 % | BODY MASS INDEX: 22.77 KG/M2 | HEIGHT: 65 IN | SYSTOLIC BLOOD PRESSURE: 144 MMHG | WEIGHT: 136.69 LBS

## 2022-11-01 DIAGNOSIS — I10 ESSENTIAL HYPERTENSION: ICD-10-CM

## 2022-11-01 DIAGNOSIS — G47.33 OSA (OBSTRUCTIVE SLEEP APNEA): Primary | ICD-10-CM

## 2022-11-01 DIAGNOSIS — Z86.73 H/O: STROKE: ICD-10-CM

## 2022-11-01 PROCEDURE — G8432 DEP SCR NOT DOC, RNG: HCPCS | Performed by: INTERNAL MEDICINE

## 2022-11-01 PROCEDURE — 3078F DIAST BP <80 MM HG: CPT | Performed by: INTERNAL MEDICINE

## 2022-11-01 PROCEDURE — 3017F COLORECTAL CA SCREEN DOC REV: CPT | Performed by: INTERNAL MEDICINE

## 2022-11-01 PROCEDURE — 3074F SYST BP LT 130 MM HG: CPT | Performed by: INTERNAL MEDICINE

## 2022-11-01 PROCEDURE — 99204 OFFICE O/P NEW MOD 45 MIN: CPT | Performed by: INTERNAL MEDICINE

## 2022-11-01 PROCEDURE — G8420 CALC BMI NORM PARAMETERS: HCPCS | Performed by: INTERNAL MEDICINE

## 2022-11-01 PROCEDURE — G8427 DOCREV CUR MEDS BY ELIG CLIN: HCPCS | Performed by: INTERNAL MEDICINE

## 2022-11-01 PROCEDURE — G9231 DOC ESRD DIA TRANS PREG: HCPCS | Performed by: INTERNAL MEDICINE

## 2022-11-01 RX ORDER — HYDRALAZINE HYDROCHLORIDE 25 MG/1
TABLET, FILM COATED ORAL
COMMUNITY
Start: 2022-10-17

## 2022-11-01 NOTE — PROGRESS NOTES
[address] [facility] [place]. [place], [address]  Tel.  [phone]  Fax. [address] [facility] [address]  [place], [address] [facility]  Tel.  [phone]  Fax. [phone] [address]   Tel.  [phone]  Fax. [phone]       Danica Sanders is a 60y.o. year old male seen for evaluation of a sleep disorder. ASSESSMENT/PLAN:      ICD-10-CM ICD-9-CM    1. BRISSA (obstructive sleep apnea)  G47.33 327.23 POLYSOMNOGRAPHY 1 NIGHT      2. Essential hypertension  I10 401.9       3. H/O: stroke  Z86.73 V12.54       4. BMI 22.0-22.9, adult  K37.44 V85.1           Patient has a history and examination consistent with the diagnosis of sleep apnea. Follow-up and Dispositions    Return for telephone follow-up after testing is completed. * The patient currently has a High Risk for having sleep apnea. STOP-BANG score 6.    * Sleep testing was ordered for evaluation due to interim weight loss since last testing. Orders Placed This Encounter    POLYSOMNOGRAPHY 1 NIGHT     Standing Status:   Future     Standing Expiration Date:   2/1/2023     Order Specific Question:   Reason for Exam     Answer:   BRISSA         * He was provided information on sleep apnea including corresponding risk factors and the importance of proper treatment. * Treatment options were reviewed in detail. he would like to proceed with PAP therapy (new device versus repair of old device). * The patient was counseled regarding proper sleep hygiene, with emphasis on ensuring sufficient total sleep time; safe driving and the benefits of exercise and weight loss. * All of his questions were addressed. 2. Hypertension -  continue on current regimen - metoprolol tartrate (LOPRESSOR) 25 mg tablet and hydrALAZINE (APRESOLINE) 25 mg tablet, he will continue to monitor his BP and follow up with his primary care provider for reevaluation/adjustment of medications if warranted.   I have reviewed the relationship between hypertension as it relates to sleep-disordered breathing. 3. H/O Stroke -  continue on current regimen, he will continue to monitor his symptoms (BE FAST) acronym reviewed. I have reviewed the relationship between stroke as it relates to sleep-disordered breathing. SUBJECTIVE/OBJECTIVE:    Lex Pepe is an 60 y.o. male referred for evaluation for a sleep disorder. He complains of snoring associated with periods of not breathing. Symptoms began several years ago, he was diagnosed with BRISSA in 2018 and prescribed APAP therapy which as been used sporadically since that time. His device fell and is broken in two pieces. He usually can fall asleep in 10 minutes. Family or house members note snoring, periods of not breathing. Lex Pepe does wake up frequently at night. He is bothered by waking up too early and left unable to get back to sleep. He actually sleeps about 8 hours at night and wakes up about 3 times during the night. He does not work shifts:  . Isai Hurley indicates he does not get too little sleep at night. His bedtime is 2000. He awakens at 0800. He does take naps. He takes 2 naps a week lasting 1 hour. He has the following observed behaviors: Loud snoring, Light snoring, Sleep talking;  . Other remarks: The patient has undergone diagnostic testing for the current problems. Review of Systems:  Constitutional:  Significant weight loss 179 to 136 lbs. Eyes:  No blurred vision  CVS:  No significant chest pain  Pulm:  No significant shortness of breath  GI:  No significant nausea or vomiting  :  No significant nocturia  Musculoskeletal:  No significant joint pain at night  Skin:  No significant rashes  Neuro:  No significant dizziness   Psych:  No active mood issues    Sleep Review of Systems: notable for Negative difficulty falling asleep;  Positive awakenings at night; Positive perceived regular dreaming; Positive nightmares; Negative  early morning headaches; Positive  memory problems; Positive  concentration issues; Negative caffeine;  Negative alcohol;   Negative history of any automobile or occupational accidents due to daytime drowsiness. Durham Sleepiness Score: 21   and Modified F.O.S.Q. Score Total / 2: 6.5    Visit Vitals  BP (!) 144/89   Pulse 71   Temp 97.5 °F (36.4 °C)   Ht 5' 5\" (1.651 m)   Wt 136 lb 11 oz (62 kg)   SpO2 97%   BMI 22.75 kg/m²    Neck circ. in \"inches\": 17      General:   Alert, oriented, not in acute distress   Eyes:  Anicteric Sclerae; intact EOM's   Nose:  No obvious nasal septum deviation    Oropharynx:   Mallampati score 4, thick tongue base, uvula not seen due to low-lying soft palate, narrow tonsilo-pharyngeal pilars, tongue scalloped   Neck:   midline trachea,  no JVD   Chest/Lungs:  symmetrical lung expansion ,clear lung fields on auscultation    CVS:  Normal rate, regular rhythm    Extremities:  No obvious rashes, absent edema    Neuro:  No focal deficits; No obvious tremor    Psych:  Normal eye contact; normal  affect, normal countenance          Vick Wheat MD, FAASM  Diplomate American Board of Sleep Medicine  Diplomate in Sleep Medicine - ABP    Electronically signed.  11/01/22

## 2022-11-01 NOTE — PATIENT INSTRUCTIONS
[address] [place]. [place], [address]  Tel.  [phone]  Fax. [address] [facility] [address]  [place], [address] [facility]  Tel.  [phone]  Fax. [phone] [address]  Tel.  [phone]  Fax. [phone]     Sleep Apnea: After Your Visit  Your Care Instructions  Sleep apnea occurs when you frequently stop breathing for 10 seconds or longer during sleep. It can be mild to severe, based on the number of times per hour that you stop breathing or have slowed breathing. Blocked or narrowed airways in your nose, mouth, or throat can cause sleep apnea. Your airway can become blocked when your throat muscles and tongue relax during sleep. Sleep apnea is common, occurring in 1 out of 20 individuals. Individuals having any of the following characteristics should be evaluated and treated right away due to high risk and detrimental consequences from untreated sleep apnea:  Obesity  Congestive Heart failure  Atrial Fibrillation  Uncontrolled Hypertension  Type II Diabetes  Night-time Arrhythmias  Stroke  Pulmonary Hypertension  High-risk Driving Populations (pilots, truck drivers, etc.)  Patients Considering Weight-loss Surgery    How do you know you have sleep apnea? You probably have sleep apnea if you answer 'yes' to 3 or more of the following questions:  S - Have you been told that you Snore? T - Are you often Tired during the day? O - Has anyone Observed you stop breathing while sleeping? P- Do you have (or are being treated for) high blood Pressure? B - Are you obese (Body Mass Index > 35)? A - Is your Age 48years old or older? N - Is your Neck size greater than 16 inches? G - Are you male Gender? A sleep physician can prescribe a breathing device that prevents tissues in the throat from blocking your airway. Or your doctor may recommend using a dental device (oral breathing device) to help keep your airway open.  In some cases, surgery may be needed to remove enlarged tissues in the throat. Follow-up care is a key part of your treatment and safety. Be sure to make and go to all appointments, and call your doctor if you are having problems. It's also a good idea to know your test results and keep a list of the medicines you take. How can you care for yourself at home? Lose weight, if needed. It may reduce the number of times you stop breathing or have slowed breathing. Go to bed at the same time every night. Sleep on your side. It may stop mild apnea. If you tend to roll onto your back, sew a pocket in the back of your paTripeese top. Put a tennis ball into the pocket, and stitch the pocket shut. This will help keep you from sleeping on your back. Avoid alcohol and medicines such as sleeping pills and sedatives before bed. Do not smoke. Smoking can make sleep apnea worse. If you need help quitting, talk to your doctor about stop-smoking programs and medicines. These can increase your chances of quitting for good. Prop up the head of your bed 4 to 6 inches by putting bricks under the legs of the bed. Treat breathing problems, such as a stuffy nose, caused by a cold or allergies. Use a continuous positive airway pressure (CPAP) breathing machine if lifestyle changes do not help your apnea and your doctor recommends it. The machine keeps your airway from closing when you sleep. If CPAP does not help you, ask your doctor whether you should try other breathing machines. A bilevel positive airway pressure machine has two types of air pressureâone for breathing in and one for breathing out. Another device raises or lowers air pressure as needed while you breathe. If your nose feels dry or bleeds when using one of these machines, talk with your doctor about increasing moisture in the air. A humidifier may help.   If your nose is runny or stuffy from using a breathing machine, talk with your doctor about using decongestants or a corticosteroid nasal spray.  When should you call for help? Watch closely for changes in your health, and be sure to contact your doctor if:  You still have sleep apnea even though you have made lifestyle changes. You are thinking of trying a device such as CPAP. You are having problems using a CPAP or similar machine. Where can you learn more? Go to [employer]. Enter I939 in the search box to learn more about \"Sleep Apnea: After Your Visit. \"   © 6105-2505 Healthwise, Incorporated. Care instructions adapted under license by Chitra Granados (which disclaims liability or warranty for this information). This care instruction is for use with your licensed healthcare professional. If you have questions about a medical condition or this instruction, always ask your healthcare professional. [employer] any warranty or liability for your use of this information. PROPER SLEEP HYGIENE    What to avoid  Do not have drinks with caffeine, such as coffee or black tea, for 8 hours before bed. Do not smoke or use other types of tobacco near bedtime. Nicotine is a stimulant and can keep you awake. Avoid drinking alcohol late in the evening, because it can cause you to wake in the middle of the night. Do not eat a big meal close to bedtime. If you are hungry, eat a light snack. Do not drink a lot of water close to bedtime, because the need to urinate may wake you up during the night. Do not read or watch TV in bed. Use the bed only for sleeping and sexual activity. What to try  Go to bed at the same time every night, and wake up at the same time every morning. Do not take naps during the day. Keep your bedroom quiet, dark, and cool. Get regular exercise, but not within 3 to 4 hours of your bedtime. .  Sleep on a comfortable pillow and mattress. If watching the clock makes you anxious, turn it facing away from you so you cannot see the time.   If you worry when you lie down, start a worry book. Well before bedtime, write down your worries, and then set the book and your concerns aside. Try meditation or other relaxation techniques before you go to bed. If you cannot fall asleep, get up and go to another room until you feel sleepy. Do something relaxing. Repeat your bedtime routine before you go to bed again. Make your house quiet and calm about an hour before bedtime. Turn down the lights, turn off the TV, log off the computer, and turn down the volume on music. This can help you relax after a busy day. Drowsy Driving  The [address] [place] [address] Road Traffic Safety Administration cites drowsiness as a causing factor in more than 833,763 police reported crashes annually, resulting in 76,000 injuries and 1,500 deaths. Other surveys suggest 55% of people polled have driven while drowsy in the past year, 23% had fallen asleep but not crashed, 3% crashed, and 2% had and accident due to drowsy driving. Who is at risk? Young Drivers: One study of drowsy driving accidents states that 55% of the drivers were under 25 years. Of those, 75% were male. Shift Workers and Travelers: People who work overnight or travel across time zones frequently are at higher risk of experiencing Circadian Rhythm Disorders. They are trying to work and function when their body is programed to sleep. Sleep Deprived: Lack of sleep has a serious impact on your ability to pay attention or focus on a task. Consistently getting less than the average of 8 hours your body needs creates partial or cumulative sleep deprivation. Untreated Sleep Disorders: Sleep Apnea, Narcolepsy, R.L.S., and other sleep disorders (untreated) prevent a person from getting enough restful sleep. This leads to excessive daytime sleepiness and increases the risk for drowsy driving accidents by up to 7 times. Medications / Alcohol: Even over the counter medications can cause drowsiness.  Medications that impair a drivers attention should have a warning label. Alcohol naturally makes you sleepy and on its own can cause accidents. Combined with excessive drowsiness its effects are amplified. Signs of Drowsy Driving:   * You don't remember driving the last few miles   * You may drift out of your low   * You are unable to focus and your thoughts wander   * You may yawn more often than normal   * You have difficulty keeping your eyes open / nodding off   * Missing traffic signs, speeding, or tailgating  Prevention-   Good sleep hygiene, lifestyle and behavioral choices have the most impact on drowsy driving. There is no substitute for sleep and the average person requires 8 hours nightly. If you find yourself driving drowsy, stop and sleep. Consider the sleep hygiene tips provided during your visit as well. Medication Refill Policy: Refills for all medications require 1 week advance notice. Please have your pharmacy fax a refill request. We are unable to fax, or call in \"controled substance\" medications and you will need to pick these prescriptions up from our office. [employer] Activation    Thank you for requesting access to [employer]. Please follow the instructions below to securely access and download your online medical record. [employer] allows you to send messages to your doctor, view your test results, renew your prescriptions, schedule appointments, and more. How Do I Sign Up? In your internet browser, go to [email or web address]. Click on the First Time User? Click Here link in the Sign In box. You will see the New Member Sign Up page. Enter your [employer] Access Code exactly as it appears below. You will not need to use this code after youve completed the sign-up process. If you do not sign up before the expiration date, you must request a new code. [employer] Access Code:  Activation code not generated  Current Mot[employer] Count Status: Active (This is the date your [employer] access code will )    Enter the last four digits of your Social Security Number (xxxx) and Date of Birth (mm/dd/yyyy) as indicated and click Submit. You will be taken to the next sign-up page. Create a Picturelife ID. This will be your Picturelife login ID and cannot be changed, so think of one that is secure and easy to remember. Create a Picturelife password. You can change your password at any time. Enter your Password Reset Question and Answer. This can be used at a later time if you forget your password. Enter your e-mail address. You will receive e-mail notification when new information is available in [address] Click Sign Up. You can now view and download portions of your medical record. Click the [facility] link to download a portable copy of your medical information. Additional Information    If you have questions, please call [phone]. Remember, Picturelife is NOT to be used for urgent needs. For medical emergencies, dial 911.

## 2022-11-28 ENCOUNTER — TRANSCRIBE ORDER (OUTPATIENT)
Dept: SCHEDULING | Age: 62
End: 2022-11-28

## 2022-11-28 DIAGNOSIS — I63.9 STROKE (HCC): Primary | ICD-10-CM

## 2022-11-28 DIAGNOSIS — R13.10 DYSPHAGIA: ICD-10-CM

## 2022-11-28 DIAGNOSIS — Z86.73 HISTORY OF STROKE: Primary | ICD-10-CM

## 2022-12-02 ENCOUNTER — TELEPHONE (OUTPATIENT)
Dept: SLEEP MEDICINE | Age: 62
End: 2022-12-02

## 2022-12-02 NOTE — TELEPHONE ENCOUNTER
[employer] denied PSG repeat study as not medically necessary as patient already has had a sleep study in the past.  [id number] review is not available for this case based on clinicals submitted. Forwarded above to physician to review and offer recommendations.

## 2022-12-05 NOTE — TELEPHONE ENCOUNTER
Attended testing ordered due to interim weight loss and recent history of stroke. Request to be re-submitted. Encounter Diagnoses   Name Primary?     BRISSA (obstructive sleep apnea) Yes    H/O: stroke      Orders Placed This Encounter    POLYSOMNOGRAPHY 1 NIGHT     Standing Status:   Future     Standing Expiration Date:   6/5/2023     Order Specific Question:   Reason for Exam     Answer:   BRISSA

## 2022-12-06 NOTE — TELEPHONE ENCOUNTER
Appeal for reconsideration faxed to [facility] at [phone] with sleep records and Dr. Jenni Jeong medical necessity reason for repeat study.

## 2022-12-18 ENCOUNTER — HOSPITAL ENCOUNTER (OUTPATIENT)
Dept: SLEEP MEDICINE | Age: 62
Discharge: HOME | End: 2022-12-18
Payer: MEDICARE

## 2022-12-18 VITALS
TEMPERATURE: 97.6 F | HEART RATE: 66 BPM | BODY MASS INDEX: 21.83 KG/M2 | SYSTOLIC BLOOD PRESSURE: 150 MMHG | HEIGHT: 65 IN | DIASTOLIC BLOOD PRESSURE: 72 MMHG | OXYGEN SATURATION: 99 % | WEIGHT: 131 LBS

## 2022-12-18 DIAGNOSIS — G47.33 OSA (OBSTRUCTIVE SLEEP APNEA): ICD-10-CM

## 2022-12-18 PROCEDURE — 95810 POLYSOM 6/> YRS 4/> PARAM: CPT | Performed by: INTERNAL MEDICINE

## 2022-12-19 ENCOUNTER — TELEPHONE (OUTPATIENT)
Dept: SLEEP MEDICINE | Age: 62
End: 2022-12-19

## 2022-12-19 ENCOUNTER — DOCUMENTATION ONLY (OUTPATIENT)
Dept: SLEEP MEDICINE | Age: 62
End: 2022-12-19

## 2022-12-19 DIAGNOSIS — G47.33 OSA (OBSTRUCTIVE SLEEP APNEA): Primary | ICD-10-CM

## 2022-12-19 NOTE — LETTER
1/6/2023 1:31 PM    Mr. Adrian Ayala [address] [id number]        Your sleep supplies order has been sent to [facility]. Once you receive your supplies please call us at [facility] [phone] to schedule a first adherence .       Sincerely,      Eliana Newell MD

## 2022-12-19 NOTE — PROGRESS NOTES
[address] [facility] [place]. [place], [address]  Tel.  [phone]  Fax. [phone] [facility]  [place], [address] [facility]  Tel.  [phone]  Fax. [phone] [address]  Tel.  [phone]  Fax. [phone]     Sleep Study Technical Notes        PRE-Test:  Nikolas Wang (: 1960) arrived in the lobby via wheelchair and was accompanied by his wife. Patient was greeted and screening questions asked. The patient was taken to the Sleep Center and taken directly to his/her room. Vitals:  Temperature (97.6)   BP (150/72)   SaO2 (99)   Weight per patient (131) - per wife  Procedure explained to the patient and questions were answered. The patient expressed understanding of the procedure. Electrodes were applied without incident. The patient was placed in bed by his wife and the study was started. Sleep aid taken: No      Acquisition Notes:  Lights off:   9:44 pm  Respiratory events: Hypopneas, Obstructive, and Central apneas  ECG:  NSR  Snoring: Moderate  PAP titration: n/a  Eliminated events:  Reduced events:  Events at  final pressure n/a  Desensitization Mask(s) Used: n/a  Positional therapy with: Other comments: Pt is a 1:1 due to mobility issues and H/O multiple strokes. .Pt has weakness on left side. Wife was able to help interpret pt. Mr. Drew Mireles is able to comprehend just fine. Pt removed electrodes towards the end of the night several times. Pt would possibly benefit from a full head wrap using gauze, as well as being placed in Room 8  in the event of another in lab study. Patient had caregiver in attendance:  Yes  Patient watched TV or on phone after lights out for 0 hours  Patient slept with positional therapy: No.     Patient slept with head of bed elevated:  No  Patient wore an oral appliance:  No  Patient to bathroom 0 times. A urinal was given in the event pt had to use the rr.  Pts wife stated that he usually stays in bed throughout the night. POST Test:  Patient was awakened. Electrodes were removed. The patient was discharged after answering the Post Questionnaire. Patient's wife stated that she was alert and ok to drive. Equipment and room cleaned per infection control policy.

## 2022-12-20 ENCOUNTER — HOSPITAL ENCOUNTER (OUTPATIENT)
Dept: GENERAL RADIOLOGY | Age: 62
Discharge: HOME | End: 2022-12-20
Attending: FAMILY MEDICINE
Payer: MEDICARE

## 2022-12-20 DIAGNOSIS — R13.10 DYSPHAGIA: ICD-10-CM

## 2022-12-20 DIAGNOSIS — I63.9 STROKE (HCC): ICD-10-CM

## 2022-12-20 PROCEDURE — 92611 MOTION FLUOROSCOPY/SWALLOW: CPT

## 2022-12-20 PROCEDURE — 74230 X-RAY XM SWLNG FUNCJ C+: CPT

## 2022-12-20 NOTE — PROGRESS NOTES
[address], [address] [place] [address] STUDY  Patient: Armen Matta (63 y.o. male)  Date: 12/20/2022  Referring Provider:  Dr. Dolores Viveros:   Patient presents with his wife for swallow study. Patient with hx of stroke in August of this year where he was treated at [facility]. Has residual L sided weakness and limited expressive communication. History primarily obtained from chart and wife. She reports that he then was in rehab before being sent home with home health. Per patient's wife, patient had dysphagia following the stroke and had multiple swallow studies. He was sent home from rehab on pureed diet and thickened liquids. His wife reports after 2 weeks home he refused this diet. She also reports significant weight loss since being on the pureed diet with thickened liquids. She reports that since September he has been eating regular foods and drinks at home including sticky rice and thin coffee and tea. She and HH SLP notice coughing with and without PO (on his secretions/mucous). Wife denies any respiratory complications or illnesses since patient reverted to regular diet/thin liquids. Swallow study was ordered to assess oropharyngeal swallow function and inform safest and least restrictive diet recommendations.      OBJECTIVE:   Past Medical History:   Past Medical History:   Diagnosis Date    Chronic kidney disease     dialysis/ [address] [facility] m-w-f    Fatigue     Gout     Hypercholesterolemia     Hypertension     Sleep apnea     cpap    Snoring     Stroke ([facility] [address]     TIA, stroke 2007, 2012     Past Surgical History:   Procedure Laterality Date    HX HEENT      tonsillectomy in 2002    HX HEENT      nasal polypectomy    HX NEPHRECTOMY  1980    kidney removed    HX VASCULAR ACCESS Left     neck for dialysis    VASCULAR SURGERY PROCEDURE UNLIST  01/15/2019    Creation of AV Fistula Left Arm VASCULAR SURGERY PROCEDURE UNLIST  04/23/2019    TAVF left arm     Current Dietary Status:  regular diet/thin liquids  Radiologist: Dr. Emory Key: Lateral;Fluoro  Patient Position: Sitting upright in housted chair    Trial 1-2: Trial 3:   Consistency Presented: Thin liquid Consistency Presented: Puree   How Presented: Self-fed/presented How Presented: Self-fed/presented;Spoon   Consistency Amount:  (60 cc) Consistency Amount:  (2 tbs)   Bolus Acceptance: No impairment Bolus Acceptance: No impairment   Bolus Formation/Control: No impairment:   Bolus Formation/Control: No impairment:     Propulsion: Discoordination; Other (comment) (rapid) Propulsion: Delayed (# of seconds)   Oral Residue: Anterior Oral Residue: None   Initiation of Swallow: Triggered at pyriform sinus(es) Initiation of Swallow: Triggered at valleculae   Timing: Pooling 1-5 sec Timing: No impairment   Penetration: Before swallow; To cords Penetration: None   Aspiration/Timing: After;From residual Aspiration/Timing: No evidence of aspiration   Pharyngeal Clearance: Pyriform residue ;10-50% Pharyngeal Clearance: No residue   Attempted Modifications: Small sips and bites     Effective Modifications: Small sips and bites     Cues for Modifications: Moderate-maximal             Trial 4:   Consistency Presented: Solid   How Presented: Self-fed/presented   Consistency Amount:  (1 bite graha, cracker with ba paste)   Bolus Acceptance: No impairment   Bolus Formation/Control: Impaired: Delayed;Mastication   Propulsion: Discoordination   Oral Residue: Lingual   Initiation of Swallow: Triggered at valleculae   Timing: No impairment   Penetration: None   Aspiration/Timing: No evidence of aspiration   Pharyngeal Clearance: Vallecular residue   Attempted Modifications: Alternate liquids/solids   Effective Modifications:  Alternate liquids/solids   Cues for Modifications: Minimal-Mod         Decreased Tongue Base Retraction?: No  Laryngeal Elevation: [facility] (within functional limits)  Aspiration/Penetration Score: 7 (Aspiration-Contrast passes below the cords/, but is not ejected despite attempt)  Pharyngeal Symmetry: Not assessed  Pharyngeal-Esophageal Segment: No impairment  Pharyngeal Dysfunction: Decreased strength;Decreased pharyngeal wall constriction    Oral Phase Severity: Mild  Pharyngeal Phase Severity: Moderate    ASSESSMENT :  Based on the objective data described above, the patient presents with mild oral and moderate pharyngeal dysphagias. Oral phase characterized by reduced bolus formation and control secondary to reduced lingual strength and coordiantion. This resulted in rapid posterior propulsion with thin liquids and prolonged propulsion with purees and solids. Patient with effortful yet efficient mastication with solid. Mild coating of lingual residue after solid trial that cleared with a liquid wash. Pharyngeal phase characterized by reduced sensation and pharyngeal constriction. This resulted in swallow delay to the piriforms with thin liquids with penetration to the cords before the swallow. Reduced constriction resulted in pharyngeal residue in piriforms and vallecula with large boluses of thin liquids. Residue was then penetrated to the cords and eventually trace aspirated after the swallow. Patient sensate to aspiration eliciting cough that cleared majority of aspirate. When patient cued to take small sips patient continued with delay to piriforms with penetration to the cords, however penetration cleared with the force of the swallow for majority of the swallows. Patient sensate to any remaining penetration and elicited a throat clear that was effective in clearing penetration. With smaller bolus patient with reduced pharyngeal residue resulting in no penetration or aspiration after the swallow. No penetration , aspiration or residue appreciated with puree.  With solid patient with mild collection of residue in vallecula that cleared with liquid wash. UES WFL. Thickened liquids not trialed given patient's non-compliance with them at home. Given results of study recommend continue with regular diet/thin liquids with strict precautions as outlined below. Patient impulsive with PO trials and required straw pinch method to ensure small sips during study. Suspect that as patient regains strength and sensation he will continue to show improvements in swallow function. Recommend repeating study with any concern for pulmonary compromise (PNA, increase in WBC count etc.). PLAN/RECOMMENDATIONS :  -- regular diet/thin liquids   -- strict cuing for small sips and bites - especially with liquids as large bolus resulted in aspiration  -- alternate bites and sips to clear residue   -- Continue with HH SLP for swallow therapy, strategy reinforcement and continued monitoring of diet tolerance     COMMUNICATION/EDUCATION:   The above findings and recommendations were discussed with patient and his wife who verbalized understanding and will be faxed to PCP and treating SLP.     Thank you for this referral.  Leticia Azul M.S. S[facility] Will Pathologist     Time Calculation: 25 mins

## 2022-12-29 NOTE — TELEPHONE ENCOUNTER
Braxton Durán underwent Sleep Testing which was indicative of an average AHI of 27.9 per hour with an SpO2 ren of 69% and SpO2 of < 88% being 14.20 minutes. An APAP prescription has been written and patient will be contacted by office staff regarding follow-up  in 2-3 months after initiation of therapy. Encounter Diagnosis   Name Primary? BRISSA (obstructive sleep apnea) Yes       Orders Placed This Encounter    AMB SUPPLY ORDER     Diagnosis: Obstructive Sleep Apnea ICD-10 Code (G47.33)    Positive Airway Pressure Therapy: Duration of need: 99 months. APAP Device with Heated Humidifer [id number] / [id number]. Minimum Pressure: 04 cmH2O, Maximum Pressure: 12 cmH2O.     Nasal Pillows Combo Mask (Replace) 2 per month.  Nasal Pillows (Replace) 2 per month.  Full Face Mask 1 every 3 months.  Full Face Mask Cushion 1 per month.  Nasal Cushion (Replace) 2 per month.  Nasal Interface Mask 1 every 3 months.  Headgear 1 every 6 months.  Chinstrap 1 every 6 months.  Tubing 1 every 3 months.  Filter(s) Disposable 2 per month.  Filter(s) Non-Disposable 1 every 6 months. .   [address] for Humidifier (Replace) 1 every 6 months. Perform Mask Fitting per patient preference and comfort - replace as above. Jd Castle MD, [facility]; NPI: [id number]  Electronically signed. 12/29/22

## 2022-12-29 NOTE — TELEPHONE ENCOUNTER
Results have been sent to  [facility]. Fax DME order & Schedule 1st adherence visit in 60 to 90 days.

## 2022-12-30 ENCOUNTER — DOCUMENTATION ONLY (OUTPATIENT)
Dept: SLEEP MEDICINE | Age: 62
End: 2022-12-30

## 2023-01-06 NOTE — TELEPHONE ENCOUNTER
Letter was sent for Your sleep supplies order has been sent to [facility]. Once you receive your supplies please call us at [facility] [phone] to schedule a first adherence .

## 2023-01-18 RX ORDER — METOPROLOL TARTRATE 25 MG/1
TABLET, FILM COATED ORAL
Qty: 90 TABLET | Refills: 1 | Status: SHIPPED | OUTPATIENT
Start: 2023-01-18

## 2023-01-18 RX ORDER — MIRTAZAPINE 15 MG/1
TABLET, FILM COATED ORAL
Qty: 45 TABLET | Refills: 1 | Status: SHIPPED | OUTPATIENT
Start: 2023-01-18

## 2023-01-18 RX ORDER — ROSUVASTATIN CALCIUM 10 MG/1
10 TABLET, COATED ORAL
Qty: 90 TABLET | Refills: 1 | Status: SHIPPED | OUTPATIENT
Start: 2023-01-18

## 2023-01-25 RX ORDER — ROSUVASTATIN CALCIUM 10 MG/1
10 TABLET, COATED ORAL
Qty: 30 TABLET | Refills: 0 | Status: SHIPPED | OUTPATIENT
Start: 2023-01-25

## 2023-01-25 RX ORDER — MIRTAZAPINE 15 MG/1
TABLET, FILM COATED ORAL
Qty: 30 TABLET | Refills: 0 | Status: SHIPPED | OUTPATIENT
Start: 2023-01-25

## 2023-03-16 RX ORDER — OMEPRAZOLE 20 MG/1
20 CAPSULE, DELAYED RELEASE ORAL DAILY
Qty: 90 CAPSULE | Refills: 0 | Status: SHIPPED | OUTPATIENT
Start: 2023-03-16

## 2023-03-16 RX ORDER — PHENYTOIN SODIUM 100 MG/1
CAPSULE, EXTENDED RELEASE ORAL
Qty: 270 CAPSULE | Refills: 0 | Status: SHIPPED | OUTPATIENT
Start: 2023-03-16

## 2023-03-16 RX ORDER — HYDRALAZINE HYDROCHLORIDE 25 MG/1
TABLET, FILM COATED ORAL
Qty: 270 TABLET | Refills: 0 | Status: SHIPPED | OUTPATIENT
Start: 2023-03-16

## 2023-04-13 PROBLEM — E78.5 DYSLIPIDEMIA: Status: ACTIVE | Noted: 2017-11-17

## 2023-04-13 PROBLEM — I69.359 CVA, OLD, HEMIPARESIS (HCC): Status: ACTIVE | Noted: 2023-04-13

## 2023-04-13 PROBLEM — R56.9 SEIZURE (HCC): Status: ACTIVE | Noted: 2023-04-13

## 2023-04-16 PROBLEM — G93.1 ANOXIC BRAIN DAMAGE (HCC): Status: ACTIVE | Noted: 2023-04-16

## 2023-04-16 PROBLEM — G47.33 OBSTRUCTIVE SLEEP APNEA: Status: ACTIVE | Noted: 2017-11-17

## 2023-04-25 ENCOUNTER — TELEPHONE (OUTPATIENT)
Dept: INTERNAL MEDICINE CLINIC | Age: 63
End: 2023-04-25

## 2023-04-25 NOTE — TELEPHONE ENCOUNTER
----- Message from Joby Hahn MD sent at 4/24/2023 10:58 PM EDT -----  Tell wife patient needs to take 300 mg of Dilantin and not 200  Rosuvastatin needs to be increased to 20 mg daily and will probably need further increase eventually.

## 2023-05-04 ENCOUNTER — OFFICE VISIT (OUTPATIENT)
Dept: NEUROLOGY | Age: 63
End: 2023-05-04

## 2023-05-04 VITALS
DIASTOLIC BLOOD PRESSURE: 68 MMHG | BODY MASS INDEX: 22.49 KG/M2 | SYSTOLIC BLOOD PRESSURE: 136 MMHG | OXYGEN SATURATION: 99 % | HEART RATE: 72 BPM | WEIGHT: 135 LBS | HEIGHT: 65 IN

## 2023-05-04 DIAGNOSIS — R56.9 SEIZURE-LIKE ACTIVITY (HCC): ICD-10-CM

## 2023-05-04 DIAGNOSIS — E78.5 HYPERLIPIDEMIA, UNSPECIFIED HYPERLIPIDEMIA TYPE: ICD-10-CM

## 2023-05-04 DIAGNOSIS — I10 HYPERTENSION, UNSPECIFIED TYPE: ICD-10-CM

## 2023-05-04 DIAGNOSIS — Z86.73 REMOTE HISTORY OF STROKE: Primary | ICD-10-CM

## 2023-05-04 DIAGNOSIS — M21.371 FOOT DROP, RIGHT: ICD-10-CM

## 2023-05-04 RX ORDER — PHENYTOIN SODIUM 100 MG/1
100 CAPSULE, EXTENDED RELEASE ORAL 3 TIMES DAILY
Qty: 270 CAPSULE | Refills: 1 | Status: SHIPPED | OUTPATIENT
Start: 2023-05-04

## 2023-05-05 ENCOUNTER — DOCUMENTATION ONLY (OUTPATIENT)
Dept: NEUROLOGY | Age: 63
End: 2023-05-05

## 2023-05-05 LAB
ALBUMIN SERPL-MCNC: 4.5 G/DL (ref 3.8–4.8)
ALBUMIN/GLOB SERPL: 1.6 {RATIO} (ref 1.2–2.2)
ALP SERPL-CCNC: 68 IU/L (ref 44–121)
ALT SERPL-CCNC: 5 IU/L (ref 0–44)
AST SERPL-CCNC: 9 IU/L (ref 0–40)
BASOPHILS # BLD AUTO: 0.1 X10E3/UL (ref 0–0.2)
BASOPHILS NFR BLD AUTO: 2 %
BILIRUB SERPL-MCNC: 0.3 MG/DL (ref 0–1.2)
BUN SERPL-MCNC: 30 MG/DL (ref 8–27)
BUN/CREAT SERPL: 4 (ref 10–24)
CALCIUM SERPL-MCNC: 10.1 MG/DL (ref 8.6–10.2)
CHLORIDE SERPL-SCNC: 94 MMOL/L (ref 96–106)
CO2 SERPL-SCNC: 26 MMOL/L (ref 20–29)
CREAT SERPL-MCNC: 7.64 MG/DL (ref 0.76–1.27)
EGFRCR SERPLBLD CKD-EPI 2021: 7 ML/MIN/1.73
EOSINOPHIL # BLD AUTO: 1.1 X10E3/UL (ref 0–0.4)
EOSINOPHIL NFR BLD AUTO: 22 %
ERYTHROCYTE [DISTWIDTH] IN BLOOD BY AUTOMATED COUNT: 13.1 % (ref 11.6–15.4)
GLOBULIN SER CALC-MCNC: 2.8 G/DL (ref 1.5–4.5)
GLUCOSE SERPL-MCNC: 75 MG/DL (ref 70–99)
HCT VFR BLD AUTO: 32.1 % (ref 37.5–51)
HGB BLD-MCNC: 10.5 G/DL (ref 13–17.7)
IMM GRANULOCYTES # BLD AUTO: 0 X10E3/UL (ref 0–0.1)
IMM GRANULOCYTES NFR BLD AUTO: 0 %
LYMPHOCYTES # BLD AUTO: 0.9 X10E3/UL (ref 0.7–3.1)
LYMPHOCYTES NFR BLD AUTO: 18 %
MCH RBC QN AUTO: 27.8 PG (ref 26.6–33)
MCHC RBC AUTO-ENTMCNC: 32.7 G/DL (ref 31.5–35.7)
MCV RBC AUTO: 85 FL (ref 79–97)
MONOCYTES # BLD AUTO: 0.4 X10E3/UL (ref 0.1–0.9)
MONOCYTES NFR BLD AUTO: 9 %
MORPHOLOGY BLD-IMP: ABNORMAL
NEUTROPHILS # BLD AUTO: 2.3 X10E3/UL (ref 1.4–7)
NEUTROPHILS NFR BLD AUTO: 49 %
PHENYTOIN SERPL-MCNC: 4.5 UG/ML (ref 10–20)
PLATELET # BLD AUTO: 304 X10E3/UL (ref 150–450)
POTASSIUM SERPL-SCNC: 4.3 MMOL/L (ref 3.5–5.2)
PROT SERPL-MCNC: 7.3 G/DL (ref 6–8.5)
RBC # BLD AUTO: 3.78 X10E6/UL (ref 4.14–5.8)
SODIUM SERPL-SCNC: 140 MMOL/L (ref 134–144)
WBC # BLD AUTO: 4.8 X10E3/UL (ref 3.4–10.8)

## 2023-05-09 ENCOUNTER — TRANSCRIBE ORDERS (OUTPATIENT)
Facility: HOSPITAL | Age: 63
End: 2023-05-09

## 2023-05-09 DIAGNOSIS — R56.9 SEIZURE-LIKE ACTIVITY (HCC): Primary | ICD-10-CM

## 2023-05-18 ENCOUNTER — TELEPHONE (OUTPATIENT)
Age: 63
End: 2023-05-18

## 2023-05-18 ENCOUNTER — HOSPITAL ENCOUNTER (OUTPATIENT)
Facility: HOSPITAL | Age: 63
Discharge: HOME | End: 2023-05-18
Payer: MEDICARE

## 2023-05-18 DIAGNOSIS — R56.9 SEIZURE-LIKE ACTIVITY (HCC): ICD-10-CM

## 2023-05-18 DIAGNOSIS — M21.371 FOOT DROP, RIGHT FOOT: Primary | ICD-10-CM

## 2023-05-18 PROCEDURE — 95816 EEG AWAKE AND DROWSY: CPT

## 2023-05-18 RX ORDER — OMEPRAZOLE 20 MG/1
20 CAPSULE, DELAYED RELEASE ORAL DAILY
COMMUNITY
Start: 2023-03-16 | End: 2023-05-30

## 2023-05-18 RX ORDER — ASPIRIN 81 MG/1
81 TABLET, CHEWABLE ORAL DAILY
COMMUNITY
Start: 2022-10-24

## 2023-05-18 RX ORDER — CALCIUM ACETATE 667 MG/1
TABLET ORAL
COMMUNITY
Start: 2022-10-24

## 2023-05-18 RX ORDER — HYDRALAZINE HYDROCHLORIDE 50 MG/1
50 TABLET, FILM COATED ORAL 2 TIMES DAILY
COMMUNITY
Start: 2023-04-13

## 2023-05-18 RX ORDER — MIRTAZAPINE 15 MG/1
TABLET, FILM COATED ORAL
COMMUNITY
Start: 2023-01-25 | End: 2023-06-08

## 2023-05-18 RX ORDER — FERROUS SULFATE 325(65) MG
325 TABLET ORAL
COMMUNITY
Start: 2019-10-27

## 2023-05-18 RX ORDER — PHENYTOIN SODIUM 100 MG/1
100 CAPSULE, EXTENDED RELEASE ORAL 3 TIMES DAILY
COMMUNITY
Start: 2023-05-04

## 2023-05-18 RX ORDER — ROSUVASTATIN CALCIUM 10 MG/1
1 TABLET, COATED ORAL NIGHTLY
COMMUNITY
Start: 2023-01-25 | End: 2023-06-08

## 2023-05-18 NOTE — TELEPHONE ENCOUNTER
Patient's wife stopped by the office today. States the fax number she has given to us was incorrect. Would like it faxed to [phone]. (Regarding R carbon fiber AFO for foot drop).  Script was made on 05/04/23

## 2023-05-19 NOTE — TELEPHONE ENCOUNTER
Called patient. No answer. Left a VM stating the doctor signed a printed script for his AFO brace. The fax number of [phone] was faxed before and they do not carry it. Received another number of [phone]. I will fax it there but also states the patient can  the paper script if he would like.

## 2023-05-22 PROBLEM — R56.9 SEIZURE-LIKE ACTIVITY (HCC): Status: ACTIVE | Noted: 2023-04-13

## 2023-05-22 PROCEDURE — 95816 EEG AWAKE AND DROWSY: CPT | Performed by: PSYCHIATRY & NEUROLOGY

## 2023-05-22 NOTE — PROCEDURES
[address] [facility]  EEG    Name:  Silvia Salvador  MR#:  [id number]  :  1960  ACCOUNT #:  [de-identified]  DATE OF SERVICE:  2023      REQUESTING PHYSICIAN:  Bill SanonrDO    HISTORY:  The patient is a 69-year-old male who is being evaluated for seizure-like activity. He has history of multiple strokes/TIAs. DESCRIPTION:  This is an 18-channel EEG performed on an awake and drowsy patient. During wakefulness, the dominant posterior background rhythm consists of medium voltage rhythms in the 8 Hz frequency range. Faster frequencies along with muscle tension artifact are seen in the frontal areas. Drowsiness is characterized by slowing in the central region. Photic stimulation elicits a symmetric driving response. Hyperventilation was not performed. EEG SUMMARY:  Normal study. CLINICAL INTERPRETATION:  This was a normal EEG during awake and drowsy states of the patient. No lateralizing or epileptiform features were noted.       Marshal Calzada MD      [id number]  D:  2023 14:39  T:  2023 15:37  JOB #:  [id number]

## 2023-05-26 ENCOUNTER — TELEPHONE (OUTPATIENT)
Age: 63
End: 2023-05-26

## 2023-05-26 NOTE — TELEPHONE ENCOUNTER
Called and spoke to the Pt's wife two identifiers verified.  Per Dr. Yakelin Pacheco:    EEG is normal.

## 2023-05-30 RX ORDER — OMEPRAZOLE 20 MG/1
CAPSULE, DELAYED RELEASE ORAL
Qty: 90 CAPSULE | Refills: 3 | Status: SHIPPED | OUTPATIENT
Start: 2023-05-30

## 2023-06-08 RX ORDER — MIRTAZAPINE 15 MG/1
TABLET, FILM COATED ORAL
Qty: 45 TABLET | Refills: 3 | Status: SHIPPED | OUTPATIENT
Start: 2023-06-08

## 2023-06-08 RX ORDER — ROSUVASTATIN CALCIUM 10 MG/1
TABLET, COATED ORAL
Qty: 90 TABLET | Refills: 3 | Status: SHIPPED | OUTPATIENT
Start: 2023-06-08

## 2023-07-06 NOTE — MR AVS SNAPSHOT
[address] [address] [address] [address] 
[phone] Patient: Blanca Schmidt MRN: [id number] Visit Information Date & Time Provider Department Dept. Phone Encounter #  
 4/26/2018 11:00 AM Adonis Garcia MD [address] [address] [id number] Follow-up Instructions Return if symptoms worsen or fail to improve. Follow-up and Disposition History Upcoming Health Maintenance Date Due Hepatitis C Screening 1960 DTaP/Tdap/Td series (1 - Tdap) 8/9/1980 FOBT Q 1 YEAR AGE 50-75 8/9/2009 Allergies as of 4/26/2018  Review Complete On: 4/26/2018 By: Adonis Garcia MD  
 No Known Allergies Current Immunizations  Reviewed on 11/17/2017 Name Date Influenza Vaccine (Quad) PF 11/17/2017 Not reviewed this visit You Were Diagnosed With   
  
 Codes Comments BRISSA (obstructive sleep apnea)    -  Primary ICD-10-CM: G47.33 
ICD-9-CM: 327.23 Essential hypertension     ICD-10-CM: I10 
ICD-9-CM: 401.9 BMI 29.0-29.9,adult     ICD-10-CM: O63.00 ICD-9-CM: V85.25 History of stroke     ICD-10-CM: Z86.73 
ICD-9-CM: V12.54 Vitals BP Pulse Height(growth percentile) Weight(growth percentile) SpO2 BMI  
 (!) 158/95 80 5' 5\" (1.651 m) 179 lb (81.2 kg) 98% 29.79 kg/m2 Smoking Status Former Smoker Vitals History BMI and BSA Data Body Mass Index Body Surface Area  
 29.79 kg/m 2 1.93 m 2 Preferred Pharmacy Pharmacy Name Phone [facility] [address], [address] [facility] [phone] Your Updated Medication List  
  
   
This list is accurate as of 4/26/18 11:44 AM.  Always use your most recent med list.  
  
  
  
  
 aspirin 81 mg chewable tablet Take 81 mg by mouth daily. atorvastatin 80 mg tablet Commonly known as:  LIPITOR Take 80 mg by mouth daily. cholecalciferol (VITAMIN D3) 5,000 unit Tab tablet Commonly known as:  VITAMIN D3 Take  by mouth daily. cloNIDine HCl 0.3 mg tablet Commonly known as:  CATAPRES Take 0.3 mg by mouth two (2) times a day. hydrALAZINE 25 mg tablet Commonly known as:  APRESOLINE Take 25 mg by mouth three (3) times daily. predniSONE 10 mg dose pack Commonly known as:  STERAPRED DS Take 1 Tab by mouth See Admin Instructions. Indications: Contact Dermatitis TOPROL  mg tablet Generic drug:  metoprolol succinate Take  by mouth daily. vitamin e 200 unit capsule Commonly known as:  [address] Take  by mouth daily. We Performed the Following SLEEP STUDY UNATTENDED, 4 CHANNEL [id number] CPT(R)] Follow-up Instructions Return if symptoms worsen or fail to improve. Patient Instructions [address] [place]. 1668 [facility], [address] Tel.  [phone] Fax. 100 [facility] [address] [place], 200 [facility] Tel.  [phone] Fax. [phone] [address] [address] Tel.  [phone] Fax. [phone] Sleep Apnea: After Your Visit Your Care Instructions Sleep apnea occurs when you frequently stop breathing for 10 seconds or longer during sleep. It can be mild to severe, based on the number of times per hour that you stop breathing or have slowed breathing. Blocked or narrowed airways in your nose, mouth, or throat can cause sleep apnea. Your airway can become blocked when your throat muscles and tongue relax during sleep. Sleep apnea is common, occurring in 1 out of 20 individuals. Individuals having any of the following characteristics should be evaluated and treated right away due to high risk and detrimental consequences from untreated sleep apnea: 
1. Obesity 2. Congestive Heart failure 3. Atrial Fibrillation 4. Uncontrolled Hypertension 5. Type II Diabetes 6. Night-time Arrhythmias 7. Stroke 8. Pulmonary Hypertension 9. High-risk Driving Populations (pilots, truck drivers, etc.) 10. Patients Considering Weight-loss Surgery How do you know you have sleep apnea? You probably have sleep apnea if you answer 'yes' to 3 or more of the following questions: S - Have you been told that you Snore? T - Are you often Tired during the day? O - Has anyone Observed you stop breathing while sleeping? P- Do you have (or are being treated for) high blood Pressure? B - Are you obese (Body Mass Index > 35)? A - Is your Age 48years old or older? N - Is your Neck size greater than 16 inches? G - Are you male Gender? A sleep physician can prescribe a breathing device that prevents tissues in the throat from blocking your airway. Or your doctor may recommend using a dental device (oral breathing device) to help keep your airway open. In some cases, surgery may be needed to remove enlarged tissues in the throat. Follow-up care is a key part of your treatment and safety. Be sure to make and go to all appointments, and call your doctor if you are having problems. It's also a good idea to know your test results and keep a list of the medicines you take. How can you care for yourself at home? · Lose weight, if needed. It may reduce the number of times you stop breathing or have slowed breathing. · Go to bed at the same time every night. · Sleep on your side. It may stop mild apnea. If you tend to roll onto your back, sew a pocket in the back of your pajama top. Put a tennis ball into the pocket, and stitch the pocket shut. This will help keep you from sleeping on your back. · Avoid alcohol and medicines such as sleeping pills and sedatives before bed. · Do not smoke. Smoking can make sleep apnea worse. If you need help quitting, talk to your doctor about stop-smoking programs and medicines. These can increase your chances of quitting for good. · Prop up the head of your bed 4 to 6 inches by putting bricks under the legs of the bed. · Treat breathing problems, such as a stuffy nose, caused by a cold or allergies. · Use a continuous positive airway pressure (CPAP) breathing machine if lifestyle changes do not help your apnea and your doctor recommends it. The machine keeps your airway from closing when you sleep. · If CPAP does not help you, ask your doctor whether you should try other breathing machines. A bilevel positive airway pressure machine has two types of air pressureâone for breathing in and one for breathing out. Another device raises or lowers air pressure as needed while you breathe. · If your nose feels dry or bleeds when using one of these machines, talk with your doctor about increasing moisture in the air. A humidifier may help. · If your nose is runny or stuffy from using a breathing machine, talk with your doctor about using decongestants or a corticosteroid nasal spray. When should you call for help? Watch closely for changes in your health, and be sure to contact your doctor if: 
· You still have sleep apnea even though you have made lifestyle changes. · You are thinking of trying a device such as CPAP. · You are having problems using a CPAP or similar machine. Where can you learn more? Go to CardioGenics.be. Enter N834 in the search box to learn more about \"Sleep Apnea: After Your Visit. \"  
© 0847-5237 Healthwise, Incorporated. Care instructions adapted under license by [address] [employer] (which disclaims liability or warranty for this information). This care instruction is for use with your licensed healthcare professional. If you have questions about a medical condition or this instruction, always ask your healthcare professional. Ashby Mas any warranty or liability for your use of this information. PROPER SLEEP HYGIENE What to avoid · Do not have drinks with caffeine, such as coffee or black tea, for 8 hours before bed. · Do not smoke or use other types of tobacco near bedtime. Nicotine is a stimulant and can keep you awake. · Avoid drinking alcohol late in the evening, because it can cause you to wake in the middle of the night. · Do not eat a big meal close to bedtime. If you are hungry, eat a light snack. · Do not drink a lot of water close to bedtime, because the need to urinate may wake you up during the night. · Do not read or watch TV in bed. Use the bed only for sleeping and sexual activity. What to try · Go to bed at the same time every night, and wake up at the same time every morning. Do not take naps during the day. · Keep your bedroom quiet, dark, and cool. · Get regular exercise, but not within 3 to 4 hours of your bedtime. Frutoso Golder · Sleep on a comfortable pillow and mattress. · If watching the clock makes you anxious, turn it facing away from you so you cannot see the time. · If you worry when you lie down, start a worry book. Well before bedtime, write down your worries, and then set the book and your concerns aside. · Try meditation or other relaxation techniques before you go to bed. · If you cannot fall asleep, get up and go to another room until you feel sleepy. Do something relaxing. Repeat your bedtime routine before you go to bed again. · Make your house quiet and calm about an hour before bedtime. Turn down the lights, turn off the TV, log off the computer, and turn down the volume on music. This can help you relax after a busy day. Drowsy Driving The "[employer] cites drowsiness as a causing factor in more than 371,933 police reported crashes annually, resulting in 76,000 injuries and 1,500 deaths.  Other surveys suggest 55% of people polled have driven while drowsy in the past year, 23% had fallen asleep but not crashed, 3% crashed, and 2% had and accident due to drowsy driving. Who is at risk? Young Drivers: One study of drowsy driving accidents states that 55% of the drivers were under 25 years. Of those, 75% were male. Shift Workers and Travelers: People who work overnight or travel across time zones frequently are at higher risk of experiencing Circadian Rhythm Disorders. They are trying to work and function when their body is programed to sleep. Sleep Deprived: Lack of sleep has a serious impact on your ability to pay attention or focus on a task. Consistently getting less than the average of 8 hours your body needs creates partial or cumulative sleep deprivation. Untreated Sleep Disorders: Sleep Apnea, Narcolepsy, R.L.S., and other sleep disorders (untreated) prevent a person from getting enough restful sleep. This leads to excessive daytime sleepiness and increases the risk for drowsy driving accidents by up to 7 times. Medications / Alcohol: Even over the counter medications can cause drowsiness. Medications that impair a drivers attention should have a warning label. Alcohol naturally makes you sleepy and on its own can cause accidents. Combined with excessive drowsiness its effects are amplified. Signs of Drowsy Driving: * You don't remember driving the last few miles * You may drift out of your low * You are unable to focus and your thoughts wander * You may yawn more often than normal 
 * You have difficulty keeping your eyes open / nodding off * Missing traffic signs, speeding, or tailgating Prevention-  
Good sleep hygiene, lifestyle and behavioral choices have the most impact on drowsy driving. There is no substitute for sleep and the average person requires 8 hours nightly. If you find yourself driving drowsy, stop and sleep. Consider the sleep hygiene tips provided during your visit as well.   
 
Medication Refill Policy: Refills for all medications require 1 week advance notice. Please have your pharmacy fax a refill request. We are unable to fax, or call in \"controled substance\" medications and you will need to pick these prescriptions up from our office. MyChart Activation Thank you for requesting access to [employer]. Please follow the instructions below to securely access and download your online medical record. [employer] allows you to send messages to your doctor, view your test results, renew your prescriptions, schedule appointments, and more. How Do I Sign Up? 1. In your internet browser, go to [email or web address]. 2. Click on the First Time User? Click Here link in the Sign In box. You will see the New Member Sign Up page. 3. Enter your [employer] Access Code exactly as it appears below. You will not need to use this code after youve completed the sign-up process. If you do not sign up before the expiration date, you must request a new code. [employer] Access Code: Activation code not generated Current [employer] Status: Active (This is the date your [employer] access code will ) 4. Enter the last four digits of your Social Security Number (xxxx) and Date of Birth (mm/dd/yyyy) as indicated and click Submit. You will be taken to the next sign-up page. 5. Create a [employer] ID. This will be your [employer] login ID and cannot be changed, so think of one that is secure and easy to remember. 6. Create a [employer] password. You can change your password at any time. 7. Enter your Password Reset Question and Answer. This can be used at a later time if you forget your password. 8. Enter your e-mail address. You will receive e-mail notification when new information is available in [address] 9. Click Sign Up. You can now view and download portions of your medical record. 10. Click the Download Summary menu link to download a portable copy of your medical information. Additional Information If you have questions, please call [phone]. Remember, MyChart is NOT to be used for urgent needs. For medical emergencies, dial 911. Introducing [facility]! Dear Magdy Sahni: Thank you for requesting a [employer] account. Our records indicate that you already have an active [employer] account. You can access your account anytime at [email or web address] [email or web address] Did you know that you can access your hospital and ER discharge instructions at any time in [employer]? You can also review all of your test results from your hospital stay or ER visit. Additional Information If you have questions, please visit the Frequently Asked Questions section of the [employer] website at [email or web address] Remember, [employer] is NOT to be used for urgent needs. For medical emergencies, dial 911. Now available from your iPhone and Android! Please provide this summary of care documentation to your next provider. If you have any questions after today's visit, please call [phone]. Fall

## 2023-07-13 ENCOUNTER — OFFICE VISIT (OUTPATIENT)
Facility: CLINIC | Age: 63
End: 2023-07-13

## 2023-07-13 VITALS
TEMPERATURE: 97.6 F | SYSTOLIC BLOOD PRESSURE: 167 MMHG | WEIGHT: 137.4 LBS | HEIGHT: 65 IN | BODY MASS INDEX: 22.89 KG/M2 | RESPIRATION RATE: 18 BRPM | HEART RATE: 69 BPM | DIASTOLIC BLOOD PRESSURE: 77 MMHG | OXYGEN SATURATION: 96 %

## 2023-07-13 DIAGNOSIS — I69.359 CVA, OLD, HEMIPARESIS (HCC): ICD-10-CM

## 2023-07-13 DIAGNOSIS — G93.1 ANOXIC BRAIN DAMAGE (HCC): ICD-10-CM

## 2023-07-13 DIAGNOSIS — N18.6 ESRD (END STAGE RENAL DISEASE) (HCC): Primary | ICD-10-CM

## 2023-07-13 DIAGNOSIS — J38.3 VOCAL CORD DISEASE: ICD-10-CM

## 2023-07-13 ASSESSMENT — PATIENT HEALTH QUESTIONNAIRE - PHQ9
SUM OF ALL RESPONSES TO PHQ QUESTIONS 1-9: 0
SUM OF ALL RESPONSES TO PHQ QUESTIONS 1-9: 0
2. FEELING DOWN, DEPRESSED OR HOPELESS: 0
1. LITTLE INTEREST OR PLEASURE IN DOING THINGS: 0
SUM OF ALL RESPONSES TO PHQ9 QUESTIONS 1 & 2: 0
SUM OF ALL RESPONSES TO PHQ QUESTIONS 1-9: 0
SUM OF ALL RESPONSES TO PHQ QUESTIONS 1-9: 0

## 2023-07-13 ASSESSMENT — ANXIETY QUESTIONNAIRES
5. BEING SO RESTLESS THAT IT IS HARD TO SIT STILL: 0
1. FEELING NERVOUS, ANXIOUS, OR ON EDGE: 0
6. BECOMING EASILY ANNOYED OR IRRITABLE: 0
7. FEELING AFRAID AS IF SOMETHING AWFUL MIGHT HAPPEN: 0
GAD7 TOTAL SCORE: 0
3. WORRYING TOO MUCH ABOUT DIFFERENT THINGS: 0
4. TROUBLE RELAXING: 0
IF YOU CHECKED OFF ANY PROBLEMS ON THIS QUESTIONNAIRE, HOW DIFFICULT HAVE THESE PROBLEMS MADE IT FOR YOU TO DO YOUR WORK, TAKE CARE OF THINGS AT HOME, OR GET ALONG WITH OTHER PEOPLE: NOT DIFFICULT AT ALL
2. NOT BEING ABLE TO STOP OR CONTROL WORRYING: 0

## 2023-07-13 NOTE — PROGRESS NOTES
Woody De Jesus is a 60 y.o. male    Chief Complaint   Patient presents with    3 Month Follow-Up     1. Have you been to the ER, urgent care clinic since your last visit? Hospitalized since your last visit? No    2. Have you seen or consulted any other health care providers outside of the [address] [place] [address] since your last visit? Include any pap smears or colon screening.  No

## 2023-07-13 NOTE — PROGRESS NOTES
[address] [facility]  [address] [address]  Phone:  [phone]  Fax: [phone]       Chief Complaint   Patient presents with    3 Month Follow-Up   . SUBJECTIVE:    Nieves Richardson is a 60 y.o. male  Dictation on: 07/13/2023  2:16 PM by: Lino Tamayo [[id number]]          Current Outpatient Medications   Medication Sig Dispense Refill    mirtazapine (REMERON) 15 MG tablet TAKE 1/2 TABLET AT BEDTIME 45 tablet 3    rosuvastatin (CRESTOR) 10 MG tablet TAKE 1 TABLET EVERY NIGHT 90 tablet 3    omeprazole (PRILOSEC) 20 MG delayed release capsule TAKE 1 CAPSULE EVERY DAY 90 capsule 3    aspirin 81 MG chewable tablet Take 1 tablet by mouth daily      calcium acetate (PHOSLO) 667 MG TABS Take 2 tabs three times a day      ferrous sulfate (IRON 325) 325 (65 Fe) MG tablet Take 1 tablet by mouth every morning (before breakfast)      hydrALAZINE (APRESOLINE) 50 MG tablet Take 1 tablet by mouth 2 times daily      metoprolol tartrate (LOPRESSOR) 25 MG tablet Take 0.5 tablets by mouth 2 times daily      phenytoin (DILANTIN) 100 MG ER capsule Take 1 capsule by mouth 3 times daily       No current facility-administered medications for this visit.      Past Medical History:   Diagnosis Date    Chronic kidney disease     dialysis/ [address] [facility] m-w-f    Fatigue     Gout     Hypercholesterolemia     Hypertension     Sleep apnea     cpap    Snoring     Stroke ([address])     TIA, stroke 2007, 2012     Past Surgical History:   Procedure Laterality Date    HEENT      tonsillectomy in 2002    HEENT      nasal polypectomy    KIDNEY REMOVAL  1980    kidney removed    VASCULAR SURGERY Left     neck for dialysis    VASCULAR SURGERY  01/15/2019    Creation of AV Fistula Left Arm    VASCULAR SURGERY  04/23/2019    TAVF left arm     Allergies   Allergen Reactions    Chlorhexidine Gluconate Other (See Comments)    Lisinopril Other (See Comments)    Chlorhexidine Itching         REVIEW OF

## 2023-07-14 PROBLEM — J38.3 VOCAL CORD DISEASE: Status: ACTIVE | Noted: 2023-07-14

## 2023-07-14 NOTE — PROGRESS NOTES
comes in for return visit after a long absence. He is attempting to tell me something, but it is very difficult to understand what he is saying. His wife is assisting however. Apparently he has been coughing often times occurring after eating or drinking, but that is not consistent. In any event, he coughed for a short period of time and once he expectorates mucous the coughing abates. This has been intermittent. He is doing well on dialysis. He was placed on anticonvulsants in the past presumably related to the stroke that he had. He is on extended release phenytoin at 300 mg q.h.s. He was seeing Dr. Denita Grimaldo who did an EEG according to the wife which was apparently negative for seizure activity. Apparently the phenytoin was continued in spite of that. He had a barium swallow which did not reveal any aspiration. Finally the wife wants to go home to [facility] sometime in November at the request of the .

## 2023-07-15 NOTE — PROGRESS NOTES
1. The patient has end-stage renal disease and goes to dialysis on a regular basis. He is having no problems with dialysis. 2. He did have a CVA with mild left hemiparesis. This is gradually improving. He has significant cognitive dysfunction associated with this and apparent element of anoxic brain damage. The details of this are bit muggy for me at this point. 3. He has coughing often times occurring after eating, but is short lived. A modified barium swallow revealed no aspiration although there was penetration to the level of the cords. There is not much more that can be done because his eating has improved significantly. I suggest to the wife that she give him soft foods with possibly thickened liquids. She is really not anxious to do so. 4. [place]

## 2023-10-26 ENCOUNTER — OFFICE VISIT (OUTPATIENT)
Facility: CLINIC | Age: 63
End: 2023-10-26

## 2023-10-26 ENCOUNTER — OFFICE VISIT (OUTPATIENT)
Age: 63
End: 2023-10-26
Payer: MEDICARE

## 2023-10-26 VITALS
HEIGHT: 65 IN | DIASTOLIC BLOOD PRESSURE: 78 MMHG | WEIGHT: 131.8 LBS | HEART RATE: 77 BPM | OXYGEN SATURATION: 99 % | BODY MASS INDEX: 21.96 KG/M2 | SYSTOLIC BLOOD PRESSURE: 148 MMHG

## 2023-10-26 VITALS
DIASTOLIC BLOOD PRESSURE: 69 MMHG | HEIGHT: 65 IN | BODY MASS INDEX: 21.92 KG/M2 | WEIGHT: 131.6 LBS | TEMPERATURE: 98.1 F | SYSTOLIC BLOOD PRESSURE: 138 MMHG | RESPIRATION RATE: 18 BRPM | HEART RATE: 59 BPM | OXYGEN SATURATION: 99 %

## 2023-10-26 DIAGNOSIS — I69.359 CVA, OLD, HEMIPARESIS (HCC): ICD-10-CM

## 2023-10-26 DIAGNOSIS — Z86.73 REMOTE HISTORY OF STROKE: Primary | ICD-10-CM

## 2023-10-26 DIAGNOSIS — N18.6 ESRD (END STAGE RENAL DISEASE) (HCC): Primary | ICD-10-CM

## 2023-10-26 DIAGNOSIS — F33.0 MAJOR DEPRESSIVE DISORDER, RECURRENT, MILD (HCC): ICD-10-CM

## 2023-10-26 DIAGNOSIS — E78.5 HYPERLIPIDEMIA, UNSPECIFIED HYPERLIPIDEMIA TYPE: ICD-10-CM

## 2023-10-26 DIAGNOSIS — R56.9 SEIZURE-LIKE ACTIVITY (HCC): ICD-10-CM

## 2023-10-26 DIAGNOSIS — E78.5 DYSLIPIDEMIA: ICD-10-CM

## 2023-10-26 DIAGNOSIS — G93.1 ANOXIC BRAIN DAMAGE (HCC): ICD-10-CM

## 2023-10-26 DIAGNOSIS — I10 ESSENTIAL HYPERTENSION: ICD-10-CM

## 2023-10-26 DIAGNOSIS — I10 ESSENTIAL (PRIMARY) HYPERTENSION: ICD-10-CM

## 2023-10-26 PROCEDURE — 99214 OFFICE O/P EST MOD 30 MIN: CPT | Performed by: PSYCHIATRY & NEUROLOGY

## 2023-10-26 PROCEDURE — G8420 CALC BMI NORM PARAMETERS: HCPCS | Performed by: PSYCHIATRY & NEUROLOGY

## 2023-10-26 PROCEDURE — 3075F SYST BP GE 130 - 139MM HG: CPT | Performed by: PSYCHIATRY & NEUROLOGY

## 2023-10-26 PROCEDURE — G8427 DOCREV CUR MEDS BY ELIG CLIN: HCPCS | Performed by: PSYCHIATRY & NEUROLOGY

## 2023-10-26 PROCEDURE — 3078F DIAST BP <80 MM HG: CPT | Performed by: PSYCHIATRY & NEUROLOGY

## 2023-10-26 PROCEDURE — G8484 FLU IMMUNIZE NO ADMIN: HCPCS | Performed by: PSYCHIATRY & NEUROLOGY

## 2023-10-26 PROCEDURE — 1036F TOBACCO NON-USER: CPT | Performed by: PSYCHIATRY & NEUROLOGY

## 2023-10-26 PROCEDURE — 3017F COLORECTAL CA SCREEN DOC REV: CPT | Performed by: PSYCHIATRY & NEUROLOGY

## 2023-10-26 RX ORDER — AMLODIPINE BESYLATE 5 MG/1
5 TABLET ORAL DAILY
COMMUNITY

## 2023-10-26 RX ORDER — CLONIDINE HYDROCHLORIDE 0.3 MG/1
TABLET ORAL
COMMUNITY
Start: 2017-08-18

## 2023-10-26 ASSESSMENT — PATIENT HEALTH QUESTIONNAIRE - PHQ9
SUM OF ALL RESPONSES TO PHQ9 QUESTIONS 1 & 2: 0
SUM OF ALL RESPONSES TO PHQ QUESTIONS 1-9: 0
2. FEELING DOWN, DEPRESSED OR HOPELESS: 0
SUM OF ALL RESPONSES TO PHQ QUESTIONS 1-9: 0
1. LITTLE INTEREST OR PLEASURE IN DOING THINGS: 0

## 2023-10-26 NOTE — PROGRESS NOTES
Est, Glomerular Filtrati* 05/04/2023 7 (L)     Bun/Cre Ratio 05/04/2023 4 (L)     Sodium 05/04/2023 140     Potassium 05/04/2023 4.3     Chloride 05/04/2023 94 (L)     CO2 05/04/2023 26     Calcium 05/04/2023 10.1     Total Protein 05/04/2023 7.3     Albumin 05/04/2023 4.5     Globulin, Total 05/04/2023 2.8     Albumin/Globulin Ratio 05/04/2023 1.6     Total Bilirubin 05/04/2023 0.3     Alkaline Phosphatase 05/04/2023 68     AST 05/04/2023 9     ALT 05/04/2023 5        IMAGING:  MRI Result (most recent):  No results found for this or any previous visit from the past 3650 days. Assessment:      Diagnosis Orders   1. Remote history of stroke        2. Essential (primary) hypertension        3. Hyperlipidemia, unspecified hyperlipidemia type        4. Seizure-like activity ([address])        58 y.o.  male referred for evaluation of recurrent strokes affecting the distal RLE 2007, reported TIA 2012 and more recent acute L-HP/dysarthria associated with R MCA infarct following cardiac arrest 8/2022 with question of L focal motor seizure activity during admission. Examination reveals mod/severe dysarthria, L facial asymmetry, L-HP, reduced LLE sensation, R distal foot spasticity (R AFO), wheelchair dependent. Pt/family deny h/o Afib. He is maintained currently on ASA/statin therapy for stroke prevention and denies recurrent focal deficits since above. No further seizure-like activity witnessed since his discharge from the hospital. Recent EEG monitoring did not reveal evidence of epileptiform activity, noted focal R frontotemporal slowing. Discussed weaning off of PHT and monitoring for any recurrence of focal motor seizure activity. His wife is a nurse (formerly at [employer] until her 's last stroke) and is currently providing 24/7 home care.    Plan:   Continue ASA/statin therapy for stroke prevention  Wean off PHT, PHT 100mg BID x 1 week, then 100mg daily x 1 week, then discontinue       Return in about 6 months

## 2023-10-26 NOTE — PROGRESS NOTES
[address] [facility]  [address]  [address] [address]  Phone:  [phone]  Fax: [phone]       Chief Complaint   Patient presents with    Hypertension   . SUBJECTIVE:    Ashia Appiah is a 58 y.o. male  Dictation on: 10/26/2023 11:23 AM by: Maura Burciaga [[id number]]            Current Outpatient Medications   Medication Sig Dispense Refill    cloNIDine (CATAPRES) 0.3 MG tablet 1 Every 12 h      amLODIPine (NORVASC) 5 MG tablet Take 1 tablet by mouth daily      mirtazapine (REMERON) 15 MG tablet TAKE 1/2 TABLET AT BEDTIME 45 tablet 3    rosuvastatin (CRESTOR) 10 MG tablet TAKE 1 TABLET EVERY NIGHT 90 tablet 3    omeprazole (PRILOSEC) 20 MG delayed release capsule TAKE 1 CAPSULE EVERY DAY 90 capsule 3    aspirin 81 MG chewable tablet Take 1 tablet by mouth daily      calcium acetate (PHOSLO) 667 MG TABS Takes 3 tablets 3 times a day      ferrous sulfate (IRON 325) 325 (65 Fe) MG tablet Take 1 tablet by mouth every morning (before breakfast)      hydrALAZINE (APRESOLINE) 50 MG tablet Take 1 tablet by mouth 3 times daily      metoprolol tartrate (LOPRESSOR) 25 MG tablet Take 0.5 tablets by mouth 2 times daily      phenytoin (DILANTIN) 100 MG ER capsule Take 1 capsule by mouth 3 times daily       No current facility-administered medications for this visit.      Past Medical History:   Diagnosis Date    Chronic kidney disease     dialysis/ [address] [facility] m-w-f    Fatigue     Gout     Hypercholesterolemia     Hypertension     Sleep apnea     cpap    Snoring     Stroke ([address] [facility])     TIA, stroke 2007, 2012     Past Surgical History:   Procedure Laterality Date    HEENT      tonsillectomy in 2002    HEENT      nasal polypectomy    KIDNEY REMOVAL  1980    kidney removed    VASCULAR SURGERY Left     neck for dialysis    VASCULAR SURGERY  01/15/2019    Creation of AV Fistula Left Arm    VASCULAR SURGERY  04/23/2019    TAVF left arm     Allergies   Allergen Reactions

## 2023-10-27 NOTE — PROGRESS NOTES
comes in for return visit. He is brought in by his wife. He has been doing reasonably well according to her. He is going to dialysis three days a week with no problems. Blood pressure is minimally elevated, but he is now on clonidine 0.2 mg b.i.d., amlodipine 5 mg q.d. and hydralazine 50 mg t.i.d. He is following up with Cardiology who is addressing the blood pressure medication. He has had intermittent cough without wheezing off late. He apparently has allergically mediated symptoms during the spring and she gives him a nonsedating antihistamine. He has from a GI perspective slight dilatation of the bile duct and gastroenterologist is in the process of working this up. MRI is pending in the first part of the year. He had a colonoscopy done 03/16/2022 at [address] [place] [address]. He walks with a walker for short distances. He is now attempting to get Medicaid, he has it, but he is going to spend down more with it. Finally he needs to get his long term care activated.

## 2023-10-28 PROBLEM — F33.0 MAJOR DEPRESSIVE DISORDER, RECURRENT, MILD (HCC): Status: ACTIVE | Noted: 2023-10-28

## 2024-02-01 ENCOUNTER — OFFICE VISIT (OUTPATIENT)
Facility: CLINIC | Age: 64
End: 2024-02-01

## 2024-02-01 VITALS
BODY MASS INDEX: 21.26 KG/M2 | SYSTOLIC BLOOD PRESSURE: 110 MMHG | HEART RATE: 69 BPM | WEIGHT: 127.6 LBS | HEIGHT: 65 IN | TEMPERATURE: 98.4 F | DIASTOLIC BLOOD PRESSURE: 63 MMHG | RESPIRATION RATE: 16 BRPM | OXYGEN SATURATION: 100 %

## 2024-02-01 DIAGNOSIS — I10 ESSENTIAL HYPERTENSION: ICD-10-CM

## 2024-02-01 DIAGNOSIS — N18.6 ESRD (END STAGE RENAL DISEASE) (HCC): ICD-10-CM

## 2024-02-01 DIAGNOSIS — Z87.891 PERSONAL HISTORY OF TOBACCO USE: ICD-10-CM

## 2024-02-01 DIAGNOSIS — F33.0 MAJOR DEPRESSIVE DISORDER, RECURRENT, MILD (HCC): ICD-10-CM

## 2024-02-01 DIAGNOSIS — R56.9 SEIZURE-LIKE ACTIVITY (HCC): ICD-10-CM

## 2024-02-01 DIAGNOSIS — Z00.00 MEDICARE ANNUAL WELLNESS VISIT, SUBSEQUENT: Primary | ICD-10-CM

## 2024-02-01 DIAGNOSIS — G93.1 ANOXIC BRAIN DAMAGE (HCC): ICD-10-CM

## 2024-02-01 SDOH — ECONOMIC STABILITY: HOUSING INSECURITY
IN THE LAST 12 MONTHS, WAS THERE A TIME WHEN YOU DID NOT HAVE A STEADY PLACE TO SLEEP OR SLEPT IN A SHELTER (INCLUDING NOW)?: NO

## 2024-02-01 SDOH — ECONOMIC STABILITY: INCOME INSECURITY: HOW HARD IS IT FOR YOU TO PAY FOR THE VERY BASICS LIKE FOOD, HOUSING, MEDICAL CARE, AND HEATING?: NOT HARD AT ALL

## 2024-02-01 SDOH — ECONOMIC STABILITY: FOOD INSECURITY: WITHIN THE PAST 12 MONTHS, THE FOOD YOU BOUGHT JUST DIDN'T LAST AND YOU DIDN'T HAVE MONEY TO GET MORE.: NEVER TRUE

## 2024-02-01 SDOH — ECONOMIC STABILITY: FOOD INSECURITY: WITHIN THE PAST 12 MONTHS, YOU WORRIED THAT YOUR FOOD WOULD RUN OUT BEFORE YOU GOT MONEY TO BUY MORE.: NEVER TRUE

## 2024-02-01 ASSESSMENT — ANXIETY QUESTIONNAIRES
1. FEELING NERVOUS, ANXIOUS, OR ON EDGE: 0
7. FEELING AFRAID AS IF SOMETHING AWFUL MIGHT HAPPEN: 0
6. BECOMING EASILY ANNOYED OR IRRITABLE: 0
GAD7 TOTAL SCORE: 0
3. WORRYING TOO MUCH ABOUT DIFFERENT THINGS: 0
IF YOU CHECKED OFF ANY PROBLEMS ON THIS QUESTIONNAIRE, HOW DIFFICULT HAVE THESE PROBLEMS MADE IT FOR YOU TO DO YOUR WORK, TAKE CARE OF THINGS AT HOME, OR GET ALONG WITH OTHER PEOPLE: NOT DIFFICULT AT ALL
2. NOT BEING ABLE TO STOP OR CONTROL WORRYING: 0
4. TROUBLE RELAXING: 0
5. BEING SO RESTLESS THAT IT IS HARD TO SIT STILL: 0

## 2024-02-01 ASSESSMENT — PATIENT HEALTH QUESTIONNAIRE - PHQ9
2. FEELING DOWN, DEPRESSED OR HOPELESS: 0
SUM OF ALL RESPONSES TO PHQ QUESTIONS 1-9: 0
8. MOVING OR SPEAKING SO SLOWLY THAT OTHER PEOPLE COULD HAVE NOTICED. OR THE OPPOSITE, BEING SO FIGETY OR RESTLESS THAT YOU HAVE BEEN MOVING AROUND A LOT MORE THAN USUAL: 0
4. FEELING TIRED OR HAVING LITTLE ENERGY: 0
SUM OF ALL RESPONSES TO PHQ QUESTIONS 1-9: 0
10. IF YOU CHECKED OFF ANY PROBLEMS, HOW DIFFICULT HAVE THESE PROBLEMS MADE IT FOR YOU TO DO YOUR WORK, TAKE CARE OF THINGS AT HOME, OR GET ALONG WITH OTHER PEOPLE: 0
10. IF YOU CHECKED OFF ANY PROBLEMS, HOW DIFFICULT HAVE THESE PROBLEMS MADE IT FOR YOU TO DO YOUR WORK, TAKE CARE OF THINGS AT HOME, OR GET ALONG WITH OTHER PEOPLE: 0
SUM OF ALL RESPONSES TO PHQ QUESTIONS 1-9: 0
6. FEELING BAD ABOUT YOURSELF - OR THAT YOU ARE A FAILURE OR HAVE LET YOURSELF OR YOUR FAMILY DOWN: 0
3. TROUBLE FALLING OR STAYING ASLEEP: 0
9. THOUGHTS THAT YOU WOULD BE BETTER OFF DEAD, OR OF HURTING YOURSELF: 0
8. MOVING OR SPEAKING SO SLOWLY THAT OTHER PEOPLE COULD HAVE NOTICED. OR THE OPPOSITE, BEING SO FIGETY OR RESTLESS THAT YOU HAVE BEEN MOVING AROUND A LOT MORE THAN USUAL: 0
6. FEELING BAD ABOUT YOURSELF - OR THAT YOU ARE A FAILURE OR HAVE LET YOURSELF OR YOUR FAMILY DOWN: 0
SUM OF ALL RESPONSES TO PHQ9 QUESTIONS 1 & 2: 0
5. POOR APPETITE OR OVEREATING: 0
7. TROUBLE CONCENTRATING ON THINGS, SUCH AS READING THE NEWSPAPER OR WATCHING TELEVISION: 0
SUM OF ALL RESPONSES TO PHQ QUESTIONS 1-9: 0
1. LITTLE INTEREST OR PLEASURE IN DOING THINGS: 0
9. THOUGHTS THAT YOU WOULD BE BETTER OFF DEAD, OR OF HURTING YOURSELF: 0
5. POOR APPETITE OR OVEREATING: 0
SUM OF ALL RESPONSES TO PHQ QUESTIONS 1-9: 0
SUM OF ALL RESPONSES TO PHQ9 QUESTIONS 1 & 2: 0
2. FEELING DOWN, DEPRESSED OR HOPELESS: 0
SUM OF ALL RESPONSES TO PHQ QUESTIONS 1-9: 0
4. FEELING TIRED OR HAVING LITTLE ENERGY: 0
3. TROUBLE FALLING OR STAYING ASLEEP: 0
SUM OF ALL RESPONSES TO PHQ QUESTIONS 1-9: 0
SUM OF ALL RESPONSES TO PHQ QUESTIONS 1-9: 0
1. LITTLE INTEREST OR PLEASURE IN DOING THINGS: 0
7. TROUBLE CONCENTRATING ON THINGS, SUCH AS READING THE NEWSPAPER OR WATCHING TELEVISION: 0

## 2024-02-01 ASSESSMENT — LIFESTYLE VARIABLES
HOW MANY STANDARD DRINKS CONTAINING ALCOHOL DO YOU HAVE ON A TYPICAL DAY: PATIENT DOES NOT DRINK
HOW OFTEN DO YOU HAVE A DRINK CONTAINING ALCOHOL: NEVER

## 2024-02-01 NOTE — PROGRESS NOTES
Chief Complaint   Patient presents with    Medicare AWV     \"Have you been to the ER, urgent care clinic since your last visit?  Hospitalized since your last visit?\"    NO    “Have you seen or consulted any other health care providers outside of [facility] since your last visit?”    NO

## 2024-02-04 RX ORDER — ASPIRIN 81 MG/1
81 TABLET, CHEWABLE ORAL DAILY
Qty: 30 TABLET | Refills: 11 | Status: SHIPPED | OUTPATIENT
Start: 2024-02-04

## 2024-05-02 ENCOUNTER — OFFICE VISIT (OUTPATIENT)
Age: 64
End: 2024-05-02
Payer: MEDICARE

## 2024-05-02 VITALS
BODY MASS INDEX: 21.64 KG/M2 | DIASTOLIC BLOOD PRESSURE: 70 MMHG | HEART RATE: 69 BPM | SYSTOLIC BLOOD PRESSURE: 128 MMHG | OXYGEN SATURATION: 98 % | WEIGHT: 130.07 LBS

## 2024-05-02 DIAGNOSIS — Z86.73 REMOTE HISTORY OF STROKE: Primary | ICD-10-CM

## 2024-05-02 DIAGNOSIS — I10 ESSENTIAL HYPERTENSION: ICD-10-CM

## 2024-05-02 DIAGNOSIS — E78.5 HYPERLIPIDEMIA, UNSPECIFIED HYPERLIPIDEMIA TYPE: ICD-10-CM

## 2024-05-02 PROCEDURE — 3074F SYST BP LT 130 MM HG: CPT | Performed by: PSYCHIATRY & NEUROLOGY

## 2024-05-02 PROCEDURE — 99214 OFFICE O/P EST MOD 30 MIN: CPT | Performed by: PSYCHIATRY & NEUROLOGY

## 2024-05-02 PROCEDURE — G8427 DOCREV CUR MEDS BY ELIG CLIN: HCPCS | Performed by: PSYCHIATRY & NEUROLOGY

## 2024-05-02 PROCEDURE — 1036F TOBACCO NON-USER: CPT | Performed by: PSYCHIATRY & NEUROLOGY

## 2024-05-02 PROCEDURE — G8420 CALC BMI NORM PARAMETERS: HCPCS | Performed by: PSYCHIATRY & NEUROLOGY

## 2024-05-02 PROCEDURE — 3078F DIAST BP <80 MM HG: CPT | Performed by: PSYCHIATRY & NEUROLOGY

## 2024-05-02 PROCEDURE — 3017F COLORECTAL CA SCREEN DOC REV: CPT | Performed by: PSYCHIATRY & NEUROLOGY

## 2024-05-02 RX ORDER — CALCIUM ACETATE 667 MG/1
1 CAPSULE ORAL
COMMUNITY

## 2024-05-02 ASSESSMENT — ENCOUNTER SYMPTOMS: TROUBLE SWALLOWING: 0

## 2024-05-02 NOTE — PROGRESS NOTES
I have reviewed the documentation provided by the nurse practitioner, discussed her findings, clinical impression, and the proposed management plans with regards to this encounter.  I have personally evaluated the patient and verified the history and confirmed the physical findings.

## 2024-05-02 NOTE — PROGRESS NOTES
Neurology Clinic Follow up Note    Patient ID:   Phayvanjackson Douangphoumy  1960  63 y.o. male  [id number]      Chief Complaint   Patient presents with    OTHER     Pt  returning for H/O Stroke  Pt reports no new issues no hospital visits         Last Appointment With Me:    None, last seen by Dr Delgadillo in 10/26/2023.   \" 63 y.o.  male referred for evaluation of recurrent strokes affecting the distal RLE 2007, reported TIA 2012 and more recent acute L-HP/dysarthria associated with R MCA infarct following cardiac arrest 8/2022 with question of L focal motor seizure activity during admission. Examination reveals mod/severe dysarthria, L facial asymmetry, L-HP, reduced LLE sensation, R distal foot spasticity (R AFO), wheelchair dependent. Pt/family deny h/o Afib. He is maintained currently on ASA/statin therapy for stroke prevention and denies recurrent focal deficits since above. No further seizure-like activity witnessed since his discharge from the hospital. Recent EEG monitoring did not reveal evidence of epileptiform activity, noted focal R frontotemporal slowing. Discussed weaning off of PHT and monitoring for any recurrence of focal motor seizure activity. His wife is a nurse (formerly at [employer] until her 's last stroke) and is currently providing 24/7 home care. Continue ASA/statin therapy for stroke prevention. Wean off PHT, PHT 100mg BID x 1 week, then 100mg daily x 1 week, then discontinue\"    Interval History:     Here today with his wife. Patient reports no seizures in the interim. Wife assists with ADL's.   Speech is improved.   Continues to take ASA, Crestor. Following up with PCP in 6/11/2024.   Able to walk with assistance of BUE walker. He is using a wheelchair, walking less due to painful corn in the right foot. PT completed last year. Seeing Podiatry.     Past Medical History:   Diagnosis Date    Chronic kidney disease     [facility]    Fatigue     Gout

## 2024-06-04 RX ORDER — OMEPRAZOLE 20 MG/1
CAPSULE, DELAYED RELEASE ORAL
Qty: 90 CAPSULE | Refills: 3 | Status: SHIPPED | OUTPATIENT
Start: 2024-06-04

## 2024-06-04 RX ORDER — ROSUVASTATIN CALCIUM 10 MG/1
TABLET, COATED ORAL
Qty: 90 TABLET | Refills: 3 | Status: SHIPPED | OUTPATIENT
Start: 2024-06-04

## 2024-06-04 RX ORDER — MIRTAZAPINE 15 MG/1
TABLET, FILM COATED ORAL
Qty: 45 TABLET | Refills: 3 | Status: SHIPPED | OUTPATIENT
Start: 2024-06-04

## 2024-06-11 ENCOUNTER — OFFICE VISIT (OUTPATIENT)
Facility: CLINIC | Age: 64
End: 2024-06-11
Payer: MEDICARE

## 2024-06-11 VITALS
HEIGHT: 65 IN | HEART RATE: 58 BPM | BODY MASS INDEX: 20.49 KG/M2 | TEMPERATURE: 97.8 F | WEIGHT: 123 LBS | RESPIRATION RATE: 16 BRPM | SYSTOLIC BLOOD PRESSURE: 121 MMHG | OXYGEN SATURATION: 98 % | DIASTOLIC BLOOD PRESSURE: 71 MMHG

## 2024-06-11 DIAGNOSIS — I10 ESSENTIAL HYPERTENSION: ICD-10-CM

## 2024-06-11 DIAGNOSIS — D50.8 OTHER IRON DEFICIENCY ANEMIA: ICD-10-CM

## 2024-06-11 DIAGNOSIS — E78.5 DYSLIPIDEMIA: Primary | ICD-10-CM

## 2024-06-11 DIAGNOSIS — R56.9 SEIZURE-LIKE ACTIVITY (HCC): ICD-10-CM

## 2024-06-11 DIAGNOSIS — I69.359 CVA, OLD, HEMIPARESIS (HCC): ICD-10-CM

## 2024-06-11 DIAGNOSIS — N18.6 ESRD (END STAGE RENAL DISEASE) (HCC): ICD-10-CM

## 2024-06-11 DIAGNOSIS — J31.0 CHRONIC RHINITIS: ICD-10-CM

## 2024-06-11 DIAGNOSIS — G93.1 ANOXIC BRAIN DAMAGE (HCC): ICD-10-CM

## 2024-06-11 PROCEDURE — 1036F TOBACCO NON-USER: CPT | Performed by: INTERNAL MEDICINE

## 2024-06-11 PROCEDURE — G8420 CALC BMI NORM PARAMETERS: HCPCS | Performed by: INTERNAL MEDICINE

## 2024-06-11 PROCEDURE — 3017F COLORECTAL CA SCREEN DOC REV: CPT | Performed by: INTERNAL MEDICINE

## 2024-06-11 PROCEDURE — G8427 DOCREV CUR MEDS BY ELIG CLIN: HCPCS | Performed by: INTERNAL MEDICINE

## 2024-06-11 PROCEDURE — 3074F SYST BP LT 130 MM HG: CPT | Performed by: INTERNAL MEDICINE

## 2024-06-11 PROCEDURE — 99215 OFFICE O/P EST HI 40 MIN: CPT | Performed by: INTERNAL MEDICINE

## 2024-06-11 PROCEDURE — 3078F DIAST BP <80 MM HG: CPT | Performed by: INTERNAL MEDICINE

## 2024-06-11 SDOH — ECONOMIC STABILITY: FOOD INSECURITY: WITHIN THE PAST 12 MONTHS, YOU WORRIED THAT YOUR FOOD WOULD RUN OUT BEFORE YOU GOT MONEY TO BUY MORE.: SOMETIMES TRUE

## 2024-06-11 SDOH — ECONOMIC STABILITY: INCOME INSECURITY: HOW HARD IS IT FOR YOU TO PAY FOR THE VERY BASICS LIKE FOOD, HOUSING, MEDICAL CARE, AND HEATING?: SOMEWHAT HARD

## 2024-06-11 SDOH — ECONOMIC STABILITY: FOOD INSECURITY: WITHIN THE PAST 12 MONTHS, THE FOOD YOU BOUGHT JUST DIDN'T LAST AND YOU DIDN'T HAVE MONEY TO GET MORE.: SOMETIMES TRUE

## 2024-06-11 SDOH — ECONOMIC STABILITY: HOUSING INSECURITY
IN THE LAST 12 MONTHS, WAS THERE A TIME WHEN YOU DID NOT HAVE A STEADY PLACE TO SLEEP OR SLEPT IN A SHELTER (INCLUDING NOW)?: YES

## 2024-06-11 ASSESSMENT — PATIENT HEALTH QUESTIONNAIRE - PHQ9
10. IF YOU CHECKED OFF ANY PROBLEMS, HOW DIFFICULT HAVE THESE PROBLEMS MADE IT FOR YOU TO DO YOUR WORK, TAKE CARE OF THINGS AT HOME, OR GET ALONG WITH OTHER PEOPLE: NOT DIFFICULT AT ALL
SUM OF ALL RESPONSES TO PHQ QUESTIONS 1-9: 0
SUM OF ALL RESPONSES TO PHQ QUESTIONS 1-9: 0
8. MOVING OR SPEAKING SO SLOWLY THAT OTHER PEOPLE COULD HAVE NOTICED. OR THE OPPOSITE, BEING SO FIGETY OR RESTLESS THAT YOU HAVE BEEN MOVING AROUND A LOT MORE THAN USUAL: NOT AT ALL
3. TROUBLE FALLING OR STAYING ASLEEP: NOT AT ALL
6. FEELING BAD ABOUT YOURSELF - OR THAT YOU ARE A FAILURE OR HAVE LET YOURSELF OR YOUR FAMILY DOWN: NOT AT ALL
SUM OF ALL RESPONSES TO PHQ QUESTIONS 1-9: 0
SUM OF ALL RESPONSES TO PHQ QUESTIONS 1-9: 0
9. THOUGHTS THAT YOU WOULD BE BETTER OFF DEAD, OR OF HURTING YOURSELF: NOT AT ALL
4. FEELING TIRED OR HAVING LITTLE ENERGY: NOT AT ALL
SUM OF ALL RESPONSES TO PHQ9 QUESTIONS 1 & 2: 0
2. FEELING DOWN, DEPRESSED OR HOPELESS: NOT AT ALL
7. TROUBLE CONCENTRATING ON THINGS, SUCH AS READING THE NEWSPAPER OR WATCHING TELEVISION: NOT AT ALL
1. LITTLE INTEREST OR PLEASURE IN DOING THINGS: NOT AT ALL
5. POOR APPETITE OR OVEREATING: NOT AT ALL

## 2024-06-11 ASSESSMENT — ANXIETY QUESTIONNAIRES
3. WORRYING TOO MUCH ABOUT DIFFERENT THINGS: NOT AT ALL
6. BECOMING EASILY ANNOYED OR IRRITABLE: NOT AT ALL
2. NOT BEING ABLE TO STOP OR CONTROL WORRYING: NOT AT ALL
IF YOU CHECKED OFF ANY PROBLEMS ON THIS QUESTIONNAIRE, HOW DIFFICULT HAVE THESE PROBLEMS MADE IT FOR YOU TO DO YOUR WORK, TAKE CARE OF THINGS AT HOME, OR GET ALONG WITH OTHER PEOPLE: NOT DIFFICULT AT ALL
4. TROUBLE RELAXING: NOT AT ALL
7. FEELING AFRAID AS IF SOMETHING AWFUL MIGHT HAPPEN: NOT AT ALL
GAD7 TOTAL SCORE: 0
1. FEELING NERVOUS, ANXIOUS, OR ON EDGE: NOT AT ALL
5. BEING SO RESTLESS THAT IT IS HARD TO SIT STILL: NOT AT ALL

## 2024-06-12 LAB
BASOPHILS # BLD: 0.1 K/UL (ref 0–0.1)
BASOPHILS NFR BLD: 2 % (ref 0–1)
CHOLEST SERPL-MCNC: 158 MG/DL
DIFFERENTIAL METHOD BLD: ABNORMAL
EOSINOPHIL # BLD: 1.1 K/UL (ref 0–0.4)
EOSINOPHIL NFR BLD: 20 % (ref 0–7)
ERYTHROCYTE [DISTWIDTH] IN BLOOD BY AUTOMATED COUNT: 15.4 % (ref 11.5–14.5)
FERRITIN SERPL-MCNC: 1030 NG/ML (ref 26–388)
HCT VFR BLD AUTO: 40.2 % (ref 36.6–50.3)
HDLC SERPL-MCNC: 44 MG/DL
HDLC SERPL: 3.6 (ref 0–5)
HGB BLD-MCNC: 12.6 G/DL (ref 12.1–17)
IMM GRANULOCYTES # BLD AUTO: 0 K/UL (ref 0–0.04)
IMM GRANULOCYTES NFR BLD AUTO: 0 % (ref 0–0.5)
LDLC SERPL CALC-MCNC: 78 MG/DL (ref 0–100)
LYMPHOCYTES # BLD: 1.2 K/UL (ref 0.8–3.5)
LYMPHOCYTES NFR BLD: 22 % (ref 12–49)
MCH RBC QN AUTO: 27.2 PG (ref 26–34)
MCHC RBC AUTO-ENTMCNC: 31.3 G/DL (ref 30–36.5)
MCV RBC AUTO: 86.8 FL (ref 80–99)
MONOCYTES # BLD: 0.5 K/UL (ref 0–1)
MONOCYTES NFR BLD: 9 % (ref 5–13)
NEUTS SEG # BLD: 2.6 K/UL (ref 1.8–8)
NEUTS SEG NFR BLD: 47 % (ref 32–75)
NRBC # BLD: 0 K/UL (ref 0–0.01)
NRBC BLD-RTO: 0 PER 100 WBC
PLATELET # BLD AUTO: 248 K/UL (ref 150–400)
PMV BLD AUTO: 10.2 FL (ref 8.9–12.9)
RBC # BLD AUTO: 4.63 M/UL (ref 4.1–5.7)
RBC MORPH BLD: ABNORMAL
TRIGL SERPL-MCNC: 180 MG/DL
VLDLC SERPL CALC-MCNC: 36 MG/DL
WBC # BLD AUTO: 5.5 K/UL (ref 4.1–11.1)

## 2024-06-13 RX ORDER — GUAIFENESIN 600 MG/1
600 TABLET, EXTENDED RELEASE ORAL 2 TIMES DAILY
COMMUNITY

## 2024-06-13 RX ORDER — FLUTICASONE PROPIONATE 50 MCG
2 SPRAY, SUSPENSION (ML) NASAL DAILY
COMMUNITY
Start: 2013-05-31

## 2024-06-18 ENCOUNTER — TELEPHONE (OUTPATIENT)
Facility: CLINIC | Age: 64
End: 2024-06-18

## 2024-06-18 NOTE — TELEPHONE ENCOUNTER
----- Message from Reji Degroot MD sent at 6/16/2024  1:54 PM EDT -----  Let wife know that patient is receiving too much iron which could potentially damage the liver

## 2024-06-18 NOTE — TELEPHONE ENCOUNTER
Patient's wife notified that patient is receiving too much iron which can damage his liver per Dr. Degroot.

## 2024-06-20 PROBLEM — J30.0 VASOMOTOR RHINITIS: Status: ACTIVE | Noted: 2024-06-20

## 2024-06-20 RX ORDER — FLUTICASONE PROPIONATE 50 MCG
2 SPRAY, SUSPENSION (ML) NASAL DAILY
Qty: 48 G | Refills: 3 | Status: SHIPPED | OUTPATIENT
Start: 2024-06-20

## 2024-06-21 NOTE — PROGRESS NOTES
1. The patient has had an old CVA.  It has been reasonably stable.  Principal manifestation included a right hemiparesis, as well as significant cognitive dysfunction, which has improved somewhat.  2. Hospitalization last year for cardiac arrest, but etiology could not be found.  He improved to the point where he was back to his baseline.  He had no myocardial infarction at the time and his LV function was entirely normal.  3. He has chronic iron deficiency anemia according to his wife and she would like to have his serum ferritin checked because of this.  I have no documentation of this, however.  4. I suspect he has a chronic rhinitis as the etiology of the secretions.  Empiric treatment with Fluticasone nasal spray, two sprays each nostril once a day.  5. He has end stage renal disease and will continue dialysis Monday, Wednesday and Friday.  6. He has anoxic brain damage leading to the cognitive dysfunction previously eluded to earlier from his previous CVA.  7. As a direct result of brain damage that was done at the time of the CVA, he did have a seizure disorder, which has needless to say continued.  It was suggested by neurology that the patient take his phenytoin 100 mg t.i.d. via the elixir.  8. Blood pressure is reasonable today, no adjustments are made.  9. His communication skills are not improving and everything is obtained from his wife.    
Addendum:  Records were reviewed from [facility] to elucidate the events of last year during his hospital stay in the ICU.    
Chief Complaint   Patient presents with    Follow-up    Congestion    Cough   Patient states \" he has a lot of mucus and chest congestion.\"  \"Have you been to the ER, urgent care clinic since your last visit?  Hospitalized since your last visit?\"    NO    “Have you seen or consulted any other health care providers outside of [facility] since your last visit?”    NO          Click Here for Release of Records Request  
Comes in for return visit accompanied by his wife.  All the information is obtained from her.  There is a language problem and he has a very difficult time communicating.  He has a device that is somewhat of a thumper that he puts on his neck to get rid of his secretions.  She thinks that he has secretions, but not entirely sure, where they are emanating from, either chest or above the neck region.  On further questioning, it appears that this is probably more of a postnasal drip that is causing the problem.  I have no idea what he is doing with this device.  Most of the time I am only at the behest of the patient's wife as to the things he is attempting to communicate.    He was hospitalized in 2023 for cardiac rest, being admitted to [facility]. He was placed on a ventilator and given cardioactive medications with improvement, to the point where he was back to his baseline.  He has a normal EF at the time and it was unclear as to the reason for the arrest.  He did have seizures during that period, but was not taking his anticonvulsant, which is phenytoin 100 mg t.i.d.    He did have hemodialysis during that admission and returned basically to his baseline, except Hydralazine was completely discontinued and he is maintained on Metoprolol Succinate 100 mg b.i.d., along with Rosuvastatin 20 mg q.d., along with several other medications.    He continues with dialysis Monday, Wednesday and Friday.    
Dictation on: 06/20/2024  3:22 PM by: MIGUE DEGROOT [[id number]]     Orders Placed This Encounter   Procedures    Lipid Panel     Standing Status:   Future     Number of Occurrences:   1     Standing Expiration Date:   6/11/2025    CBC with Auto Differential     Standing Status:   Future     Number of Occurrences:   1     Standing Expiration Date:   6/11/2025    Ferritin     Standing Status:   Future     Number of Occurrences:   1     Standing Expiration Date:   6/11/2025                Migue Degroot MD

## 2024-09-12 ENCOUNTER — OFFICE VISIT (OUTPATIENT)
Facility: CLINIC | Age: 64
End: 2024-09-12
Payer: MEDICARE

## 2024-09-12 VITALS
RESPIRATION RATE: 16 BRPM | OXYGEN SATURATION: 99 % | HEIGHT: 65 IN | HEART RATE: 56 BPM | DIASTOLIC BLOOD PRESSURE: 65 MMHG | WEIGHT: 133.8 LBS | TEMPERATURE: 98.6 F | SYSTOLIC BLOOD PRESSURE: 123 MMHG | BODY MASS INDEX: 22.29 KG/M2

## 2024-09-12 DIAGNOSIS — K11.7 SALIVATION EXCESSIVE: Primary | ICD-10-CM

## 2024-09-12 DIAGNOSIS — I69.359 CVA, OLD, HEMIPARESIS (HCC): ICD-10-CM

## 2024-09-12 DIAGNOSIS — F33.0 MAJOR DEPRESSIVE DISORDER, RECURRENT, MILD (HCC): ICD-10-CM

## 2024-09-12 DIAGNOSIS — E78.5 DYSLIPIDEMIA: ICD-10-CM

## 2024-09-12 DIAGNOSIS — I10 ESSENTIAL HYPERTENSION: ICD-10-CM

## 2024-09-12 DIAGNOSIS — N18.6 ESRD (END STAGE RENAL DISEASE) (HCC): ICD-10-CM

## 2024-09-12 PROCEDURE — 3017F COLORECTAL CA SCREEN DOC REV: CPT | Performed by: INTERNAL MEDICINE

## 2024-09-12 PROCEDURE — 1036F TOBACCO NON-USER: CPT | Performed by: INTERNAL MEDICINE

## 2024-09-12 PROCEDURE — 99214 OFFICE O/P EST MOD 30 MIN: CPT | Performed by: INTERNAL MEDICINE

## 2024-09-12 PROCEDURE — 3074F SYST BP LT 130 MM HG: CPT | Performed by: INTERNAL MEDICINE

## 2024-09-12 PROCEDURE — 3078F DIAST BP <80 MM HG: CPT | Performed by: INTERNAL MEDICINE

## 2024-09-12 PROCEDURE — 1123F ACP DISCUSS/DSCN MKR DOCD: CPT | Performed by: INTERNAL MEDICINE

## 2024-09-12 PROCEDURE — G8420 CALC BMI NORM PARAMETERS: HCPCS | Performed by: INTERNAL MEDICINE

## 2024-09-12 PROCEDURE — G8427 DOCREV CUR MEDS BY ELIG CLIN: HCPCS | Performed by: INTERNAL MEDICINE

## 2024-09-12 RX ORDER — SCOLOPAMINE TRANSDERMAL SYSTEM 1 MG/1
PATCH, EXTENDED RELEASE TRANSDERMAL
Qty: 10 PATCH | Refills: 4 | Status: SHIPPED | OUTPATIENT
Start: 2024-09-12

## 2024-09-12 SDOH — ECONOMIC STABILITY: INCOME INSECURITY: HOW HARD IS IT FOR YOU TO PAY FOR THE VERY BASICS LIKE FOOD, HOUSING, MEDICAL CARE, AND HEATING?: NOT HARD AT ALL

## 2024-09-12 SDOH — ECONOMIC STABILITY: FOOD INSECURITY: WITHIN THE PAST 12 MONTHS, THE FOOD YOU BOUGHT JUST DIDN'T LAST AND YOU DIDN'T HAVE MONEY TO GET MORE.: NEVER TRUE

## 2024-09-12 SDOH — ECONOMIC STABILITY: FOOD INSECURITY: WITHIN THE PAST 12 MONTHS, YOU WORRIED THAT YOUR FOOD WOULD RUN OUT BEFORE YOU GOT MONEY TO BUY MORE.: NEVER TRUE

## 2024-09-12 ASSESSMENT — PATIENT HEALTH QUESTIONNAIRE - PHQ9
SUM OF ALL RESPONSES TO PHQ QUESTIONS 1-9: 0
6. FEELING BAD ABOUT YOURSELF - OR THAT YOU ARE A FAILURE OR HAVE LET YOURSELF OR YOUR FAMILY DOWN: NOT AT ALL
1. LITTLE INTEREST OR PLEASURE IN DOING THINGS: NOT AT ALL
5. POOR APPETITE OR OVEREATING: NOT AT ALL
10. IF YOU CHECKED OFF ANY PROBLEMS, HOW DIFFICULT HAVE THESE PROBLEMS MADE IT FOR YOU TO DO YOUR WORK, TAKE CARE OF THINGS AT HOME, OR GET ALONG WITH OTHER PEOPLE: NOT DIFFICULT AT ALL
4. FEELING TIRED OR HAVING LITTLE ENERGY: NOT AT ALL
8. MOVING OR SPEAKING SO SLOWLY THAT OTHER PEOPLE COULD HAVE NOTICED. OR THE OPPOSITE, BEING SO FIGETY OR RESTLESS THAT YOU HAVE BEEN MOVING AROUND A LOT MORE THAN USUAL: NOT AT ALL
SUM OF ALL RESPONSES TO PHQ QUESTIONS 1-9: 0
7. TROUBLE CONCENTRATING ON THINGS, SUCH AS READING THE NEWSPAPER OR WATCHING TELEVISION: NOT AT ALL
9. THOUGHTS THAT YOU WOULD BE BETTER OFF DEAD, OR OF HURTING YOURSELF: NOT AT ALL
3. TROUBLE FALLING OR STAYING ASLEEP: NOT AT ALL
SUM OF ALL RESPONSES TO PHQ QUESTIONS 1-9: 0
SUM OF ALL RESPONSES TO PHQ9 QUESTIONS 1 & 2: 0
SUM OF ALL RESPONSES TO PHQ QUESTIONS 1-9: 0
2. FEELING DOWN, DEPRESSED OR HOPELESS: NOT AT ALL

## 2024-09-12 ASSESSMENT — ANXIETY QUESTIONNAIRES
2. NOT BEING ABLE TO STOP OR CONTROL WORRYING: NOT AT ALL
1. FEELING NERVOUS, ANXIOUS, OR ON EDGE: NOT AT ALL
GAD7 TOTAL SCORE: 0
5. BEING SO RESTLESS THAT IT IS HARD TO SIT STILL: NOT AT ALL
6. BECOMING EASILY ANNOYED OR IRRITABLE: NOT AT ALL
7. FEELING AFRAID AS IF SOMETHING AWFUL MIGHT HAPPEN: NOT AT ALL
3. WORRYING TOO MUCH ABOUT DIFFERENT THINGS: NOT AT ALL
IF YOU CHECKED OFF ANY PROBLEMS ON THIS QUESTIONNAIRE, HOW DIFFICULT HAVE THESE PROBLEMS MADE IT FOR YOU TO DO YOUR WORK, TAKE CARE OF THINGS AT HOME, OR GET ALONG WITH OTHER PEOPLE: NOT DIFFICULT AT ALL
4. TROUBLE RELAXING: NOT AT ALL

## 2024-11-22 DIAGNOSIS — K11.7 SALIVATION EXCESSIVE: ICD-10-CM

## 2024-11-23 RX ORDER — SCOLOPAMINE TRANSDERMAL SYSTEM 1 MG/1
PATCH, EXTENDED RELEASE TRANSDERMAL
Qty: 10 PATCH | Refills: 4 | Status: SHIPPED | OUTPATIENT
Start: 2024-11-23

## 2025-01-12 RX ORDER — METOPROLOL TARTRATE 25 MG/1
TABLET, FILM COATED ORAL
Qty: 90 TABLET | Refills: 3 | Status: SHIPPED | OUTPATIENT
Start: 2025-01-12

## 2025-01-16 ENCOUNTER — OFFICE VISIT (OUTPATIENT)
Facility: CLINIC | Age: 65
End: 2025-01-16

## 2025-01-16 VITALS
TEMPERATURE: 98.7 F | OXYGEN SATURATION: 99 % | DIASTOLIC BLOOD PRESSURE: 79 MMHG | HEART RATE: 58 BPM | HEIGHT: 65 IN | RESPIRATION RATE: 16 BRPM | SYSTOLIC BLOOD PRESSURE: 163 MMHG | BODY MASS INDEX: 22.27 KG/M2

## 2025-01-16 DIAGNOSIS — N18.6 ESRD (END STAGE RENAL DISEASE) (HCC): ICD-10-CM

## 2025-01-16 DIAGNOSIS — I69.359 CVA, OLD, HEMIPARESIS (HCC): ICD-10-CM

## 2025-01-16 DIAGNOSIS — G93.1 ANOXIC BRAIN DAMAGE (HCC): Primary | ICD-10-CM

## 2025-01-16 DIAGNOSIS — R56.9 SEIZURE-LIKE ACTIVITY (HCC): ICD-10-CM

## 2025-01-16 DIAGNOSIS — Z87.891 PERSONAL HISTORY OF TOBACCO USE: ICD-10-CM

## 2025-01-16 DIAGNOSIS — Z00.00 MEDICARE ANNUAL WELLNESS VISIT, SUBSEQUENT: ICD-10-CM

## 2025-01-16 RX ORDER — ASPIRIN 81 MG/1
81 TABLET, CHEWABLE ORAL DAILY
Qty: 30 TABLET | Refills: 11 | Status: SHIPPED | OUTPATIENT
Start: 2025-01-16

## 2025-01-16 SDOH — ECONOMIC STABILITY: FOOD INSECURITY: WITHIN THE PAST 12 MONTHS, YOU WORRIED THAT YOUR FOOD WOULD RUN OUT BEFORE YOU GOT MONEY TO BUY MORE.: NEVER TRUE

## 2025-01-16 SDOH — ECONOMIC STABILITY: FOOD INSECURITY: WITHIN THE PAST 12 MONTHS, THE FOOD YOU BOUGHT JUST DIDN'T LAST AND YOU DIDN'T HAVE MONEY TO GET MORE.: NEVER TRUE

## 2025-01-16 ASSESSMENT — PATIENT HEALTH QUESTIONNAIRE - PHQ9
1. LITTLE INTEREST OR PLEASURE IN DOING THINGS: NOT AT ALL
7. TROUBLE CONCENTRATING ON THINGS, SUCH AS READING THE NEWSPAPER OR WATCHING TELEVISION: NOT AT ALL
SUM OF ALL RESPONSES TO PHQ QUESTIONS 1-9: 0
8. MOVING OR SPEAKING SO SLOWLY THAT OTHER PEOPLE COULD HAVE NOTICED. OR THE OPPOSITE, BEING SO FIGETY OR RESTLESS THAT YOU HAVE BEEN MOVING AROUND A LOT MORE THAN USUAL: NOT AT ALL
SUM OF ALL RESPONSES TO PHQ9 QUESTIONS 1 & 2: 0
SUM OF ALL RESPONSES TO PHQ QUESTIONS 1-9: 0
SUM OF ALL RESPONSES TO PHQ QUESTIONS 1-9: 0
10. IF YOU CHECKED OFF ANY PROBLEMS, HOW DIFFICULT HAVE THESE PROBLEMS MADE IT FOR YOU TO DO YOUR WORK, TAKE CARE OF THINGS AT HOME, OR GET ALONG WITH OTHER PEOPLE: NOT DIFFICULT AT ALL
4. FEELING TIRED OR HAVING LITTLE ENERGY: NOT AT ALL
5. POOR APPETITE OR OVEREATING: NOT AT ALL
3. TROUBLE FALLING OR STAYING ASLEEP: NOT AT ALL
SUM OF ALL RESPONSES TO PHQ QUESTIONS 1-9: 0
2. FEELING DOWN, DEPRESSED OR HOPELESS: NOT AT ALL
9. THOUGHTS THAT YOU WOULD BE BETTER OFF DEAD, OR OF HURTING YOURSELF: NOT AT ALL
6. FEELING BAD ABOUT YOURSELF - OR THAT YOU ARE A FAILURE OR HAVE LET YOURSELF OR YOUR FAMILY DOWN: NOT AT ALL

## 2025-01-16 ASSESSMENT — ANXIETY QUESTIONNAIRES
IF YOU CHECKED OFF ANY PROBLEMS ON THIS QUESTIONNAIRE, HOW DIFFICULT HAVE THESE PROBLEMS MADE IT FOR YOU TO DO YOUR WORK, TAKE CARE OF THINGS AT HOME, OR GET ALONG WITH OTHER PEOPLE: NOT DIFFICULT AT ALL
7. FEELING AFRAID AS IF SOMETHING AWFUL MIGHT HAPPEN: NOT AT ALL
2. NOT BEING ABLE TO STOP OR CONTROL WORRYING: NOT AT ALL
3. WORRYING TOO MUCH ABOUT DIFFERENT THINGS: NOT AT ALL
6. BECOMING EASILY ANNOYED OR IRRITABLE: NOT AT ALL
1. FEELING NERVOUS, ANXIOUS, OR ON EDGE: NOT AT ALL
4. TROUBLE RELAXING: NOT AT ALL
GAD7 TOTAL SCORE: 0
5. BEING SO RESTLESS THAT IT IS HARD TO SIT STILL: NOT AT ALL

## 2025-01-16 NOTE — PROGRESS NOTES
Chief Complaint   Patient presents with    Medicare AWV     Patient states \" he has been having dry mouth.\"    \"Have you been to the ER, urgent care clinic since your last visit?  Hospitalized since your last visit?\"    NO    “Have you seen or consulted any other health care providers outside our system since your last visit?”    NO

## 2025-01-17 NOTE — PROGRESS NOTES
[facility]  [address]   [address]  [facility] [address]  Phone:  [phone]  Fax: [phone]       Chief Complaint   Patient presents with    Medicare AWV   .      SUBJECTIVE:      History of Present Illness  The patient presents for evaluation of excessive salivation, hypertension, hyperlipidemia, and elevated potassium levels.    History is reported by other person in the presence of the patient.  He has been experiencing excessive salivation, which has been effectively managed with scopolamine patches. The use of these patches has resulted in a significant reduction in salivation, improved sleep quality, and decreased throat clearing, particularly at night. However, he has recently reported a new symptom of dry mouth. He is currently on a regimen of scopolamine patches, applied every Thursday and Sunday.    He has not experienced any recent seizures. His Dilantin medication has been discontinued by Dr. Vigil. He is currently on clonidine 0.1 mg twice daily, administered in the morning and evening. His hydralazine dosage has been increased to 100 mg twice daily by his cardiologist. Despite this, his systolic blood pressure remains elevated, with a reading of 161 today. He has a history of 3 cerebrovascular accidents (CVAs). The first CVA occurred in 07/2007, affecting his right side. The second event was a transient ischemic attack (TIA) in 08/2012, which impacted the same area. The most recent CVA occurred in 08/2022, affecting his left side.    He is also on rosuvastatin 10 mg. His last cholesterol check showed a level of 158.    He is taking calcium acetate tablets but has expressed difficulty swallowing them due to their thin shape, often resulting in him spitting them out. His urologist, Dr. Yevgeniy Townsend, has recommended switching to a capsule form. He has been approved for 24-hour long-term care at home. Last week, his potassium levels spiked to 8, but a repeat test

## 2025-01-17 NOTE — PATIENT INSTRUCTIONS
smoking is the best step you can take to prevent lung cancer. If you want to quit, your doctor can recommend medicines or other ways to help.  Follow-up care is a key part of your treatment and safety. Be sure to make and go to all appointments, and call your doctor if you are having problems. It's also a good idea to know your test results and keep a list of the medicines you take.  Where can you learn more?  Go to https://www.[employer].net/patientEd and enter Q940 to learn more about \"Learning About Lung Cancer Screening.\"  Current as of: October 25, 2023  Content Version: 14.3  © 2024 [employer].   Care instructions adapted under license by [employer] If you have questions about a medical condition or this instruction, always ask your healthcare professional. [email or web address] disclaims any warranty or liability for your use of this information.         A Healthy Heart: Care Instructions  Overview     Coronary artery disease, also called heart disease, occurs when a substance called plaque builds up in the vessels that supply oxygen-rich blood to your heart muscle. This can narrow the blood vessels and reduce blood flow. A heart attack happens when blood flow is completely blocked. A high-fat diet, smoking, and other factors increase the risk of heart disease.  Your doctor has found that you have a chance of having heart disease. A heart-healthy lifestyle can help keep your heart healthy and prevent heart disease. This lifestyle includes eating healthy, being active, staying at a weight that's healthy for you, and not smoking or using tobacco. It also includes taking medicines as directed, managing other health conditions, and trying to get a healthy amount of sleep.  Follow-up care is a key part of your treatment and safety. Be sure to make and go to all appointments, and call your doctor if you are having problems. It's also a good idea to know your test results and keep a list of the

## 2025-02-06 DIAGNOSIS — K11.7 SALIVATION EXCESSIVE: ICD-10-CM

## 2025-02-07 RX ORDER — SCOPOLAMINE 1 MG/3D
PATCH, EXTENDED RELEASE TRANSDERMAL
Qty: 30 PATCH | Refills: 3 | Status: SHIPPED | OUTPATIENT
Start: 2025-02-07

## 2025-03-28 RX ORDER — ROSUVASTATIN CALCIUM 10 MG/1
10 TABLET, COATED ORAL NIGHTLY
Qty: 90 TABLET | Refills: 3 | Status: SHIPPED | OUTPATIENT
Start: 2025-03-28

## 2025-03-28 RX ORDER — OMEPRAZOLE 20 MG/1
20 CAPSULE, DELAYED RELEASE ORAL DAILY
Qty: 90 CAPSULE | Refills: 3 | Status: SHIPPED | OUTPATIENT
Start: 2025-03-28

## 2025-06-09 DIAGNOSIS — K11.7 SALIVATION EXCESSIVE: ICD-10-CM

## 2025-06-11 RX ORDER — SCOPOLAMINE 1 MG/3D
PATCH, EXTENDED RELEASE TRANSDERMAL
Qty: 30 PATCH | Refills: 3 | Status: SHIPPED | OUTPATIENT
Start: 2025-06-11

## 2025-07-17 ENCOUNTER — OFFICE VISIT (OUTPATIENT)
Facility: CLINIC | Age: 65
End: 2025-07-17

## 2025-07-17 VITALS
DIASTOLIC BLOOD PRESSURE: 65 MMHG | OXYGEN SATURATION: 100 % | TEMPERATURE: 98.6 F | RESPIRATION RATE: 16 BRPM | HEIGHT: 65 IN | SYSTOLIC BLOOD PRESSURE: 123 MMHG | HEART RATE: 54 BPM | BODY MASS INDEX: 21.83 KG/M2 | WEIGHT: 131 LBS

## 2025-07-17 DIAGNOSIS — I69.359 CVA, OLD, HEMIPARESIS (HCC): ICD-10-CM

## 2025-07-17 DIAGNOSIS — I10 ESSENTIAL HYPERTENSION: ICD-10-CM

## 2025-07-17 DIAGNOSIS — R53.81 PHYSICAL DECONDITIONING: ICD-10-CM

## 2025-07-17 DIAGNOSIS — F33.0 MAJOR DEPRESSIVE DISORDER, RECURRENT, MILD: ICD-10-CM

## 2025-07-17 DIAGNOSIS — R56.9 SEIZURE-LIKE ACTIVITY (HCC): ICD-10-CM

## 2025-07-17 DIAGNOSIS — N18.6 ESRD (END STAGE RENAL DISEASE) (HCC): Primary | ICD-10-CM

## 2025-07-17 SDOH — ECONOMIC STABILITY: FOOD INSECURITY: WITHIN THE PAST 12 MONTHS, THE FOOD YOU BOUGHT JUST DIDN'T LAST AND YOU DIDN'T HAVE MONEY TO GET MORE.: NEVER TRUE

## 2025-07-17 SDOH — ECONOMIC STABILITY: FOOD INSECURITY: WITHIN THE PAST 12 MONTHS, YOU WORRIED THAT YOUR FOOD WOULD RUN OUT BEFORE YOU GOT MONEY TO BUY MORE.: NEVER TRUE

## 2025-07-17 ASSESSMENT — PATIENT HEALTH QUESTIONNAIRE - PHQ9
SUM OF ALL RESPONSES TO PHQ QUESTIONS 1-9: 0
2. FEELING DOWN, DEPRESSED OR HOPELESS: NOT AT ALL
SUM OF ALL RESPONSES TO PHQ QUESTIONS 1-9: 0
9. THOUGHTS THAT YOU WOULD BE BETTER OFF DEAD, OR OF HURTING YOURSELF: NOT AT ALL
1. LITTLE INTEREST OR PLEASURE IN DOING THINGS: NOT AT ALL
SUM OF ALL RESPONSES TO PHQ QUESTIONS 1-9: 0
4. FEELING TIRED OR HAVING LITTLE ENERGY: NOT AT ALL
6. FEELING BAD ABOUT YOURSELF - OR THAT YOU ARE A FAILURE OR HAVE LET YOURSELF OR YOUR FAMILY DOWN: NOT AT ALL
SUM OF ALL RESPONSES TO PHQ QUESTIONS 1-9: 0
3. TROUBLE FALLING OR STAYING ASLEEP: NOT AT ALL
8. MOVING OR SPEAKING SO SLOWLY THAT OTHER PEOPLE COULD HAVE NOTICED. OR THE OPPOSITE, BEING SO FIGETY OR RESTLESS THAT YOU HAVE BEEN MOVING AROUND A LOT MORE THAN USUAL: NOT AT ALL
10. IF YOU CHECKED OFF ANY PROBLEMS, HOW DIFFICULT HAVE THESE PROBLEMS MADE IT FOR YOU TO DO YOUR WORK, TAKE CARE OF THINGS AT HOME, OR GET ALONG WITH OTHER PEOPLE: NOT DIFFICULT AT ALL
7. TROUBLE CONCENTRATING ON THINGS, SUCH AS READING THE NEWSPAPER OR WATCHING TELEVISION: NOT AT ALL
5. POOR APPETITE OR OVEREATING: NOT AT ALL

## 2025-08-08 PROBLEM — R53.81 PHYSICAL DECONDITIONING: Status: ACTIVE | Noted: 2025-08-08

## 2025-08-13 RX ORDER — MIRTAZAPINE 15 MG/1
TABLET, FILM COATED ORAL
Qty: 45 TABLET | Refills: 3 | Status: SHIPPED | OUTPATIENT
Start: 2025-08-13

## 2025-08-13 RX ORDER — HYDRALAZINE HYDROCHLORIDE 50 MG/1
TABLET, FILM COATED ORAL
Qty: 360 TABLET | Refills: 3 | Status: SHIPPED | OUTPATIENT
Start: 2025-08-13

## (undated) DEVICE — SUT CHRMC 3-0 27IN SH BRN --

## (undated) DEVICE — SUTURE PERMAHAND SZ 2-0 L30IN NONABSORBABLE BLK SILK W/O A305H

## (undated) DEVICE — VESSEL LOOPS,MINI, RED: Brand: DEVON

## (undated) DEVICE — STERILE POLYISOPRENE POWDER-FREE SURGICAL GLOVES: Brand: PROTEXIS

## (undated) DEVICE — KENDALL SCD EXPRESS SLEEVES, KNEE LENGTH, MEDIUM: Brand: KENDALL SCD

## (undated) DEVICE — LOOP VES MAXI YEL 2/PK

## (undated) DEVICE — DEVON™ KNEE AND BODY STRAP 60" X 3" (1.5 M X 7.6 CM): Brand: DEVON

## (undated) DEVICE — SUTURE PERMAHAND SZ 3-0 L30IN NONABSORBABLE BLK SILK BRAID A304H

## (undated) DEVICE — SLIM BODY SKIN STAPLER: Brand: APPOSE ULC

## (undated) DEVICE — SCANLAN® VASCU-STATT® II PLUS S-USE BULLDOG CLAMP W/FIRM PRESS GENTLE-JAW™- MINI ANGLED 45°(GREEN), CLAMPING PRESSURE 20-25 G (2/STERILE PKG): Brand: SCANLAN® VASCU-STATT® II PLUS S-USE BULLDOG CLAMP W/FIRM PRESS GENTLE-JAW™

## (undated) DEVICE — SOLUTION IV 1000ML 0.9% SOD CHL

## (undated) DEVICE — Device

## (undated) DEVICE — SUT ETHLN 4-0 18IN PS2 BLK --

## (undated) DEVICE — CATH URETH INTMIT ROB 12FR FUN -- CONVERT TO ITEM 151713

## (undated) DEVICE — DRAPE PRB US TRNSDCR 6X96IN --

## (undated) DEVICE — PROBE VASC 8MHZ WTRPRF

## (undated) DEVICE — TRAY PREP DRY W/ PREM GLV 2 APPL 6 SPNG 2 UNDPD 1 OVERWRAP

## (undated) DEVICE — SUT PROL 6-0 24IN BV1 DA BLU --

## (undated) DEVICE — SUT ETHLN 3-0 18IN PS1 BLK --

## (undated) DEVICE — CLAMP,EDSLAB HANDLELESS 8MM DBLE SOFTJAW: Brand: FOGARTY SOFTJAW

## (undated) DEVICE — DISH PETRI W/LID STRL -- MIN CASE ORDER IS 2 CA

## (undated) DEVICE — TOWEL SURG W17XL27IN STD BLU COT NONFENESTRATED PREWASHED

## (undated) DEVICE — SYR 3ML LL TIP 1/10ML GRAD --

## (undated) DEVICE — 3M™ WARMING BLANKET, LOWER BODY, 10 PER CASE, 42568: Brand: BAIR HUGGER™

## (undated) DEVICE — PART NUMBER 108260, VTI 8 MHZ DISPOSABLE DOPPLER PROBE, STRAIGHT, BOX: Brand: VTI 8 MHZ DISPOSABLE DOPPLER PROBE, STRAIGHT, BOX

## (undated) DEVICE — REM POLYHESIVE ADULT PATIENT RETURN ELECTRODE: Brand: VALLEYLAB

## (undated) DEVICE — HANDLE LT SNAP ON ULT DURABLE LENS FOR TRUMPF ALC DISPOSABLE

## (undated) DEVICE — INFECTION CONTROL KIT SYS

## (undated) DEVICE — SOLUTION LACTATED RINGERS INJECTION USP

## (undated) DEVICE — DRAPE,REIN 53X77,STERILE: Brand: MEDLINE

## (undated) DEVICE — SUT PROL 6-0 18IN BV1 DA BLU --

## (undated) DEVICE — INTENDED USED TO PROTECT, TAG AND HELP LOCATED SUTURES DURING SURGERY: Brand: STERION®SUTURE AID BOOTIES

## (undated) DEVICE — SPONGE GZ W4XL4IN COT 12 PLY TYP VII WVN C FLD DSGN

## (undated) DEVICE — (D)PREP SKN CHLRAPRP APPL 26ML -- CONVERT TO ITEM 371833

## (undated) DEVICE — GAUZE SPONGES,12 PLY: Brand: CURITY

## (undated) DEVICE — KERLIX BANDAGE ROLL: Brand: KERLIX

## (undated) DEVICE — BULB SYRINGE, IRRIGATION WITH PROTECTIVE CAP, 60 CC, INDIVIDUALLY WRAPPED: Brand: DOVER

## (undated) DEVICE — X-RAY SPONGES,16 PLY: Brand: DERMACEA

## (undated) DEVICE — SUTURE PROL SZ 5-0 L24IN NONABSORBABLE BLU RB-2 L13IN 1/2 8554H

## (undated) DEVICE — HEMADUCT 7MM FLAT, FULL DUCT: Brand: CARDINAL HEALTH